# Patient Record
Sex: FEMALE | HISPANIC OR LATINO | Employment: UNEMPLOYED | URBAN - METROPOLITAN AREA
[De-identification: names, ages, dates, MRNs, and addresses within clinical notes are randomized per-mention and may not be internally consistent; named-entity substitution may affect disease eponyms.]

---

## 2017-01-01 ENCOUNTER — HOSPITAL ENCOUNTER (EMERGENCY)
Facility: MEDICAL CENTER | Age: 37
End: 2017-01-01
Attending: EMERGENCY MEDICINE

## 2017-01-01 ENCOUNTER — APPOINTMENT (OUTPATIENT)
Dept: RADIOLOGY | Facility: MEDICAL CENTER | Age: 37
End: 2017-01-01
Attending: EMERGENCY MEDICINE

## 2017-01-01 VITALS
HEIGHT: 66 IN | WEIGHT: 166.67 LBS | HEART RATE: 81 BPM | DIASTOLIC BLOOD PRESSURE: 62 MMHG | OXYGEN SATURATION: 100 % | BODY MASS INDEX: 26.79 KG/M2 | SYSTOLIC BLOOD PRESSURE: 129 MMHG | RESPIRATION RATE: 16 BRPM | TEMPERATURE: 97.2 F

## 2017-01-01 DIAGNOSIS — R30.0 DYSURIA: ICD-10-CM

## 2017-01-01 DIAGNOSIS — Z3A.12 12 WEEKS GESTATION OF PREGNANCY: ICD-10-CM

## 2017-01-01 LAB
ALBUMIN SERPL BCP-MCNC: 4.1 G/DL (ref 3.2–4.9)
ALBUMIN/GLOB SERPL: 1.3 G/DL
ALP SERPL-CCNC: 77 U/L (ref 30–99)
ALT SERPL-CCNC: 26 U/L (ref 2–50)
ANION GAP SERPL CALC-SCNC: 13 MMOL/L (ref 0–11.9)
APPEARANCE UR: CLEAR
AST SERPL-CCNC: 30 U/L (ref 12–45)
B-HCG SERPL-ACNC: ABNORMAL MIU/ML (ref 0–5)
BACTERIA #/AREA URNS HPF: ABNORMAL /HPF
BASOPHILS # BLD AUTO: 0.2 % (ref 0–1.8)
BASOPHILS # BLD: 0.03 K/UL (ref 0–0.12)
BILIRUB SERPL-MCNC: 1.1 MG/DL (ref 0.1–1.5)
BILIRUB UR QL STRIP.AUTO: NEGATIVE
BUN SERPL-MCNC: 5 MG/DL (ref 8–22)
CALCIUM SERPL-MCNC: 9.9 MG/DL (ref 8.5–10.5)
CHLORIDE SERPL-SCNC: 103 MMOL/L (ref 96–112)
CO2 SERPL-SCNC: 17 MMOL/L (ref 20–33)
COLOR UR: YELLOW
CREAT SERPL-MCNC: 0.45 MG/DL (ref 0.5–1.4)
CULTURE IF INDICATED INDCX: NO UA CULTURE
EOSINOPHIL # BLD AUTO: 0.01 K/UL (ref 0–0.51)
EOSINOPHIL NFR BLD: 0.1 % (ref 0–6.9)
EPI CELLS #/AREA URNS HPF: ABNORMAL /HPF
ERYTHROCYTE [DISTWIDTH] IN BLOOD BY AUTOMATED COUNT: 40.6 FL (ref 35.9–50)
GFR SERPL CREATININE-BSD FRML MDRD: >60 ML/MIN/1.73 M 2
GLOBULIN SER CALC-MCNC: 3.2 G/DL (ref 1.9–3.5)
GLUCOSE SERPL-MCNC: 90 MG/DL (ref 65–99)
GLUCOSE UR STRIP.AUTO-MCNC: NEGATIVE MG/DL
HCT VFR BLD AUTO: 37.8 % (ref 37–47)
HGB BLD-MCNC: 12.8 G/DL (ref 12–16)
IMM GRANULOCYTES # BLD AUTO: 0.06 K/UL (ref 0–0.11)
IMM GRANULOCYTES NFR BLD AUTO: 0.4 % (ref 0–0.9)
KETONES UR STRIP.AUTO-MCNC: 60 MG/DL
LEUKOCYTE ESTERASE UR QL STRIP.AUTO: NEGATIVE
LIPASE SERPL-CCNC: 12 U/L (ref 11–82)
LYMPHOCYTES # BLD AUTO: 2.99 K/UL (ref 1–4.8)
LYMPHOCYTES NFR BLD: 21.4 % (ref 22–41)
MCH RBC QN AUTO: 30.3 PG (ref 27–33)
MCHC RBC AUTO-ENTMCNC: 33.9 G/DL (ref 33.6–35)
MCV RBC AUTO: 89.6 FL (ref 81.4–97.8)
MICRO URNS: ABNORMAL
MONOCYTES # BLD AUTO: 0.68 K/UL (ref 0–0.85)
MONOCYTES NFR BLD AUTO: 4.9 % (ref 0–13.4)
MUCOUS THREADS #/AREA URNS HPF: ABNORMAL /HPF
NEUTROPHILS # BLD AUTO: 10.2 K/UL (ref 2–7.15)
NEUTROPHILS NFR BLD: 73 % (ref 44–72)
NITRITE UR QL STRIP.AUTO: NEGATIVE
NRBC # BLD AUTO: 0 K/UL
NRBC BLD AUTO-RTO: 0 /100 WBC
PH UR STRIP.AUTO: 6 [PH]
PLATELET # BLD AUTO: 247 K/UL (ref 164–446)
PMV BLD AUTO: 10.3 FL (ref 9–12.9)
POTASSIUM SERPL-SCNC: 3 MMOL/L (ref 3.6–5.5)
PROT SERPL-MCNC: 7.3 G/DL (ref 6–8.2)
PROT UR QL STRIP: NEGATIVE MG/DL
RBC # BLD AUTO: 4.22 M/UL (ref 4.2–5.4)
RBC # URNS HPF: ABNORMAL /HPF
RBC UR QL AUTO: ABNORMAL
SODIUM SERPL-SCNC: 133 MMOL/L (ref 135–145)
SP GR UR STRIP.AUTO: 1.02
WBC # BLD AUTO: 14 K/UL (ref 4.8–10.8)
WBC #/AREA URNS HPF: ABNORMAL /HPF

## 2017-01-01 PROCEDURE — A9270 NON-COVERED ITEM OR SERVICE: HCPCS | Performed by: EMERGENCY MEDICINE

## 2017-01-01 PROCEDURE — 85025 COMPLETE CBC W/AUTO DIFF WBC: CPT

## 2017-01-01 PROCEDURE — 80053 COMPREHEN METABOLIC PANEL: CPT

## 2017-01-01 PROCEDURE — 96361 HYDRATE IV INFUSION ADD-ON: CPT

## 2017-01-01 PROCEDURE — 83690 ASSAY OF LIPASE: CPT

## 2017-01-01 PROCEDURE — 700105 HCHG RX REV CODE 258: Performed by: EMERGENCY MEDICINE

## 2017-01-01 PROCEDURE — 84702 CHORIONIC GONADOTROPIN TEST: CPT

## 2017-01-01 PROCEDURE — 76817 TRANSVAGINAL US OBSTETRIC: CPT

## 2017-01-01 PROCEDURE — 81001 URINALYSIS AUTO W/SCOPE: CPT

## 2017-01-01 PROCEDURE — 36415 COLL VENOUS BLD VENIPUNCTURE: CPT

## 2017-01-01 PROCEDURE — 96375 TX/PRO/DX INJ NEW DRUG ADDON: CPT

## 2017-01-01 PROCEDURE — 700111 HCHG RX REV CODE 636 W/ 250 OVERRIDE (IP): Performed by: EMERGENCY MEDICINE

## 2017-01-01 PROCEDURE — 99285 EMERGENCY DEPT VISIT HI MDM: CPT

## 2017-01-01 PROCEDURE — 96365 THER/PROPH/DIAG IV INF INIT: CPT

## 2017-01-01 PROCEDURE — 700102 HCHG RX REV CODE 250 W/ 637 OVERRIDE(OP): Performed by: EMERGENCY MEDICINE

## 2017-01-01 RX ORDER — SODIUM CHLORIDE 9 MG/ML
1000 INJECTION, SOLUTION INTRAVENOUS ONCE
Status: COMPLETED | OUTPATIENT
Start: 2017-01-01 | End: 2017-01-01

## 2017-01-01 RX ORDER — ACETAMINOPHEN 325 MG/1
650 TABLET ORAL ONCE
Status: COMPLETED | OUTPATIENT
Start: 2017-01-01 | End: 2017-01-01

## 2017-01-01 RX ORDER — SODIUM CHLORIDE 9 MG/ML
1000 INJECTION, SOLUTION INTRAVENOUS CONTINUOUS
Status: DISCONTINUED | OUTPATIENT
Start: 2017-01-01 | End: 2017-01-01 | Stop reason: HOSPADM

## 2017-01-01 RX ORDER — CEFTRIAXONE 1 G/1
1 INJECTION, POWDER, FOR SOLUTION INTRAMUSCULAR; INTRAVENOUS ONCE
Status: COMPLETED | OUTPATIENT
Start: 2017-01-01 | End: 2017-01-01

## 2017-01-01 RX ORDER — CEPHALEXIN 500 MG/1
500 CAPSULE ORAL 3 TIMES DAILY
Qty: 15 CAP | Refills: 0 | Status: SHIPPED | OUTPATIENT
Start: 2017-01-01 | End: 2017-01-06

## 2017-01-01 RX ORDER — ONDANSETRON 2 MG/ML
4 INJECTION INTRAMUSCULAR; INTRAVENOUS ONCE
Status: COMPLETED | OUTPATIENT
Start: 2017-01-01 | End: 2017-01-01

## 2017-01-01 RX ADMIN — SODIUM CHLORIDE 1000 ML: 9 INJECTION, SOLUTION INTRAVENOUS at 20:30

## 2017-01-01 RX ADMIN — SODIUM CHLORIDE 1000 ML: 9 INJECTION, SOLUTION INTRAVENOUS at 18:11

## 2017-01-01 RX ADMIN — ONDANSETRON 4 MG: 2 INJECTION, SOLUTION INTRAMUSCULAR; INTRAVENOUS at 18:12

## 2017-01-01 RX ADMIN — CEFTRIAXONE SODIUM 1 G: 1 INJECTION, POWDER, FOR SOLUTION INTRAMUSCULAR; INTRAVENOUS at 20:30

## 2017-01-01 RX ADMIN — ACETAMINOPHEN 650 MG: 325 TABLET, FILM COATED ORAL at 18:12

## 2017-01-01 ASSESSMENT — PAIN SCALES - GENERAL
PAINLEVEL_OUTOF10: 3
PAINLEVEL_OUTOF10: 8

## 2017-01-01 NOTE — ED AVS SNAPSHOT
[employer] Access Code: [email or web address]  Expires: 1/31/2017  9:00 PM    [employer]  A secure, online tool to manage your health information     [employer]® is a secure, online tool that connects you to your personalized health information from the privacy of your home -- day or night - making it very easy for you to manage your healthcare. Once the activation process is completed, you can even access your medical information using the [employer] ren, which is available for free in the Apple Ren store or Google Play store.     [employer] provides the following levels of access (as shown below):   My Chart Features   [facility] Primary Care Doctor [facility]  Specialists [facility]  Urgent  Care Non-[facility]  Primary Care  Doctor   Email your healthcare team securely and privately 24/7 X X X X   Manage appointments: schedule your next appointment; view details of past/upcoming appointments X      Request prescription refills. X      View recent personal medical records, including lab and immunizations X X X X   View health record, including health history, allergies, medications X X X X   Read reports about your outpatient visits, procedures, consult and ER notes X X X X   See your discharge summary, which is a recap of your hospital and/or ER visit that includes your diagnosis, lab results, and care plan. X X       How to register for [employer]:  1. Go to  [email or web address].  2. Click on the Sign Up Now box, which takes you to the New Member Sign Up page. You will need to provide the following information:  a. Enter your [employer] Access Code exactly as it appears at the top of this page. (You will not need to use this code after you’ve completed the sign-up process. If you do not sign up before the expiration date, you must request a new code.)   b. Enter your date of birth.   c. Enter your home email address.   d. Click Submit, and follow the next screen’s instructions.  3. Create a [employer] ID. This will be your [employer]  login ID and cannot be changed, so think of one that is secure and easy to remember.  4. Create a [employer] password. You can change your password at any time.  5. Enter your Password Reset Question and Answer. This can be used at a later time if you forget your password.   6. Enter your e-mail address. This allows you to receive e-mail notifications when new information is available in [employer].  7. Click Sign Up. You can now view your health information.    For assistance activating your [employer] account, call [phone]

## 2017-01-01 NOTE — ED AVS SNAPSHOT
1/1/2017          Jaylene Deleon  [address]  [place] [address]    Dear Jaylene:    [facility] wants to ensure your discharge home is safe and you or your loved ones have had all your questions answered regarding your care after you leave the hospital.    You may receive a telephone call within two days of your discharge.  This call is to make certain you understand your discharge instructions as well as ensure we provided you with the best care possible during your stay with us.     The call will only last approximately 3-5 minutes and will be done by a nurse.    Once again, we want to ensure your discharge home is safe and that you have a clear understanding of any next steps in your care.  If you have any questions or concerns, please do not hesitate to contact us, we are here for you.  Thank you for choosing [facility] for your healthcare needs.    Sincerely,    David Monsivais    [facility]

## 2017-01-01 NOTE — ED AVS SNAPSHOT
After Visit Summary                                                                                                                Jaylene Deleon   MRN: [id number]    Department:  [facility], Emergency Dept   Date of Visit:  1/1/2017            [facility], Emergency Dept    [address] [place] [id number]    Phone:  [phone]      You were seen by     Cj Harley M.D.      Your Diagnosis Was     Dysuria     R30.0       These are the medications you received during your hospitalization from 01/01/2017 1533 to 01/01/2017 2100     Date/Time Order Dose Route Action    01/01/2017 1811 NS infusion 1,000 mL 1,000 mL Intravenous New Bag    01/01/2017 1812 ondansetron (ZOFRAN) syringe/vial injection 4 mg 4 mg Intravenous Given    01/01/2017 1812 acetaminophen (TYLENOL) tablet 650 mg 650 mg Oral Given    01/01/2017 2030 NS infusion 1,000 mL 1,000 mL Intravenous New Bag    01/01/2017 2030 cefTRIAXone (ROCEPHIN) injection 1 g 1 g Intravenous Given      Follow-up Information     1. Follow up with [facility], Emergency Dept.    Specialty:  Emergency Medicine    Why:  If symptoms worsen    Contact information    [address] [place] [id number] [phone]      Medication Information     Review all of your home medications and newly ordered medications with your primary doctor and/or pharmacist as soon as possible. Follow medication instructions as directed by your doctor and/or pharmacist.     Please keep your complete medication list with you and share with your physician. Update the information when medications are discontinued, doses are changed, or new medications (including over-the-counter products) are added; and carry medication information at all times in the event of emergency situations.               Medication List      START taking these medications        Instructions    cephALEXin 500 MG Caps   Commonly known as:   KEFLEX    Take 1 Cap by mouth 3 times a day for 5 days.   Dose:  500 mg               Procedures and tests performed during your visit     CBC WITH DIFFERENTIAL    COMP METABOLIC PANEL    ESTIMATED GFR    HCG QUANTITATIVE SERUM    IV Saline Lock    LIPASE    URINALYSIS CULTURE, IF INDICATED    URINE MICROSCOPIC (W/UA)    US-OB PELVIS TRANSVAGINAL        Discharge Instructions       Disuria  (Dysuria)  La disuria es dolor o molestia al orinar. El dolor o la molestia se pueden sentir en el conducto que transporta la orina fuera de la vejiga (uretra) o en el tejido que rodea los genitales. El dolor también se puede sentir en la mika de la jasen y en la parte inferior del abdomen y de la espalda. Quizás tenga que orinar con frecuencia o la sensación repentina de tener que orinar (tenesmo vesical). La disuria puede afectar tanto a hombres kaleigh a mujeres, narendra es más común en las mujeres.  La causa puede deberse a muchos problemas diferentes:  · Infección en las vías urinarias en mujeres.  · Infección en los riñones o la vejiga.  · Cálculos en los riñones o la vejiga.  · Ciertas enfermedades de transmisión sexual (ETS), kaleigh la clamidia.  · Deshidratación.  · Inflamación de la vagina.  · Uso de ciertos medicamentos.  · Uso de ciertos jabones o productos perfumados que provocan irritación.  INSTRUCCIONES PARA EL CUIDADO EN EL HOGAR  Controle roberts disuria para staci si hay cambios. Las siguientes indicaciones pueden ayudar a aliviar cualquier molestia que pueda sentir:  · Destinee suficiente líquido para mantener la orina danny o de color amarillo pálido.  · Vacíe la vejiga con frecuencia. Evite retener la orina sahnnan largos períodos.  · Después de defecar, las mujeres deben limpiarse desde adelante hacia atrás, usando el papel higiénico solo maryam vez.  · Vacíe la vejiga después de tener relaciones sexuales.  · [place] los medicamentos solamente kaleigh se lo haya indicado el médico.  · Si le recetaron antibióticos, asegúrese de  terminarlos, incluso si comienza a sentirse mejor.  · Evite la cafeína, el té y el alcohol. Estos productos pueden irritar la vejiga y empeorar la disuria. En los hombres, el alcohol puede irritar la próstata.  · Concurra a todas las visitas de control kaleigh se lo haya indicado el médico. [place] es importante.  · Si le realizaron pruebas para detectar la causa de la disuria, es roberts responsabilidad retirar los resultados. Consulte en el laboratorio o en el departamento en el que fue realizado el estudio cuándo y cómo podrá obtener los resultados. Hable con el médico si tiene alguna pregunta sobre los resultados.  SOLICITE ATENCIÓN MÉDICA SI:  · Siente dolor en la espalda o a los costados del cuerpo.  · Tiene fiebre.  · Tiene náuseas o vómitos.  · Observa pia en la orina.  · Está orinando con más frecuencia que lo habitual.  SOLICITE ATENCIÓN MÉDICA DE INMEDIATO SI:  · El dolor es intenso y no se cheryl con los medicamentos.  · No puede retener líquido.  · Usted u otra persona advierten algún cambio en roberts función mental.  · Tiene maryam frecuencia cardíaca acelerada en reposo.  · Tiene temblores o escalofríos.  · Se siente muy débil.     Esta información no tiene kaleigh fin reemplazar el consejo del médico. Asegúrese de hacerle al médico cualquier pregunta que tenga.     Document Released: 01/06/2009 Document Revised: 01/08/2016  [employer] Interactive Patient Education ©2016 [employer]      Embarazo  (Pregnancy)  Si planea quedar embarazada, es maryam buena idea concertar maryam yamini de preconcepción con el médico para poder lograr un estilo de alex saludable ante de quedar embarazada. [place] incluye dieta, peso, ejercicio, el coleen vitaminas prenatales en especial ácido fólico (ayuda a prevenir defectos en el cerebro y la médula edouard), evitar el alcohol, fumar, las drogas ilegales, problemas médicos (diabetes, convulsiones), historial familiar de problemas genéticos, condiciones de trabajo e inmunizaciones. Es mejor tener  conocimiento de estas cosas y hacer algo antes de quedar embarazada.  Si está embarazada, es necesario que siga ciertas pautas para tener un bebé jessica. Es muy importante realizar controles prenatales adecuados y seguir las indicaciones del profesional que la asiste. La atención prenatal incluye toda la asistencia médica que usted recibe antes del nacimiento del bebé. [place] ayuda a prevenir problemas shannan el embarazo y el parto.  INSTRUCCIONES PARA EL CUIDADO DOMICILIARIO  · Comience las consultas prenatales alrededor de la 12ª semana de embarazo o lo antes posible. Al principio generalmente se programan cada mes. Se hacen más frecuentes en los 2 últimos meses antes del parto. Es importante que concurra a todas las citas con el profesional y siga nilam instrucciones con respecto a los medicamentos que deba utilizar, a la actividad física y a la dieta.  · Shannan el embarazo debe obtener nutrientes para usted y para roberts bebé. Consuma maryam dieta normal y harshad balanceada. Elija alimentos kaleigh carne, pescado, leche y otros productos lácteos, vegetales, frutas, panes integrales y cereales El profesional le informará cuál es el aumento de peso ideal, según roberts peso y altura actuales. Destinee gran cantidad de líquidos. Trate de beber 8 vasos de líquidos por día.  · El alcohol se asocia a cierto número de defectos del nacimiento, incluyendo el síndrome de alcoholismo fetal. Lo mejor es evitarlo completamente El cigarrillo causa nacimientos prematuros y bebés de bajo peso al nacer. El consumo de alcohol y nicotina shannan el embarazo también aumentan marcadamente la probabilidad de que el brenton sea químicamente dependiente en etapas posteriores de roberts alex y puede contribuir al síndrome de muerte súbita infantil (SMSI)  · No consuma drogas.  · Solo tome medicamentos prescriptos o de venta aris que le haya recomendado el profesional. Algunos medicamentos pueden causar problemas genéticos y físicos al bebé  · Las náuseas matinales  pueden aliviarse si come algunas galletitas saladas en la cama. Coma dos galletitas antes de levantarse por la mañana.  · Las relaciones sexuales pueden continuarse hasta demetrius el final del embarazo, si no se presentan otros problemas kaleigh pérdida prematura (antes de tiempo) de líquido amniótico, hemorragia vaginal, dolor shannan las relaciones sexuales o dolor abdominal (en el vientre).  · Practique ejercicios con regularidad. Consulte con el profesional que la asiste si no sabe con certeza si determinados ejercicios son seguros.  · No utilice la bañera con [place], true turcos y saunas. Estos aumentan el riesgo de sufrir un desmayo o de pérdida del conocimiento, y así lastimarse usted o el bebé. La natación es un buen ejercicio. Descanse todo lo que pueda e incluya maryam siesta después de almorzar siempre que le sea posible, especialmente shannan el tercer trimestre.  · Evite los olores y las sustancias químicas tóxicas.  · No use zapatos de tacones altos, podría perder el equilibrio y caer.  · No levante objetos de más de 2,5 kg. Si levanta un objeto, flexione las piernas y los muslos, y no la espalda.  · Evite los viajes largos, especialmente en el tercer trimestre.  · Si debe viajar fuera de la ciudad o de roberts estado, lleve maryam copia de la historia clínica.  SOLICITE ATENCIÓN MÉDICA DE INMEDIATO SI:  · La temperatura oral se eleva sin motivo por encima de 38,9° C (102° F) o según le indique el profesional que lo asiste.  · Tiene maryam pérdida de líquido por la vagina. Si sospecha maryam ruptura de las membranas, tómese la temperatura y llame al profesional para informarlo sobre esto.  · Observa unas pequeñas manchas o maryam hemorragia vaginal Notifique al profesional acerca de la cantidad y de cuántos apósitos está utilizando.  · Continúa teniendo náuseas y no obtiene alivio de los medicamentos que le banegas indicado, o vomita pia o maryam sustancia similar a la borra del café.  · Presenta un dolor en la mika superior  del abdomen.  · Siente molestias en el ligamento cecilio en la parte abdominal baja. El profesional que la asiste la evaluará.  · Siente pequeñas contracciones del útero (matriz)  · No siente que el bebé se mueve, o percibe menos movimientos que antes.  · Siente dolor al orinar.  · Observa maryam hemorragia vaginal anormal.  · Tiene diarrea persistente.  · Sufre maryam cefalea grave.  · Tiene problemas visuales.  · Comienza a sentir debilidad muscular.  · Se siente mareada o sufre un desmayo.  · Comienza a sentir falta de aire.  · Siente dolor en el pecho.  · Sufre dolor en la espalda que se irradia hacia la pierna y el pie.  · Siente latidos cardíacos irregulares o la frecuencia cardíaca es muy rápida.  · Aumenta excesivamente de peso en un período breve (2,5 kg en 3 a 5 días)  · Se ve envuelta en maryam situación de violencia doméstica.  Document Released: 09/27/2006 Document Revised: 03/11/2013  [employer]Care® Patient Information ©2014 [employer].            Patient Information     Patient Information    Following emergency treatment: all patient requiring follow-up care must return either to a private physician or a clinic if your condition worsens before you are able to obtain further medical attention, please return to the emergency room.     Billing Information    At [facility], we work to make the billing process streamlined for our patients.  Our Representatives are here to answer any questions you may have regarding your hospital bill.  If you have insurance coverage and have supplied your insurance information to us, we will submit a claim to your insurer on your behalf.  Should you have any questions regarding your bill, we can be reached online or by phone as follows:  Online: You are able pay your bills online or live chat with our representatives about any billing questions you may have. We are here to help Monday - Friday from 8:00am to 7:30pm and 9:00am - 12:00pm on Saturdays.  Please visit  [email or web address]  for more information.   Phone:  [phone] or [phone]    Please note that your emergency physician, surgeon, pathologist, radiologist, anesthesiologist, and other specialists are not employed by [facility] and will therefore bill separately for their services.  Please contact them directly for any questions concerning their bills at the numbers below:     Emergency Physician Services:  [phone]  [facility]:  [phone]  [facility]:  [phone]  [facility]:  [phone]    1. Your final bill may vary from the amount quoted upon discharge if all procedures are not complete at that time, or if your doctor has additional procedures of which we are not aware. You will receive an additional bill if you return to the Emergency Department at [facility] for suture removal regardless of the facility of which the sutures were placed.     2. Please arrange for settlement of this account at the emergency registration.    3. All self-pay accounts are due in full at the time of treatment.  If you are unable to meet this obligation then payment is expected within 4-5 days.     4. If you have had radiology studies (CT, X-ray, Ultrasound, MRI), you have received a preliminary result during your emergency department visit. Please contact the radiology department [phone] to receive a copy of your final result. Please discuss the Final result with your primary physician or with the follow up physician provided.     Crisis Hotline:  [place] Crisis Hotline:  7-384-RMXRYTH or 1-571.955.9306  [place] Crisis Hotline:    1-680.753.1036 or 170-773-1304         ED Discharge Follow Up Questions    1. In order to provide you with very good care, we would like to follow up with a phone call in the next few days.  May we have your permission to contact you?     YES /  NO    2. What is the best phone number to call  you? (       )_____-__________    3. What is the best time to call you?      Morning  /  Afternoon  /  Evening                   Patient Signature:  ____________________________________________________________    Date:  ____________________________________________________________      Your appointments     Jan 05, 2017  1:30 PM   New OB Exam with PC INTAKE, NEW OB   [facility]    [address] [place] [id number]   [phone]

## 2017-01-02 NOTE — ED NOTES
Verbalizes understanding of POC. PIV established, blood sent to lab and medicated per MAR. Ambulatory to restroom, clean catch urine sample sent to lab.

## 2017-01-02 NOTE — DISCHARGE INSTRUCTIONS
Disuria  (Dysuria)  La disuria es dolor o molestia al orinar. El dolor o la molestia se pueden sentir en el conducto que transporta la orina fuera de la vejiga (uretra) o en el tejido que rodea los genitales. El dolor también se puede sentir en la mika de la jasen y en la parte inferior del abdomen y de la espalda. Quizás tenga que orinar con frecuencia o la sensación repentina de tener que orinar (tenesmo vesical). La disuria puede afectar tanto a hombres kaleigh a mujeres, narendra es más común en las mujeres.  La causa puede deberse a muchos problemas diferentes:  · Infección en las vías urinarias en mujeres.  · Infección en los riñones o la vejiga.  · Cálculos en los riñones o la vejiga.  · Ciertas enfermedades de transmisión sexual (ETS), kaleigh la clamidia.  · Deshidratación.  · Inflamación de la vagina.  · Uso de ciertos medicamentos.  · Uso de ciertos jabones o productos perfumados que provocan irritación.  INSTRUCCIONES PARA EL CUIDADO EN EL HOGAR  Controle roberts disuria para staci si hay cambios. Las siguientes indicaciones pueden ayudar a aliviar cualquier molestia que pueda sentir:  · Destinee suficiente líquido para mantener la orina danny o de color amarillo pálido.  · Vacíe la vejiga con frecuencia. Evite retener la orina shannan largos períodos.  · Después de defecar, las mujeres deben limpiarse desde adelante hacia atrás, usando el papel higiénico solo maryam vez.  · Vacíe la vejiga después de tener relaciones sexuales.  · Peterman los medicamentos solamente kaleigh se lo haya indicado el médico.  · Si le recetaron antibióticos, asegúrese de terminarlos, incluso si comienza a sentirse mejor.  · Evite la cafeína, el té y el alcohol. Estos productos pueden irritar la vejiga y empeorar la disuria. En los hombres, el alcohol puede irritar la próstata.  · Concurra a todas las visitas de control kaleigh se lo haya indicado el médico. [employer] es importante.  · Si le realizaron pruebas para detectar la causa de la disuria, es roberts  responsabilidad retirar los resultados. Consulte en el laboratorio o en el departamento en el que fue realizado el estudio cuándo y cómo podrá obtener los resultados. Hable con el médico si tiene alguna pregunta sobre los resultados.  SOLICITE ATENCIÓN MÉDICA SI:  · Siente dolor en la espalda o a los costados del cuerpo.  · Tiene fiebre.  · Tiene náuseas o vómitos.  · Observa pia en la orina.  · Está orinando con más frecuencia que lo habitual.  SOLICITE ATENCIÓN MÉDICA DE INMEDIATO SI:  · El dolor es intenso y no se cheryl con los medicamentos.  · No puede retener líquido.  · Usted u otra persona advierten algún cambio en roberts función mental.  · Tiene maryam frecuencia cardíaca acelerada en reposo.  · Tiene temblores o escalofríos.  · Se siente muy débil.     Esta información no tiene kaleigh fin reemplazar el consejo del médico. Asegúrese de hacerle al médico cualquier pregunta que tenga.     Document Released: 01/06/2009 Document Revised: 01/08/2016  [employer] Interactive Patient Education ©2016 [employer].      Embarazo  (Pregnancy)  Si planea quedar embarazada, es maryam buena idea concertar maryam yamini de preconcepción con el médico para poder lograr un estilo de alex saludable ante de quedar embarazada. [place] incluye dieta, peso, ejercicio, el coleen vitaminas prenatales en especial ácido fólico (ayuda a prevenir defectos en el cerebro y la médula edouard), evitar el alcohol, fumar, las drogas ilegales, problemas médicos (diabetes, convulsiones), historial familiar de problemas genéticos, condiciones de trabajo e inmunizaciones. Es mejor tener conocimiento de estas cosas y hacer algo antes de quedar embarazada.  Si está embarazada, es necesario que siga ciertas pautas para tener un bebé jessica. Es muy importante realizar controles prenatales adecuados y seguir las indicaciones del profesional que la asiste. La atención prenatal incluye toda la asistencia médica que usted recibe antes del nacimiento del bebé. [place] ayuda a  prevenir problemas shannan el embarazo y el parto.  INSTRUCCIONES PARA EL CUIDADO DOMICILIARIO  · Comience las consultas prenatales alrededor de la 12ª semana de embarazo o lo antes posible. Al principio generalmente se programan cada mes. Se hacen más frecuentes en los 2 últimos meses antes del parto. Es importante que concurra a todas las citas con el profesional y siga nilam instrucciones con respecto a los medicamentos que deba utilizar, a la actividad física y a la dieta.  · Shannan el embarazo debe obtener nutrientes para usted y para roberts bebé. Consuma maryam dieta normal y harshad balanceada. Elija alimentos kaleigh carne, pescado, leche y otros productos lácteos, vegetales, frutas, panes integrales y cereales El profesional le informará cuál es el aumento de peso ideal, según roberts peso y altura actuales. Destinee gran cantidad de líquidos. Trate de beber 8 vasos de líquidos por día.  · El alcohol se asocia a cierto número de defectos del nacimiento, incluyendo el síndrome de alcoholismo fetal. Lo mejor es evitarlo completamente El cigarrillo causa nacimientos prematuros y bebés de bajo peso al nacer. El consumo de alcohol y nicotina shannan el embarazo también aumentan marcadamente la probabilidad de que el brenton sea químicamente dependiente en [place]apas posteriores de roberts alex y puede contribuir al síndrome de muerte súbita infantil (SMSI)  · No consuma drogas.  · Solo tome medicamentos prescriptos o de venta aris que le haya recomendado el profesional. Algunos medicamentos pueden causar problemas genéticos y físicos al bebé  · Las náuseas matinales pueden aliviarse si come algunas galletitas saladas en la cama. Coma dos galletitas antes de levantarse por la mañana.  · Las relaciones sexuales pueden continuarse hasta demetrius el final del embarazo, si no se presentan otros problemas kaleigh pérdida prematura (antes de tiempo) de líquido amniótico, hemorragia vaginal, dolor shannan las relaciones sexuales o dolor abdominal (en el  vientre).  · Practique ejercicios con regularidad. Consulte con el profesional que la asiste si no sabe con certeza si determinados ejercicios son seguros.  · No utilice la bañera con [place], true turcos y saunas. Estos aumentan el riesgo de sufrir un desmayo o de pérdida del conocimiento, y así lastimarse usted o el bebé. La natación es un buen ejercicio. Descanse todo lo que pueda e incluya maryam siesta después de almorzar siempre que le sea posible, especialmente shannan el tercer trimestre.  · Evite los olores y las sustancias químicas tóxicas.  · No use zapatos de tacones altos, podría perder el equilibrio y caer.  · No levante objetos de más de 2,5 kg. Si levanta un objeto, flexione las piernas y los muslos, y no la espalda.  · Evite los viajes largos, especialmente en el tercer trimestre.  · Si debe viajar fuera de la ciudad o de roberts estado, lleve maryam copia de la historia clínica.  SOLICITE ATENCIÓN MÉDICA DE INMEDIATO SI:  · La temperatura oral se eleva sin motivo por encima de 38,9° C (102° F) o según le indique el profesional que lo asiste.  · Tiene maryam pérdida de líquido por la vagina. Si sospecha maryam ruptura de las membranas, tómese la temperatura y llame al profesional para informarlo sobre esto.  · Observa unas pequeñas manchas o maryam hemorragia vaginal Notifique al profesional acerca de la cantidad y de cuántos apósitos está utilizando.  · Continúa teniendo náuseas y no obtiene alivio de los medicamentos que le banegas indicado, o vomita pia o maryam sustancia similar a la borra del café.  · Presenta un dolor en la mika superior del abdomen.  · Siente molestias en el ligamento cecilio en la parte abdominal baja. El profesional que la asiste la evaluará.  · Siente pequeñas contracciones del útero (matriz)  · No siente que el bebé se mueve, o percibe menos movimientos que antes.  · Siente dolor al orinar.  · Observa maryam hemorragia vaginal anormal.  · Tiene diarrea persistente.  · Sufre maryam cefalea  grave.  · Tiene problemas visuales.  · Comienza a sentir debilidad muscular.  · Se siente mareada o sufre un desmayo.  · Comienza a sentir falta de aire.  · Siente dolor en el pecho.  · Sufre dolor en la espalda que se irradia hacia la pierna y el pie.  · Siente latidos cardíacos irregulares o la frecuencia cardíaca es muy rápida.  · Aumenta excesivamente de peso en un período breve (2,5 kg en 3 a 5 días)  · Se ve envuelta en maryam situación de violencia doméstica.  Document Released: 09/27/2006 Document Revised: 03/11/2013  [email or web address]® Patient Information ©2014 [email or web address], [employer].

## 2017-01-02 NOTE — ED PROVIDER NOTES
ER Provider Note     Scribed for Sandhya Harley, * by Tennille Gray. 1/1/2017, 6:10 PM.    Primary Care Provider: Pcp Pt States None  Means of Arrival: Walk in   History obtained from: Patient  History limited by: none     CHIEF COMPLAINT   Chief Complaint   Patient presents with   • Pregnancy     3 months   • Abdominal Pain     left sided, starts low and radiates up into her upper quadrant. States that she has had this pain x 12 weeks, has been seen befor for same       HPI   Jaylene Deleon is a 36 y.o. who presents to the emergency department for for abdominal pain onset 1-2 days. She tells me her pain mostly localized to her left lower quadrant and does not radiate. She describes her pain as dull and constant since onset. She has had mild abdominal pain for a few weeks but her pain today is much worse. The patient reports associated burning sensation with urination, nausea that has worsened recently, and vomiting. She denies vaginal bleeding. She has a history of UTI. She is 3 months pregnant and has 3 other daughters. The patient reports a history of a cholecystectomy. She denies appendectomy. Denies any allergies.       REVIEW OF SYSTEMS     General: No fever or chills..  GI: Abdominal pain nausea and vomiting.  : Dysuria. No vaginal bleeding  All other systems reviewed and are negative    PAST MEDICAL HISTORY  Past Medical History   Diagnosis Date   • Umbilical hernia 12/2009       SURGICAL HISTORY  Past Surgical History   Procedure Laterality Date   • Karen by laparoscopy  10/16/2011     Performed by SANDHYA CAMARENA at SURGERY [facility] ORS   • Umbilical hernia repair  10/16/2011     Performed by SANDHYA CAMARENA at SURGERY [facility]       SOCIAL HISTORY  Social History   Substance Use Topics   • Smoking status: Never Smoker    • Smokeless tobacco: Not on file   • Alcohol Use: No       CURRENT MEDICATIONS  Previous Medications    No medications on file       ALLERGIES  "  Allergies   Allergen Reactions   • Nkda [No Known Drug Allergy]        PHYSICAL EXAM   Vital Signs: /62 mmHg  Pulse 92  Temp(Src) 36.2 °C (97.2 °F) (Temporal)  Resp 16  Ht 1.676 m (5' 5.98\")  Wt 75.6 kg (166 lb 10.7 oz)  BMI 26.91 kg/m2  SpO2 97%    Constitutional: Well developed, Well nourished, No acute distress, Non-toxic appearance.   Psychiatric: Calm. Not anxious.  HENT:  Oropharynx: no exudate no erythema  Eyes: PERRLA, EOMI, Conjunctiva normal, No discharge.   Musculoskeletal: Neck is soft and supple no meningismus  Lymphatic: No cervical lymphadenopathy noted.   Cardiovascular: Normal heart rate, Normal rhythm, No murmurs, Negative Homans, no pedal edema, good equal pedal pulses.  Pulmonary: Lungs are clear to auscultation bilaterally. No wheezes rales or rhonchi  Abdomen: Gravid. Bowel sounds normal, soft, nontender. No rebound and no guarding.  Skin: Warm, Dry, No erythema, No rash.   : No CVA tenderness.   Neurologic: Alert & oriented, moves all extremities normally. Good sensation to light touch on all extremities.    DIAGNOSTIC STUDIES/PROCEDURES    Labs:   Results for orders placed or performed during the hospital encounter of 01/01/17   CBC WITH DIFFERENTIAL   Result Value Ref Range    WBC 14.0 (H) 4.8 - 10.8 K/uL    RBC 4.22 4.20 - 5.40 M/uL    Hemoglobin 12.8 12.0 - 16.0 g/dL    Hematocrit 37.8 37.0 - 47.0 %    MCV 89.6 81.4 - 97.8 fL    MCH 30.3 27.0 - 33.0 pg    MCHC 33.9 33.6 - 35.0 g/dL    RDW 40.6 35.9 - 50.0 fL    Platelet Count 247 164 - 446 K/uL    MPV 10.3 9.0 - 12.9 fL    Neutrophils-Polys 73.00 (H) 44.00 - 72.00 %    Lymphocytes 21.40 (L) 22.00 - 41.00 %    Monocytes 4.90 0.00 - 13.40 %    Eosinophils 0.10 0.00 - 6.90 %    Basophils 0.20 0.00 - 1.80 %    Immature Granulocytes 0.40 0.00 - 0.90 %    Nucleated RBC 0.00 /100 WBC    Neutrophils (Absolute) 10.20 (H) 2.00 - 7.15 K/uL    Lymphs (Absolute) 2.99 1.00 - 4.80 K/uL    Monos (Absolute) 0.68 0.00 - 0.85 K/uL    Eos " (Absolute) 0.01 0.00 - 0.51 K/uL    Baso (Absolute) 0.03 0.00 - 0.12 K/uL    Immature Granulocytes (abs) 0.06 0.00 - 0.11 K/uL    NRBC (Absolute) 0.00 K/uL   COMP METABOLIC PANEL   Result Value Ref Range    Sodium 133 (L) 135 - 145 mmol/L    Potassium 3.0 (L) 3.6 - 5.5 mmol/L    Chloride 103 96 - 112 mmol/L    Co2 17 (L) 20 - 33 mmol/L    Anion Gap 13.0 (H) 0.0 - 11.9    Glucose 90 65 - 99 mg/dL    Bun 5 (L) 8 - 22 mg/dL    Creatinine 0.45 (L) 0.50 - 1.40 mg/dL    Calcium 9.9 8.5 - 10.5 mg/dL    AST(SGOT) 30 12 - 45 U/L    ALT(SGPT) 26 2 - 50 U/L    Alkaline Phosphatase 77 30 - 99 U/L    Total Bilirubin 1.1 0.1 - 1.5 mg/dL    Albumin 4.1 3.2 - 4.9 g/dL    Total Protein 7.3 6.0 - 8.2 g/dL    Globulin 3.2 1.9 - 3.5 g/dL    A-G Ratio 1.3 g/dL   LIPASE   Result Value Ref Range    Lipase 12 11 - 82 U/L   URINALYSIS CULTURE, IF INDICATED   Result Value Ref Range    Micro Urine Req Microscopic     Color Yellow     Character Clear     Specific Gravity 1.018 <1.035    Ph 6.0 5.0-8.0    Glucose Negative Negative mg/dL    Ketones 60 (A) Negative mg/dL    Protein Negative Negative mg/dL    Bilirubin Negative Negative    Nitrite Negative Negative    Leukocyte Esterase Negative Negative    Occult Blood Trace (A) Negative    Culture Indicated No UA Culture   HCG QUANTITATIVE SERUM   Result Value Ref Range    Bhcg 61061.6 (H) 0.0 - 5.0 mIU/mL   URINE MICROSCOPIC (W/UA)   Result Value Ref Range    WBC 2-5 /hpf    RBC 0-2 /hpf    Bacteria Few (A) None /hpf    Epithelial Cells Few /hpf    Mucous Threads Few /hpf   ESTIMATED GFR   Result Value Ref Range    GFR If African American >60 >60 mL/min/1.73 m 2    GFR If Non African American >60 >60 mL/min/1.73 m 2       All labs reviewed by me.    Radiology:   US-OB PELVIS TRANSVAGINAL   Final Result      1.  There is a single live IUP estimated at 12 weeks 6 days gestational age with an SANDRA of 7/10/2017 based on limited abdominal circumference and femur length measurements.   2.  There is no  evidence of acute hemorrhage.        The radiologist's interpretation of all radiological studies have been reviewed by me.    COURSE & MEDICAL DECISION MAKING   Nursing notes, VS, PMSFSHx reviewed in chart     Differential Diagnoses: (include but are not limited to) abruptio placentae, threatened miscarriage, UTI, Pyelonephritis    6:10 PM - Patient was evaluated; pelvic ultrasound, CBC, CMP, lipase, UA, and HCG quant ordered. The patient will be medicated with 1 L NS infusion, 4 mg zofran injection and 650 mg Tylenol tablet for her symptoms.     Patient is here with the above symptoms. Patient at this point has very classic symptoms of UTI with dysuria. Patient is pregnant. I am concerned about a urinary tract infection. Urine is suggestive of UTI but, not very convincing however, because she is pregnant and she has bacteria in her urine and she has symptoms we should treat. Ultrasound did not reveal any pregnancy issues at this time. In addition, her repeat exam she was nontender soft no rebound no guarding. Therefore, diverticulitis, appendicitis is less likely.    However in front of her  I did discuss that if she is not better tomorrow I want her back for repeat evaluation. I have told him that just because the urine is suspicious we may not have a definitive diagnosis quite yet until the cultures are back. Please note that during the history physical and discharge instructions family was offered the  system but the  declined. He felt comfortable translating for the wife.    I think the patient be safely discharged home please note that there is some mild hypokalemia potassium of 63.0 some ketones in the urine which does suggest dehydration and perhaps this could also be causing some pelvic pain. She was encouraged to eat potassium rich foods.    FINAL IMPRESSION   1. Dysuria    2. 12 weeks gestation of pregnancy         I, Tennille Gray (Scribe), am scribing for, and in the presence  of, Cj Harley, *.    Electronically signed by: Tennille Gray (Scribe), 1/1/2017    I, Cj Harley, * personally performed the services described in this documentation, as scribed by Tennille Gray in my presence, and it is both accurate and complete.    The note accurately reflects work and decisions made by me.  Cj Harley  1/1/2017  8:30 PM

## 2017-01-05 ENCOUNTER — INITIAL PRENATAL (OUTPATIENT)
Dept: OBGYN | Facility: CLINIC | Age: 37
End: 2017-01-05

## 2017-01-05 ENCOUNTER — HOSPITAL ENCOUNTER (OUTPATIENT)
Facility: MEDICAL CENTER | Age: 37
End: 2017-01-05
Attending: NURSE PRACTITIONER
Payer: COMMERCIAL

## 2017-01-05 VITALS
BODY MASS INDEX: 26.52 KG/M2 | HEIGHT: 66 IN | SYSTOLIC BLOOD PRESSURE: 112 MMHG | WEIGHT: 165 LBS | DIASTOLIC BLOOD PRESSURE: 48 MMHG

## 2017-01-05 DIAGNOSIS — Z34.82 ENCOUNTER FOR SUPERVISION OF OTHER NORMAL PREGNANCY, SECOND TRIMESTER: Primary | ICD-10-CM

## 2017-01-05 DIAGNOSIS — Z34.82 ENCOUNTER FOR SUPERVISION OF OTHER NORMAL PREGNANCY, SECOND TRIMESTER: ICD-10-CM

## 2017-01-05 DIAGNOSIS — O09.522 ELDERLY MULTIGRAVIDA IN SECOND TRIMESTER: ICD-10-CM

## 2017-01-05 LAB
APPEARANCE UR: NORMAL
BILIRUB UR STRIP-MCNC: NORMAL MG/DL
COLOR UR AUTO: NORMAL
GLUCOSE UR STRIP.AUTO-MCNC: NEGATIVE MG/DL
KETONES UR STRIP.AUTO-MCNC: NEGATIVE MG/DL
LEUKOCYTE ESTERASE UR QL STRIP.AUTO: NEGATIVE
NITRITE UR QL STRIP.AUTO: NEGATIVE
PH UR STRIP.AUTO: 6 [PH] (ref 5–8)
PROT UR QL STRIP: NORMAL MG/DL
RBC UR QL AUTO: NORMAL
SP GR UR STRIP.AUTO: 1.03
UROBILINOGEN UR STRIP-MCNC: NORMAL MG/DL

## 2017-01-05 PROCEDURE — 81002 URINALYSIS NONAUTO W/O SCOPE: CPT | Performed by: NURSE PRACTITIONER

## 2017-01-05 PROCEDURE — 8199 PREG CTR HIGH RISK PKG RATE (SYSTEM): Performed by: NURSE PRACTITIONER

## 2017-01-05 PROCEDURE — 59402 PR NEW OB HIGH RISK: CPT | Performed by: NURSE PRACTITIONER

## 2017-01-05 NOTE — PATIENT INSTRUCTIONS
P:  1.  GC/CT done. Pap done.         2.  Prenatal labs, including AFP ordered - lab slip given        3.  Discussed PNV, diet, and adequate water intake        4.  NOB packet given        5.  Return to office in 4 wks        6.  Complete OB US in 7 wks

## 2017-01-05 NOTE — PROGRESS NOTES
Pt here today for NOB visit  LMP: Unknown  WT: 165 lb  BP: 112/48  Pt states was seen at [facility] on 01/01/17 due to lower left abdominal pain.   Preferred pharmacy verified with pt.  Pt states having some nausea and vomiting. States no other complaints.  Pb # [phone]

## 2017-01-05 NOTE — PROGRESS NOTES
"S:  Jaylene Deleon is a 36 y.o.   @ EGA: 13w3d SANDRA: Estimated Date of Delivery: 7/10/17  per  US who presents for her new OB exam.  She has no complaints.  Desires AFP.  Declines CF.  Reports faint FM.  Denies VB, LOF, or cramping.  Denies dysuria, vaginal DC.  Pt is  and lives with , Donis. Not presently working outside the home.  Pregnancy is unplanned but wanted.      O:    Filed Vitals:    17 1359   BP: 112/48   Height: 1.676 m (5' 6\")   Weight: 74.844 kg (165 lb)    See H&P Prenatal Physical.  Wet mount: deferred        FHTs: 158        Fundal ht: 13     A:   1.  IUP @ 13w3d SANDRA: Estimated Date of Delivery: 7/10/17 per US         2.  S=D        3.    Patient Active Problem List    Diagnosis Date Noted   • Encounter for supervision of other normal pregnancy, second trimester 2017   • Elderly multigravida in second trimester 2017         P:  1.  GC/CT done. Pap done.         2.  Prenatal labs, including AFP ordered - lab slip given        3.  Discussed PNV, diet, and adequate water intake        4.  NOB packet given        5.  Return to office in 4 wks        6.  Complete OB US in 7 wks    "

## 2017-01-08 LAB
C TRACH DNA GENITAL QL NAA+PROBE: NEGATIVE
CYTOLOGY REG CYTOL: NORMAL
N GONORRHOEA DNA GENITAL QL NAA+PROBE: NEGATIVE
SPECIMEN SOURCE: NORMAL

## 2017-01-24 ENCOUNTER — HOSPITAL ENCOUNTER (OUTPATIENT)
Dept: LAB | Facility: MEDICAL CENTER | Age: 37
End: 2017-01-24
Attending: NURSE PRACTITIONER
Payer: COMMERCIAL

## 2017-01-24 DIAGNOSIS — Z34.82 ENCOUNTER FOR SUPERVISION OF OTHER NORMAL PREGNANCY, SECOND TRIMESTER: ICD-10-CM

## 2017-01-24 DIAGNOSIS — O09.522 ELDERLY MULTIGRAVIDA IN SECOND TRIMESTER: ICD-10-CM

## 2017-01-24 LAB
ABO GROUP BLD: NORMAL
AMORPHOUS CRYSTALS 1764: PRESENT /HPF
APPEARANCE UR: ABNORMAL
BASOPHILS # BLD AUTO: 0.02 K/UL (ref 0–0.12)
BASOPHILS NFR BLD AUTO: 0.2 % (ref 0–1.8)
BILIRUB UR QL STRIP.AUTO: NEGATIVE
BLD GP AB SCN SERPL QL: NORMAL
COLOR UR AUTO: YELLOW
CULTURE IF INDICATED INDCX: NO UA CULTURE
EOSINOPHIL # BLD: 0.03 K/UL (ref 0–0.51)
EOSINOPHIL NFR BLD AUTO: 0.3 % (ref 0–6.9)
EPITHELIAL CELLS 1715: ABNORMAL /HPF
ERYTHROCYTE [DISTWIDTH] IN BLOOD BY AUTOMATED COUNT: 41.5 FL (ref 35.9–50)
GLUCOSE UR STRIP.AUTO-MCNC: NEGATIVE MG/DL
HBV SURFACE AG SERPL QL IA: NEGATIVE
HCT VFR BLD AUTO: 36.5 % (ref 37–47)
HGB BLD-MCNC: 11.8 G/DL (ref 12–16)
HIV 1+2 AB+HIV1 P24 AG SERPL QL IA: NON REACTIVE
IMM GRANULOCYTES # BLD AUTO: 0.03 K/UL (ref 0–0.11)
IMM GRANULOCYTES NFR BLD AUTO: 0.3 % (ref 0–0.9)
KETONES UR STRIP.AUTO-MCNC: NEGATIVE MG/DL
LEUKOCYTE ESTERASE UR QL STRIP.AUTO: NEGATIVE
LYMPHOCYTES # BLD: 2.57 K/UL (ref 1–4.8)
LYMPHOCYTES NFR BLD AUTO: 26.7 % (ref 22–41)
MCH RBC QN AUTO: 29.7 PG (ref 27–33)
MCHC RBC AUTO-ENTMCNC: 32.3 G/DL (ref 33.6–35)
MCV RBC AUTO: 91.9 FL (ref 81.4–97.8)
MICRO URNS: ABNORMAL
MONOCYTES # BLD: 0.43 K/UL (ref 0–0.85)
MONOCYTES NFR BLD AUTO: 4.5 % (ref 0–13.4)
MUCOUS THREADS URNS QL MICRO: ABNORMAL /HPF
NEUTROPHILS # BLD: 6.53 K/UL (ref 2–7.15)
NEUTROPHILS NFR BLD AUTO: 68 % (ref 44–72)
NITRITE UR QL STRIP.AUTO: NEGATIVE
NRBC # BLD AUTO: 0 K/UL
NRBC BLD-RTO: 0 /100 WBC
PH UR: 6.5 [PH]
PLATELET # BLD AUTO: 239 K/UL (ref 164–446)
PMV BLD AUTO: 11.2 FL (ref 9–12.9)
PROT UR QL STRIP: NEGATIVE MG/DL
RBC # BLD AUTO: 3.97 M/UL (ref 4.2–5.4)
RBC #/AREA URNS HPF: ABNORMAL /HPF
RBC UR QL AUTO: NEGATIVE
RH BLD: NORMAL
RUBV IGG SERPL IA-ACNC: 231.5 IU/ML
SP GR UR STRIP.AUTO: 1.01
TREPONEMA PALLIDUM IGG+IGM AB [PRESENCE] IN SERUM OR PLASMA BY IMMUNOASSAY: NON REACTIVE
WBC # BLD AUTO: 9.6 K/UL (ref 4.8–10.8)
WBC #/AREA URNS HPF: ABNORMAL /HPF

## 2017-01-30 LAB
# FETUSES US: NORMAL
AFP MOM SERPL: 0.67
AFP SERPL-MCNC: 18 NG/ML
AGE - REPORTED: 35.8 YR
GA METHOD: NORMAL
GA: 15.14 WEEKS
HCG MOM SERPL: 0.49
HCG SERPL-ACNC: NORMAL IU/L
IDDM PATIENT QL: NO
INHIBIN A MOM SERPL: 0.7
INHIBIN A SERPL-MCNC: 126 PG/ML
INTEGRATED SCN PATIENT-IMP: NORMAL
PATHOLOGY STUDY: NORMAL
U ESTRIOL MOM SERPL: 1.59
U ESTRIOL SERPL-MCNC: 1.05 NG/ML

## 2017-02-02 ENCOUNTER — ROUTINE PRENATAL (OUTPATIENT)
Dept: OBGYN | Facility: CLINIC | Age: 37
End: 2017-02-02

## 2017-02-02 VITALS — DIASTOLIC BLOOD PRESSURE: 58 MMHG | SYSTOLIC BLOOD PRESSURE: 114 MMHG | WEIGHT: 170 LBS | BODY MASS INDEX: 27.45 KG/M2

## 2017-02-02 DIAGNOSIS — Z34.82 ENCOUNTER FOR SUPERVISION OF OTHER NORMAL PREGNANCY, SECOND TRIMESTER: ICD-10-CM

## 2017-02-02 DIAGNOSIS — O09.522 ELDERLY MULTIGRAVIDA IN SECOND TRIMESTER: ICD-10-CM

## 2017-02-02 PROCEDURE — 90040 PR PRENATAL FOLLOW UP: CPT | Performed by: PHYSICIAN ASSISTANT

## 2017-02-02 NOTE — MR AVS SNAPSHOT
Jaylene Sykes YovaniOneil   2017 1:30 PM   Routine Prenatal   MRN: [id number]    Department:  [facility]   Dept Phone:  [phone]    Description:  Female : 1980   Provider:  AGUSTIN Santana           Allergies as of 2017     Allergen Noted Reactions    Nkda [No Known Drug Allergy] 2010         You were diagnosed with     Encounter for supervision of other normal pregnancy, second trimester   [3472088]       Elderly multigravida in second trimester   [[id number]]         Vital Signs     Blood Pressure Weight Smoking Status             114/58 mmHg 77.111 kg (170 lb) Never Smoker          Basic Information     Date Of Birth Sex Race Ethnicity Preferred Language    1980 Female  or   Origin (Somali,Malagasy,Argentine,Jordanian, etc) Somali      Your appointments     Mar 01, 2017  1:30 PM   US PREG 60 with PREG CTR US 1   [facility] ([facility])    [facility]  [address] [address]   [phone]           For [facility] Pregnancy [place] patients only: 1. Please arrive 15 min prior to your appointment time. 2. If you're late, you will be rescheduled for the next available appointment. 3. If you need to reschedule your appointment, please call us at [phone] 48 hours prior to your appointment. 4. Do not bring children as they will not be allowed in exam room. 5. Only one family member may be present in room during exam. 6. The exam will be 30-60 minutes depending on the exam ordered by the physician. 7. The sonographer is not allowed to discuss findings during the exam. Your provider will go over the results with you at your next appointment. 8. The purpose of this ultrasound is to determine if baby is healthy. Diagnostic ultrasounds are NOT to determine the gender of the baby. 9. NO photography or video recording is allowed in exam room. 10. NO cell phones allowed in the exam room.  INFORMACION SOBRE ROBERTS ULTRASONIDO 1. Por favor de llegar 15 minutos antes de roberts yamini. 2. Si llega tarde, le tenemos que cambiar la yamini para otra fecha. 3. Si necesita cambiar roberts yamini, por favor llame 48 horas antes de la yamini. [phone] 4. Por favor no traer niños. No se permiten en cuarto de Ultrasonido. 5. Solamente se permite maryam persona en el cuarto shannan el examen. 6. El examen dura 30-60 minutos, dependiendo del examen ordenado por el Doctor. 7. El Sonógrafo no está autorizado hablar sobre roberts examen. Roberts doctor o partera le va explicar los resultados en roberts próxima yamini. 8. El propósito del Ultrasonido es para determinar si roberts cleo viene saludable. No es para determinar el sexo de roberts cleo. 9. Por favor no fotos o cámaras de grabar. 10. No celulares permitidos en el cuarto de examen.            Mar 02, 2017  1:30 PM   OB Follow Up with ORIANA WhippleP.   [facility] [address] [address]   [phone]              Problem List              ICD-10-CM Priority Class Noted - Resolved    Encounter for supervision of other normal pregnancy, second trimester Z34.82   1/5/2017 - Present    Elderly multigravida in second trimester O09.522   1/5/2017 - Present      Health Maintenance     Patient has no pending health maintenance at this time      Current Immunizations     Influenza TIV (IM) 2/22/2010  1:45 AM    Tdap Vaccine 2/22/2010  1:45 AM      Below and/or attached are the medications your provider expects you to take. Review all of your home medications and newly ordered medications with your provider and/or pharmacist. Follow medication instructions as directed by your provider and/or pharmacist. Please keep your medication list with you and share with your provider. Update the information when medications are discontinued, doses are changed, or new medications (including over-the-counter products) are added; and carry medication information at all times in the  event of emergency situations     Allergies:  NKDA - (reactions not documented)               Medications  Valid as of: February 02, 2017 -  1:44 PM    Generic Name Brand Name Tablet Size Instructions for use    Prenatal MV-Min-Fe Fum-FA-DHA   Take  by mouth.        .                 Medicines prescribed today were sent to:     [facility] [address] [facility] ([place]), [place] [address]    [address] [facility] ([place]) [place] [address]    Phone: [phone] Fax: [phone]    Open 24 Hours?: No      Medication refill instructions:       If your prescription bottle indicates you have medication refills left, it is not necessary to call your provider’s office. Please contact your pharmacy and they will refill your medication.    If your prescription bottle indicates you do not have any refills left, you may request refills at any time through one of the following ways: The online [employer] system (except Urgent Care), by calling your provider’s office, or by asking your pharmacy to contact your provider’s office with a refill request. Medication refills are processed only during regular business hours and may not be available until the next business day. Your provider may request additional information or to have a follow-up visit with you prior to refilling your medication.   *Please Note: Medication refills are assigned a new Rx number when refilled electronically. Your pharmacy may indicate that no refills were authorized even though a new prescription for the same medication is available at the pharmacy. Please request the medicine by name with the pharmacy before contacting your provider for a refill.           NextGreatPlacehart Status: Patient Declined

## 2017-02-02 NOTE — PROGRESS NOTES
Ob F/U  Pt c/o cramping and pelvic pain.   Good phone [phone]  AFP and PNP lab work done  U/S scheduled for 3/1/2017

## 2017-02-02 NOTE — PROGRESS NOTES
Pt has no complaints with cramping, bleeding or pain, but pt notes a lot of pressure in low abd. +FM but little. Pt to try maternity belt prn, and PTL precautions reviewed. PNL, PAP, GC/CT, AFP wnl - pt notified of results. US 3/1. RTC 4 wk or sooner prn.

## 2017-03-01 ENCOUNTER — DATING (OUTPATIENT)
Dept: OBGYN | Facility: CLINIC | Age: 37
End: 2017-03-01

## 2017-03-01 ENCOUNTER — APPOINTMENT (OUTPATIENT)
Dept: RADIOLOGY | Facility: IMAGING CENTER | Age: 37
End: 2017-03-01
Attending: NURSE PRACTITIONER

## 2017-03-01 DIAGNOSIS — Z34.82 ENCOUNTER FOR SUPERVISION OF OTHER NORMAL PREGNANCY, SECOND TRIMESTER: ICD-10-CM

## 2017-03-01 PROCEDURE — 76805 OB US >/= 14 WKS SNGL FETUS: CPT | Mod: TC | Performed by: NURSE PRACTITIONER

## 2017-03-02 ENCOUNTER — ROUTINE PRENATAL (OUTPATIENT)
Dept: OBGYN | Facility: CLINIC | Age: 37
End: 2017-03-02

## 2017-03-02 VITALS — WEIGHT: 169 LBS | DIASTOLIC BLOOD PRESSURE: 54 MMHG | SYSTOLIC BLOOD PRESSURE: 116 MMHG | BODY MASS INDEX: 27.29 KG/M2

## 2017-03-02 DIAGNOSIS — Z34.82 ENCOUNTER FOR SUPERVISION OF OTHER NORMAL PREGNANCY, SECOND TRIMESTER: ICD-10-CM

## 2017-03-02 DIAGNOSIS — O09.522 ELDERLY MULTIGRAVIDA IN SECOND TRIMESTER: ICD-10-CM

## 2017-03-02 PROCEDURE — 90040 PR PRENATAL FOLLOW UP: CPT | Performed by: NURSE PRACTITIONER

## 2017-03-02 NOTE — MR AVS SNAPSHOT
Jaylene Mony Deleon   3/2/2017 1:30 PM   Routine Prenatal   MRN: [id number]    Department:  [facility]   Dept Phone:  [phone]    Description:  Female : 1980   Provider:  Sherry Escudero D.N.P.           Allergies as of 3/2/2017     Allergen Noted Reactions    Nkda [No Known Drug Allergy] 2010         You were diagnosed with     Encounter for supervision of other normal pregnancy, second trimester   [[id number]]       Elderly multigravida in second trimester   [[id number]]         Vital Signs     Blood Pressure Weight Smoking Status             116/54 mmHg 76.658 kg (169 lb) Never Smoker          Basic Information     Date Of Birth Sex Race Ethnicity Preferred Language    1980 Female  or   Origin (Ugandan,Uzbek,Venezuelan,Maltese, etc) Ugandan      Your appointments     Mar 30, 2017  1:45 PM   OB Follow Up with ADAM Spears   [facility] [address]  [facility] [address] [phone]              Problem List              ICD-10-CM Priority Class Noted - Resolved    Encounter for supervision of other normal pregnancy, second trimester Z34.82   2017 - Present    Elderly multigravida in second trimester O09.522   2017 - Present      Health Maintenance        Date Due Completion Dates    IMM INFLUENZA (1) 2016    PAP SMEAR 2020    IMM DTaP/Tdap/Td Vaccine (2 - Td) 2020            Current Immunizations     Influenza TIV (IM) 2010  1:45 AM    Tdap Vaccine 2010  1:45 AM      Below and/or attached are the medications your provider expects you to take. Review all of your home medications and newly ordered medications with your provider and/or pharmacist. Follow medication instructions as directed by your provider and/or pharmacist. Please keep your medication list with you and share with your provider. Update the information when medications are discontinued, doses  are changed, or new medications (including over-the-counter products) are added; and carry medication information at all times in the event of emergency situations     Allergies:  NKDA - (reactions not documented)               Medications  Valid as of: March 02, 2017 -  1:39 PM    Generic Name Brand Name Tablet Size Instructions for use    Prenatal MV-Min-Fe Fum-FA-DHA   Take  by mouth.        .                 Medicines prescribed today were sent to:     [address] ([place]), [place] - [address] [address] [facility] ([place]) [place] [address]    Phone: [phone] Fax: [phone]    Open 24 Hours?: No      Medication refill instructions:       If your prescription bottle indicates you have medication refills left, it is not necessary to call your provider’s office. Please contact your pharmacy and they will refill your medication.    If your prescription bottle indicates you do not have any refills left, you may request refills at any time through one of the following ways: The online [employer] system (except Urgent Care), by calling your provider’s office, or by asking your pharmacy to contact your provider’s office with a refill request. Medication refills are processed only during regular business hours and may not be available until the next business day. Your provider may request additional information or to have a follow-up visit with you prior to refilling your medication.   *Please Note: Medication refills are assigned a new Rx number when refilled electronically. Your pharmacy may indicate that no refills were authorized even though a new prescription for the same medication is available at the pharmacy. Please request the medicine by name with the pharmacy before contacting your provider for a refill.           SaltStackhart Status: Patient Declined

## 2017-03-02 NOTE — PROGRESS NOTES
Pt here today for OB follow up  Pt states having pain on right side.   Reports +FM  WT:169lb  BP:116/54  Good # [phone]  Pt has u/s 3/1/17

## 2017-03-02 NOTE — PROGRESS NOTES
S) Pt is a 36 y.o.   at 21w3d  gestation. Routine prenatal care today. States side discomfort only.  Reports good  fetal movement. Denies cramping, bleeding or leaking of fluid. Denies dysuria. Generally feels well today. Good self-care activities identified. Denies headaches.     O) see flow         Filed Vitals:    17 1329   BP: 116/54   Weight: 76.658 kg (169 lb)           Lab: normal prenatal panel, normal glucose       Pertinent ultrasound - normal fetal survey          A) IUP at 21w3d       S=D         Patient Active Problem List    Diagnosis Date Noted   • Encounter for supervision of other normal pregnancy, second trimester 2017   • Elderly multigravida in second trimester 2017       P) s/s ptl vs general discomforts. Fetal movements reviewed. General ed and anticipatory guidance. Nutrition/exercise/vitamin. Plans breast.  Continue PNV.

## 2017-03-06 ENCOUNTER — TELEPHONE (OUTPATIENT)
Dept: OBGYN | Facility: CLINIC | Age: 37
End: 2017-03-06

## 2017-03-07 ENCOUNTER — ROUTINE PRENATAL (OUTPATIENT)
Dept: OBGYN | Facility: CLINIC | Age: 37
End: 2017-03-07

## 2017-03-07 VITALS — WEIGHT: 170 LBS | SYSTOLIC BLOOD PRESSURE: 106 MMHG | BODY MASS INDEX: 27.45 KG/M2 | DIASTOLIC BLOOD PRESSURE: 54 MMHG

## 2017-03-07 DIAGNOSIS — Z34.82 ENCOUNTER FOR SUPERVISION OF OTHER NORMAL PREGNANCY, SECOND TRIMESTER: ICD-10-CM

## 2017-03-07 DIAGNOSIS — O09.522 ELDERLY MULTIGRAVIDA IN SECOND TRIMESTER: ICD-10-CM

## 2017-03-07 LAB
APPEARANCE UR: NORMAL
BILIRUB UR STRIP-MCNC: NORMAL MG/DL
COLOR UR AUTO: NORMAL
GLUCOSE UR STRIP.AUTO-MCNC: NORMAL MG/DL
KETONES UR STRIP.AUTO-MCNC: NORMAL MG/DL
LEUKOCYTE ESTERASE UR QL STRIP.AUTO: NORMAL
NITRITE UR QL STRIP.AUTO: NORMAL
PH UR STRIP.AUTO: 7.5 [PH] (ref 5–8)
PROT UR QL STRIP: NORMAL MG/DL
RBC UR QL AUTO: NORMAL
SP GR UR STRIP.AUTO: 1.01
UROBILINOGEN UR STRIP-MCNC: NORMAL MG/DL

## 2017-03-07 PROCEDURE — 81002 URINALYSIS NONAUTO W/O SCOPE: CPT | Performed by: NURSE PRACTITIONER

## 2017-03-07 PROCEDURE — 90040 PR PRENATAL FOLLOW UP: CPT | Performed by: NURSE PRACTITIONER

## 2017-03-07 RX ORDER — METRONIDAZOLE 500 MG/1
500 TABLET ORAL 2 TIMES DAILY WITH MEALS
Qty: 14 TAB | Refills: 0 | Status: SHIPPED | OUTPATIENT
Start: 2017-03-07 | End: 2017-03-14

## 2017-03-07 NOTE — TELEPHONE ENCOUNTER
Returned pt's phone call. Pt stated she has been feeling pelvic pressure since last Saturday every time baby moves. Denies vaginal bleeding. LOF or cramping. Pt states the pelvic pressure goes away when with resting but then come back. Per consult with Dr Ulrich. If her pressure is accompanied with contractions she needs to go to hospital.

## 2017-03-07 NOTE — PROGRESS NOTES
Complains of cramping since Sat, last intercourse Sun, No constipation, diarrhea, no nausea/vomiting.  +BV, RX flagyl plus instructions.  S&S of  labor reviewed.

## 2017-03-07 NOTE — PROGRESS NOTES
Pt here today as fit in for pelvic pressure.   Pt states having pelvic pressure and pain.   Reports +FM  WT:170lb  BP:106/54  Good # [phone]

## 2017-03-07 NOTE — MR AVS SNAPSHOT
Jaylene Mony Deleon   3/7/2017 1:45 PM   Routine Prenatal   MRN: [id number]    Department:  [facility]   Dept Phone:  [phone]    Description:  Female : 1980   Provider:  RAYNE Garrett           Allergies as of 3/7/2017     Allergen Noted Reactions    Nkda [No Known Drug Allergy] 2010         You were diagnosed with     Encounter for supervision of other normal pregnancy, second trimester   [[id number]]       Elderly multigravida in second trimester   [[id number]]         Vital Signs     Blood Pressure Weight Smoking Status             106/54 mmHg 77.111 kg (170 lb) Never Smoker          Basic Information     Date Of Birth Sex Race Ethnicity Preferred Language    1980 Female  or   Origin (Greenlandic,Bolivian,Malian,Chris, etc) Greenlandic      Your appointments     Mar 30, 2017  1:45 PM   OB Follow Up with RAYNE Garrett   [facility] ([address] [address] [phone]              Problem List              ICD-10-CM Priority Class Noted - Resolved    Encounter for supervision of other normal pregnancy, second trimester Z34.82   2017 - Present    Elderly multigravida in second trimester O09.522   2017 - Present      Health Maintenance        Date Due Completion Dates    IMM INFLUENZA (1) 2016    PAP SMEAR 2020    IMM DTaP/Tdap/Td Vaccine (2 - Td) 2020            Results     POCT Urinalysis      Component Value Standard Range & Units    POC Color  Negative    POC Appearance  Negative    POC Leukocyte Esterase TRACE Negative    POC Nitrites NEG Negative    POC Urobiligen  Negative (0.2) mg/dL    POC Protein TRACE Negative mg/dL    POC Urine PH 7.5 5.0 - 8.0    POC Blood TRACE Negative    POC Specific Gravity 1.010 <1.005 - >1.030    POC Ketones MODERATE Negative mg/dL    POC Biliruben  Negative mg/dL    POC Glucose NEG Negative mg/dL                           Current Immunizations     Influenza TIV (IM) 2/22/2010  1:45 AM    Tdap Vaccine 2/22/2010  1:45 AM      Below and/or attached are the medications your provider expects you to take. Review all of your home medications and newly ordered medications with your provider and/or pharmacist. Follow medication instructions as directed by your provider and/or pharmacist. Please keep your medication list with you and share with your provider. Update the information when medications are discontinued, doses are changed, or new medications (including over-the-counter products) are added; and carry medication information at all times in the event of emergency situations     Allergies:  NKDA - (reactions not documented)               Medications  Valid as of: March 07, 2017 -  2:42 PM    Generic Name Brand Name Tablet Size Instructions for use    MetroNIDAZOLE (Tab) FLAGYL 500 MG Take 1 Tab by mouth 2 times a day, with meals for 7 days.        Prenatal MV-Min-Fe Fum-FA-DHA   Take  by mouth.        .                 Medicines prescribed today were sent to:     [address] ([place]), [place] - [address] [place] [address] [facility] ([place] [place] [address]    Phone: [phone] Fax: [phone]    Open 24 Hours?: No      Medication refill instructions:       If your prescription bottle indicates you have medication refills left, it is not necessary to call your provider’s office. Please contact your pharmacy and they will refill your medication.    If your prescription bottle indicates you do not have any refills left, you may request refills at any time through one of the following ways: The online [employer] system (except Urgent Care), by calling your provider’s office, or by asking your pharmacy to contact your provider’s office with a refill request. Medication refills are processed only during regular business hours and may not be available until the next business day. Your provider may request additional information or  to have a follow-up visit with you prior to refilling your medication.   *Please Note: Medication refills are assigned a new Rx number when refilled electronically. Your pharmacy may indicate that no refills were authorized even though a new prescription for the same medication is available at the pharmacy. Please request the medicine by name with the pharmacy before contacting your provider for a refill.           MyChart Status: Patient Declined

## 2017-03-30 ENCOUNTER — HOSPITAL ENCOUNTER (OUTPATIENT)
Dept: LAB | Facility: MEDICAL CENTER | Age: 37
End: 2017-03-30
Attending: NURSE PRACTITIONER
Payer: COMMERCIAL

## 2017-03-30 ENCOUNTER — ROUTINE PRENATAL (OUTPATIENT)
Dept: OBGYN | Facility: CLINIC | Age: 37
End: 2017-03-30

## 2017-03-30 VITALS — BODY MASS INDEX: 27.45 KG/M2 | DIASTOLIC BLOOD PRESSURE: 60 MMHG | SYSTOLIC BLOOD PRESSURE: 110 MMHG | WEIGHT: 170 LBS

## 2017-03-30 DIAGNOSIS — O09.522 ELDERLY MULTIGRAVIDA IN SECOND TRIMESTER: ICD-10-CM

## 2017-03-30 DIAGNOSIS — Z34.82 ENCOUNTER FOR SUPERVISION OF OTHER NORMAL PREGNANCY, SECOND TRIMESTER: ICD-10-CM

## 2017-03-30 LAB
GLUCOSE 1H P 50 G GLC PO SERPL-MCNC: 137 MG/DL (ref 70–139)
HCT VFR BLD AUTO: 34.8 % (ref 37–47)
HGB BLD-MCNC: 11.8 G/DL (ref 12–16)
TREPONEMA PALLIDUM IGG+IGM AB [PRESENCE] IN SERUM OR PLASMA BY IMMUNOASSAY: NON REACTIVE

## 2017-03-30 PROCEDURE — 90040 PR PRENATAL FOLLOW UP: CPT | Performed by: NURSE PRACTITIONER

## 2017-03-30 NOTE — MR AVS SNAPSHOT
Jaylene Bellosmin YovaniOneil   3/30/2017 1:45 PM   Routine Prenatal   MRN: [id number]    Department:  [facility]   Dept Phone:  [phone]    Description:  Female : 1980   Provider:  RAYNE Garrett           Allergies as of 3/30/2017     Allergen Noted Reactions    Nkda [No Known Drug Allergy] 2010         You were diagnosed with     Encounter for supervision of other normal pregnancy, second trimester   [[id number]]       Elderly multigravida in second trimester   [[id number]]         Vital Signs     Blood Pressure Weight Smoking Status             110/60 mmHg 77.111 kg (170 lb) Never Smoker          Basic Information     Date Of Birth Sex Race Ethnicity Preferred Language    1980 Female  or   Origin (Bermudian,Citizen of [place],British,Chris, etc) Bermudian      Your appointments     2017  2:00 PM   OB Follow Up with RAYNE Garrett   [facility] [address]  [facility] [address]   [phone]              Problem List              ICD-10-CM Priority Class Noted - Resolved    Encounter for supervision of other normal pregnancy, second trimester Z34.82   2017 - Present    Elderly multigravida in second trimester O09.522   2017 - Present      Health Maintenance        Date Due Completion Dates    IMM INFLUENZA (1) 2016    PAP SMEAR 2020    IMM DTaP/Tdap/Td Vaccine (2 - Td) 2020            Current Immunizations     Influenza TIV (IM) 2010  1:45 AM    Tdap Vaccine 2010  1:45 AM      Below and/or attached are the medications your provider expects you to take. Review all of your home medications and newly ordered medications with your provider and/or pharmacist. Follow medication instructions as directed by your provider and/or pharmacist. Please keep your medication list with you and share with your provider. Update the information when medications are discontinued,  doses are changed, or new medications (including over-the-counter products) are added; and carry medication information at all times in the event of emergency situations     Allergies:  NKDA - (reactions not documented)               Medications  Valid as of: March 30, 2017 -  2:12 PM    Generic Name Brand Name Tablet Size Instructions for use    Prenatal MV-Min-Fe Fum-FA-DHA   Take  by mouth.        .                 Medicines prescribed today were sent to:     [address] ([place]), [place] - [address] [address] [facility] ([place]) [place] [address]    Phone: [phone] Fax: [phone]    Open 24 Hours?: No      Medication refill instructions:       If your prescription bottle indicates you have medication refills left, it is not necessary to call your provider’s office. Please contact your pharmacy and they will refill your medication.    If your prescription bottle indicates you do not have any refills left, you may request refills at any time through one of the following ways: The online [employer] system (except Urgent Care), by calling your provider’s office, or by asking your pharmacy to contact your provider’s office with a refill request. Medication refills are processed only during regular business hours and may not be available until the next business day. Your provider may request additional information or to have a follow-up visit with you prior to refilling your medication.   *Please Note: Medication refills are assigned a new Rx number when refilled electronically. Your pharmacy may indicate that no refills were authorized even though a new prescription for the same medication is available at the pharmacy. Please request the medicine by name with the pharmacy before contacting your provider for a refill.        Your To Do List     Future Labs/Procedures Complete By Expires    GLUCOSE 1HR GESTATIONAL  As directed 3/30/2018    HCT  As directed 3/30/2018    HGB  As directed 3/30/2018     T.PALLIDUM AB EIA  As directed 3/30/2018         MyChart Status: Patient Declined

## 2017-03-30 NOTE — PROGRESS NOTES
Pt here today for OB follow up  Reports +FM  Denies any complaints  Labs ordered  WT:170lb  BP: 110/60  Good # [phone]

## 2017-04-20 ENCOUNTER — ROUTINE PRENATAL (OUTPATIENT)
Dept: OBGYN | Facility: CLINIC | Age: 37
End: 2017-04-20

## 2017-04-20 VITALS — SYSTOLIC BLOOD PRESSURE: 108 MMHG | DIASTOLIC BLOOD PRESSURE: 58 MMHG | WEIGHT: 173 LBS | BODY MASS INDEX: 27.94 KG/M2

## 2017-04-20 DIAGNOSIS — O09.522 ELDERLY MULTIGRAVIDA IN SECOND TRIMESTER: ICD-10-CM

## 2017-04-20 DIAGNOSIS — Z34.82 ENCOUNTER FOR SUPERVISION OF OTHER NORMAL PREGNANCY, SECOND TRIMESTER: ICD-10-CM

## 2017-04-20 PROCEDURE — 90040 PR PRENATAL FOLLOW UP: CPT | Performed by: NURSE PRACTITIONER

## 2017-04-20 PROCEDURE — 90471 IMMUNIZATION ADMIN: CPT | Performed by: NURSE PRACTITIONER

## 2017-04-20 PROCEDURE — 90715 TDAP VACCINE 7 YRS/> IM: CPT | Performed by: NURSE PRACTITIONER

## 2017-04-20 NOTE — PROGRESS NOTES
Pt here today for OB follow up  Reports +FM  WT: 173 lb  BP: 108/58  Pt states she has vaginal burning during and after intercourse. Pt also reports burning sensations on her belly button.   PAMELA sheet given and explained today  BTL discussed, Pt declines.  Desires Tdap vaccine   Good # [phone]    Tdap vaccine given. Left Deltoid. VIS given and screening check list reviewed with pt.

## 2017-04-20 NOTE — Clinical Note
Cuente los Movimientos de roberts Bebé  Otro paso importante para la idania de roberts bebé    Jaylene Mony Deleon     [facility]            Dept: [phone]    ¿Cuántas semanas tiene de embarazo? 28w3d    Fecha cuando tiene que comenzar a contar el movimiento: Hoy 04/20/2017                  [place] debe usar greg diagrama    Maryam manera en que roberts doctor puede controlar a idania de roberts bebé es sabiendo cuantas veces se mueve roberts bebé en el útero, o por medio de las “pataditas”.  Usted podrá ayudarle a roberts médico al usar cada día el siguiente diagrama.    Cada día, usted debe prestar atención a cuantas horas le lleva a roberts bebé moverse 10 veces.  Comience a contar en la mañana, lo antes posible después de haberse levantado.    · Primeramente, escriba la hora en que se mueve roberts bebé, hasta llegar a 10 veces.  · Colóquele un check o palomita a cada cuadrito cada vez que roberts bebé se mueva hasta que complete 10 veces.  · Escriba la hora cuando termine de contar 10 veces en la última columna.  · Sume el total del tiempo que le llevó contar los 10 movimientos.  · Finalmente, complete el cuadrito de cuantas horas le llevó hacerlo.    Después de ha contado los 10 movimientos, ya no tendrá que contar los demás movimientos por el anthony del día.  A la mañana siguiente, comience a contar de nuevo cuantas veces se mueve el bebé desde el momento en que se levante.    ¿Qué tendría que considerarse un “movimiento”?  Es difícil de decirlo porque es distinto de maryam madre a otra, y de un embarazo a otro.  Lo importante es que cuente el movimiento de la misma manera shannan el transcurso de roberts embarazo.  Si tiene preguntas adicionales, pregúntele a roberts doctor.    ¡Cuente cuidadosamente cada día!     MUESTRA:  Semana 28    ¿Cuántas horas le ha llevado sentir 10 movimientos?        Hora de Inicio     1     2     3     4     5     6     7     8     9     10   Hora de Finlizar   Monica. 8:20 ·  ·  ·  ·  ·  ·  ·  ·  ·  ·  11:40   Mar.                Mié.               Jue.               Vie.               Sáb.               Dom.                 IMPORTANTE:  Usted debe contactar a roberts doctor si le lleva más de 2 horas sentir 10 movimientos de roberts bebé.    Cada mañana, escriba la hora de inicio y comience a contar los movimientos de roberts bebé.  Hágalo colocándole un check o palomita a cada cuadrito cada vez que sienta un movimiento de roberts bebé.  Cuando haya sentido 10 “pataditas”, escriba la hora en que terminó de contar en la última columna.  Luego, complete en la cajita (arriba de la batsheva de check o palomita) el número total de horas que le llevó hacerlo.  Asegúrese de leer completamente las instrucciones en la página anterior.

## 2017-04-20 NOTE — MR AVS SNAPSHOT
Jaylene Sykes YovaniOneil   2017 2:00 PM   Routine Prenatal   MRN: [id number]    [facility]   Dept Phone:  [phone]    Description:  Female : 1980   Provider:  RAYNE Garrett           Allergies as of 2017     Allergen Noted Reactions    Nkda [No Known Drug Allergy] 2010         You were diagnosed with     Encounter for supervision of other normal pregnancy, second trimester   [7698760]       Elderly multigravida in second trimester   [[id number]]         Vital Signs     Blood Pressure Weight Smoking Status             108/58 mmHg 78.472 kg (173 lb) Never Smoker          Basic Information     Date Of Birth Sex Race Ethnicity Preferred Language    1980 Female  or   Origin (Polish,Thai,Tongan,Chris, etc) Polish      Problem List              ICD-10-CM Priority Class Noted - Resolved    Encounter for supervision of other normal pregnancy, second trimester Z34.82   2017 - Present    Elderly multigravida in second trimester O09.522   2017 - Present      Health Maintenance        Date Due Completion Dates    PAP SMEAR 2020    IMM DTaP/Tdap/Td Vaccine (2 - Td) 2020            Current Immunizations     Influenza TIV (IM) 2010  1:45 AM    Tdap Vaccine 2017  2:32 PM, 2010  1:45 AM      Below and/or attached are the medications your provider expects you to take. Review all of your home medications and newly ordered medications with your provider and/or pharmacist. Follow medication instructions as directed by your provider and/or pharmacist. Please keep your medication list with you and share with your provider. Update the information when medications are discontinued, doses are changed, or new medications (including over-the-counter products) are added; and carry medication information at all times in the event of emergency situations     Allergies:  NKDA - (reactions not  documented)               Medications  Valid as of: April 20, 2017 -  2:41 PM    Generic Name Brand Name Tablet Size Instructions for use    Prenatal MV-Min-Fe Fum-FA-DHA   Take  by mouth.        .                 Medicines prescribed today were sent to:     [facility] [address] [facility] ([place]), [place] - [id number] [facility] ([place]) [place] [address]    Phone: [phone] Fax: [phone]    Open 24 Hours?: No      Medication refill instructions:       If your prescription bottle indicates you have medication refills left, it is not necessary to call your provider’s office. Please contact your pharmacy and they will refill your medication.    If your prescription bottle indicates you do not have any refills left, you may request refills at any time through one of the following ways: The online [employer] system (except Urgent Care), by calling your provider’s office, or by asking your pharmacy to contact your provider’s office with a refill request. Medication refills are processed only during regular business hours and may not be available until the next business day. Your provider may request additional information or to have a follow-up visit with you prior to refilling your medication.   *Please Note: Medication refills are assigned a new Rx number when refilled electronically. Your pharmacy may indicate that no refills were authorized even though a new prescription for the same medication is available at the pharmacy. Please request the medicine by name with the pharmacy before contacting your provider for a refill.           MyChart Status: Patient Declined

## 2017-04-20 NOTE — PROGRESS NOTES
Probably has a rebound yeast infection from taking flagyl last month.  Recommend OTC lotrimen plus instructions.  Start daily kick counts.  No other complaints.

## 2017-05-04 ENCOUNTER — ROUTINE PRENATAL (OUTPATIENT)
Dept: OBGYN | Facility: CLINIC | Age: 37
End: 2017-05-04

## 2017-05-04 VITALS — BODY MASS INDEX: 28.42 KG/M2 | WEIGHT: 176 LBS | SYSTOLIC BLOOD PRESSURE: 124 MMHG | DIASTOLIC BLOOD PRESSURE: 60 MMHG

## 2017-05-04 DIAGNOSIS — Z34.82 ENCOUNTER FOR SUPERVISION OF OTHER NORMAL PREGNANCY, SECOND TRIMESTER: ICD-10-CM

## 2017-05-04 DIAGNOSIS — O09.522 ELDERLY MULTIGRAVIDA IN SECOND TRIMESTER: ICD-10-CM

## 2017-05-04 PROCEDURE — 90040 PR PRENATAL FOLLOW UP: CPT | Performed by: PHYSICIAN ASSISTANT

## 2017-05-04 NOTE — MR AVS SNAPSHOT
Jaylene Bellosmin Adrienne   2017 3:15 PM   Routine Prenatal   MRN: [id number]    [facility]   Dept Phone:  [phone]    Description:  Female : 1980   Provider:  AGUSTIN Santana           Allergies as of 2017     Allergen Noted Reactions    Nkda [No Known Drug Allergy] 2010         You were diagnosed with     Encounter for supervision of other normal pregnancy, second trimester   [[id number]]       Elderly multigravida in second trimester   [[id number]]         Vital Signs     Blood Pressure Weight Smoking Status             124/60 mmHg 79.833 kg (176 lb) Never Smoker          Basic Information     Date Of Birth Sex Race Ethnicity Preferred Language    1980 Female  or   Origin ([place] citizen,Egyptian,Liberian,Paraguayan, etc) [place] citizen      Problem List              ICD-10-CM Priority Class Noted - Resolved    Encounter for supervision of other normal pregnancy, second trimester Z34.82   2017 - Present    Elderly multigravida in second trimester O09.522   2017 - Present      Health Maintenance        Date Due Completion Dates    PAP SMEAR 2020    IMM DTaP/Tdap/Td Vaccine (3 - Td) 2027, 2010            Current Immunizations     Influenza TIV (IM) 2010  1:45 AM    Tdap Vaccine 2017  2:32 PM, 2010  1:45 AM      Below and/or attached are the medications your provider expects you to take. Review all of your home medications and newly ordered medications with your provider and/or pharmacist. Follow medication instructions as directed by your provider and/or pharmacist. Please keep your medication list with you and share with your provider. Update the information when medications are discontinued, doses are changed, or new medications (including over-the-counter products) are added; and carry medication information at all times in the event of emergency situations     Allergies:  NKDA - (reactions not  documented)               Medications  Valid as of: May 04, 2017 -  3:34 PM    Generic Name Brand Name Tablet Size Instructions for use    Prenatal MV-Min-Fe Fum-FA-DHA   Take  by mouth.        .                 Medicines prescribed today were sent to:     [facility] [address] [facility] ([place]), [place] - [address] [facility] ([place]) [place] [address]    Phone: [phone] Fax: [phone]    Open 24 Hours?: No      Medication refill instructions:       If your prescription bottle indicates you have medication refills left, it is not necessary to call your provider’s office. Please contact your pharmacy and they will refill your medication.    If your prescription bottle indicates you do not have any refills left, you may request refills at any time through one of the following ways: The online [employer] system (except Urgent Care), by calling your provider’s office, or by asking your pharmacy to contact your provider’s office with a refill request. Medication refills are processed only during regular business hours and may not be available until the next business day. Your provider may request additional information or to have a follow-up visit with you prior to refilling your medication.   *Please Note: Medication refills are assigned a new Rx number when refilled electronically. Your pharmacy may indicate that no refills were authorized even though a new prescription for the same medication is available at the pharmacy. Please request the medicine by name with the pharmacy before contacting your provider for a refill.        Other Notes About Your Plan     GIRL - Louise           [facility] Status: Patient Declined

## 2017-05-19 ENCOUNTER — ROUTINE PRENATAL (OUTPATIENT)
Dept: OBGYN | Facility: CLINIC | Age: 37
End: 2017-05-19

## 2017-05-19 VITALS — DIASTOLIC BLOOD PRESSURE: 62 MMHG | WEIGHT: 171.4 LBS | BODY MASS INDEX: 27.68 KG/M2 | SYSTOLIC BLOOD PRESSURE: 114 MMHG

## 2017-05-19 DIAGNOSIS — O09.522 ELDERLY MULTIGRAVIDA IN SECOND TRIMESTER: ICD-10-CM

## 2017-05-19 DIAGNOSIS — Z34.82 ENCOUNTER FOR SUPERVISION OF OTHER NORMAL PREGNANCY, SECOND TRIMESTER: Primary | ICD-10-CM

## 2017-05-19 PROCEDURE — 90040 PR PRENATAL FOLLOW UP: CPT | Performed by: NURSE PRACTITIONER

## 2017-05-19 NOTE — MR AVS SNAPSHOT
Jaylene Sykes YovaniOneil   2017 4:00 PM   Routine Prenatal   MRN: [id number]    [facility]   Dept Phone:  [phone]    Description:  Female : 1980   Provider:  Fallon Pearson C.N.M.           Allergies as of 2017     Allergen Noted Reactions    Nkda [No Known Drug Allergy] 2010         You were diagnosed with     Encounter for supervision of other normal pregnancy, second trimester   [[id number]]  -  Primary     Elderly multigravida in second trimester   [[id number]]         Vital Signs     Blood Pressure Weight Smoking Status             114/62 mmHg 77.747 kg (171 lb 6.4 oz) Never Smoker          Basic Information     Date Of Birth Sex Race Ethnicity Preferred Language    1980 Female  or   Origin (Malawian,Belarusian,Gabonese,Chris, etc) Malawian      Problem List              ICD-10-CM Priority Class Noted - Resolved    Encounter for supervision of other normal pregnancy, second trimester Z34.82   2017 - Present    Elderly multigravida in second trimester O09.522   2017 - Present      Health Maintenance        Date Due Completion Dates    PAP SMEAR 2020    IMM DTaP/Tdap/Td Vaccine (3 - Td) 2027, 2010            Current Immunizations     Influenza TIV (IM) 2010  1:45 AM    Tdap Vaccine 2017  2:32 PM, 2010  1:45 AM      Below and/or attached are the medications your provider expects you to take. Review all of your home medications and newly ordered medications with your provider and/or pharmacist. Follow medication instructions as directed by your provider and/or pharmacist. Please keep your medication list with you and share with your provider. Update the information when medications are discontinued, doses are changed, or new medications (including over-the-counter products) are added; and carry medication information at all times in the event of emergency situations     Allergies:   NKDA - (reactions not documented)               Medications  Valid as of: May 19, 2017 -  4:16 PM    Generic Name Brand Name Tablet Size Instructions for use    Prenatal MV-Min-Fe Fum-FA-DHA   Take  by mouth.        .                 Medicines prescribed today were sent to:     [facility] ([place]), [place] - [address] [address] [facility] ([place]) [place] [address]    Phone: [phone] Fax: [phone]    Open 24 Hours?: No      Medication refill instructions:       If your prescription bottle indicates you have medication refills left, it is not necessary to call your provider’s office. Please contact your pharmacy and they will refill your medication.    If your prescription bottle indicates you do not have any refills left, you may request refills at any time through one of the following ways: The online Written system (except Urgent Care), by calling your provider’s office, or by asking your pharmacy to contact your provider’s office with a refill request. Medication refills are processed only during regular business hours and may not be available until the next business day. Your provider may request additional information or to have a follow-up visit with you prior to refilling your medication.   *Please Note: Medication refills are assigned a new Rx number when refilled electronically. Your pharmacy may indicate that no refills were authorized even though a new prescription for the same medication is available at the pharmacy. Please request the medicine by name with the pharmacy before contacting your provider for a refill.        Instructions    P:  1.  GBS @ 35 wks.          2.  Continue FKCs.          3.  Questions answered.          4.  Encouraged pt to tour L&D.          5.  Encourage adequate water intake.        6.  F/u 2 wks.        7.  FYI: none.         Other Notes About Your Plan     GIRL - Louise Bowden Status: Patient Declined

## 2017-05-19 NOTE — PROGRESS NOTES
OB f/u today  + fetal movement.  No VB, LOF or UC's.  Good phone # [phone]  Preferred pharmacy confirmed.   Pt c/o some pelvic pressure with FM

## 2017-05-19 NOTE — PROGRESS NOTES
S:  Pt is  at 32w4d for routine OB follow up.  Reports occ pelvic pressure with FM.  Reports good FM.  Denies VB, LOF, RUCs or vaginal DC.    O:  Please see above vitals.        FHTs: 155        Fundal ht: 32 cm.    A:  IUP at 32w4d  Patient Active Problem List    Diagnosis Date Noted   • Encounter for supervision of other normal pregnancy, second trimester 2017   • Elderly multigravida in second trimester 2017        P:  1.  GBS @ 35 wks.          2.  Continue FKCs.          3.  Questions answered.          4.  Encouraged pt to tour L&D.          5.  Encourage adequate water intake.        6.  F/u 2 wks.        7.  FYI: none.

## 2017-05-19 NOTE — PATIENT INSTRUCTIONS
P:  1.  GBS @ 35 wks.          2.  Continue FKCs.          3.  Questions answered.          4.  Encouraged pt to tour L&D.          5.  Encourage adequate water intake.        6.  F/u 2 wks.        7.  FYI: none.

## 2017-06-06 ENCOUNTER — ROUTINE PRENATAL (OUTPATIENT)
Dept: OBGYN | Facility: CLINIC | Age: 37
End: 2017-06-06

## 2017-06-06 ENCOUNTER — HOSPITAL ENCOUNTER (OUTPATIENT)
Facility: MEDICAL CENTER | Age: 37
End: 2017-06-06
Attending: NURSE PRACTITIONER
Payer: COMMERCIAL

## 2017-06-06 VITALS — SYSTOLIC BLOOD PRESSURE: 98 MMHG | WEIGHT: 173 LBS | BODY MASS INDEX: 27.94 KG/M2 | DIASTOLIC BLOOD PRESSURE: 60 MMHG

## 2017-06-06 DIAGNOSIS — O09.522 ELDERLY MULTIGRAVIDA IN SECOND TRIMESTER: ICD-10-CM

## 2017-06-06 DIAGNOSIS — Z34.82 ENCOUNTER FOR SUPERVISION OF OTHER NORMAL PREGNANCY, SECOND TRIMESTER: Primary | ICD-10-CM

## 2017-06-06 PROCEDURE — 90040 PR PRENATAL FOLLOW UP: CPT | Performed by: NURSE PRACTITIONER

## 2017-06-06 NOTE — PATIENT INSTRUCTIONS
P:  1.  GBS obtained.          2.  Labor precautions given.  Instructions given on where to go.  Pt receptive to              education.          3.  Questions answered.          4.  Continue FKCs.          5.  Encouraged adequate water intake        6.  F/u 1 wk.        7.  FYI:  none.          8.  D/w pt helps for back pain.

## 2017-06-06 NOTE — PROGRESS NOTES
S:  Pt is  at 35w1d here for routine OB follow up.  Reports some back pain and incr pelvic pressure.  Reports good FM.  Denies VB, LOF, RUCs, or vaginal DC.     O:  Please see above vitals.        FHTs: 156        Fundal ht: 36 cm.        Fetal position: vertex.    A:  IUP at 35w1d  Patient Active Problem List    Diagnosis Date Noted   • Encounter for supervision of other normal pregnancy, second trimester 2017   • Elderly multigravida in second trimester 2017       P:  1.  GBS obtained.          2.  Labor precautions given.  Instructions given on where to go.  Pt receptive to              education.          3.  Questions answered.          4.  Continue FKCs.          5.  Encouraged adequate water intake        6.  F/u 1 wk.        7.  FYI:  none.          8.  D/w pt helps for back pain.

## 2017-06-06 NOTE — PROGRESS NOTES
OB f/u. + fetal movement.  No VB, LOF or UC's.  Wt: 173lb      BP: 98/60  Good phone # [phone]  Preferred pharmacy confirmed.  GBS today

## 2017-06-06 NOTE — Clinical Note
June 6, 2017            Jaylene Deleon is pregnant with an estimated delivery date of 7/10/17 at this time.        Thank you,          PIA LeyvaNYESENIA.    Electronically Signed

## 2017-06-06 NOTE — Clinical Note
June 6, 2017            Jaylene Deleon is currently being cared for at [facility].  This patient is pregnant and may continue to work.  She should not lift greater than 20 pounds and requires frequent rest periods (10 minutes every two hours).        Thank you,          Davion Schreiber PA-C

## 2017-06-06 NOTE — MR AVS SNAPSHOT
Jaylene Bellosmin YovaniOneil   2017 3:00 PM   Routine Prenatal   MRN: [id number]    [facility]   Dept Phone:  [phone]    Description:  Female : 1980   Provider:  Fallon Pearson C.N.M.           Allergies as of 2017     Allergen Noted Reactions    Nkda [No Known Drug Allergy] 2010         You were diagnosed with     Encounter for supervision of other normal pregnancy, second trimester   [[id number]]  -  Primary     Elderly multigravida in second trimester   [[id number]]         Vital Signs     Blood Pressure Weight Smoking Status             98/60 mmHg 78.472 kg (173 lb) Never Smoker          Basic Information     Date Of Birth Sex Race Ethnicity Preferred Language    1980 Female  or   Origin (German,Malawian,Polish,Chris, etc) German      Problem List              ICD-10-CM Priority Class Noted - Resolved    Encounter for supervision of other normal pregnancy, second trimester Z34.82   2017 - Present    Elderly multigravida in second trimester O09.522   2017 - Present      Health Maintenance        Date Due Completion Dates    PAP SMEAR 2020    IMM DTaP/Tdap/Td Vaccine (3 - Td) 2027, 2010            Current Immunizations     Influenza TIV (IM) 2010  1:45 AM    Tdap Vaccine 2017  2:32 PM, 2010  1:45 AM      Below and/or attached are the medications your provider expects you to take. Review all of your home medications and newly ordered medications with your provider and/or pharmacist. Follow medication instructions as directed by your provider and/or pharmacist. Please keep your medication list with you and share with your provider. Update the information when medications are discontinued, doses are changed, or new medications (including over-the-counter products) are added; and carry medication information at all times in the event of emergency situations     Allergies:  NKDA -  (reactions not documented)               Medications  Valid as of: June 06, 2017 -  3:25 PM    Generic Name Brand Name Tablet Size Instructions for use    Prenatal MV-Min-Fe Fum-FA-DHA   Take  by mouth.        .                 Medicines prescribed today were sent to:     [facility] [address] [facility] ([place]), [place] - [address] [facility]    [address] [facility] ([place]) [place] [address]    Phone: [phone] Fax: [phone]    Open 24 Hours?: No      Medication refill instructions:       If your prescription bottle indicates you have medication refills left, it is not necessary to call your provider’s office. Please contact your pharmacy and they will refill your medication.    If your prescription bottle indicates you do not have any refills left, you may request refills at any time through one of the following ways: The online [employer] system (except Urgent Care), by calling your provider’s office, or by asking your pharmacy to contact your provider’s office with a refill request. Medication refills are processed only during regular business hours and may not be available until the next business day. Your provider may request additional information or to have a follow-up visit with you prior to refilling your medication.   *Please Note: Medication refills are assigned a new Rx number when refilled electronically. Your pharmacy may indicate that no refills were authorized even though a new prescription for the same medication is available at the pharmacy. Please request the medicine by name with the pharmacy before contacting your provider for a refill.        Your To Do List     Future Labs/Procedures Complete By Expires    GRP B STREP, BY PCR ([facility])  As directed 6/6/2018    Comments:    Source: vaginal/rectal      Instructions    P:  1.  GBS obtained.          2.  Labor precautions given.  Instructions given on where to go.  Pt receptive to              education.          3.  Questions answered.          4.  Continue FKCs.            5.  Encouraged adequate water intake        6.  F/u 1 wk.        7.  FYI:  none.          8.  D/w pt helps for back pain.         Other Notes About Your Plan     GIRL - Louise Bowden Access Code: Activation code not generated  Current [email or web address] Status: Active

## 2017-06-07 DIAGNOSIS — Z34.82 ENCOUNTER FOR SUPERVISION OF OTHER NORMAL PREGNANCY, SECOND TRIMESTER: ICD-10-CM

## 2017-06-08 LAB — GP B STREP DNA SPEC QL NAA+PROBE: NEGATIVE

## 2017-06-14 ENCOUNTER — ROUTINE PRENATAL (OUTPATIENT)
Dept: OBGYN | Facility: CLINIC | Age: 37
End: 2017-06-14

## 2017-06-14 VITALS — SYSTOLIC BLOOD PRESSURE: 110 MMHG | DIASTOLIC BLOOD PRESSURE: 58 MMHG | BODY MASS INDEX: 28.74 KG/M2 | WEIGHT: 178 LBS

## 2017-06-14 DIAGNOSIS — M54.50 CHRONIC BILATERAL LOW BACK PAIN WITHOUT SCIATICA: ICD-10-CM

## 2017-06-14 DIAGNOSIS — G89.29 CHRONIC BILATERAL LOW BACK PAIN WITHOUT SCIATICA: ICD-10-CM

## 2017-06-14 DIAGNOSIS — O09.522 ELDERLY MULTIGRAVIDA IN SECOND TRIMESTER: ICD-10-CM

## 2017-06-14 DIAGNOSIS — Z34.82 ENCOUNTER FOR SUPERVISION OF OTHER NORMAL PREGNANCY, SECOND TRIMESTER: ICD-10-CM

## 2017-06-14 PROCEDURE — 90040 PR PRENATAL FOLLOW UP: CPT | Performed by: NURSE PRACTITIONER

## 2017-06-14 NOTE — PROGRESS NOTES
Pt here today for OB follow up  Reports +FM  Dc/o back pain down to her foot for 3 years that stops her from having intercourse and is concerned there will be complications during delivery also states she went to a chiropractor for 4 months but she does not know if she was diagnosed with anything or if anything is wrong. Denies any other complaints  WT:178lb  BP:110/58  Good # [phone]

## 2017-06-14 NOTE — MR AVS SNAPSHOT
Jaylene Sykes YovaniOneil   2017 4:30 PM   Routine Prenatal   MRN: [id number]    [facility]   Dept Phone:  [phone]    Description:  Female : 1980   Provider:  Charmaine Navarrete C.N.M.           Allergies as of 2017     Allergen Noted Reactions    Nkda [No Known Drug Allergy] 2010         You were diagnosed with     Encounter for supervision of other normal pregnancy, second trimester   [[id number]]       Elderly multigravida in second trimester   [[id number]]         Vital Signs     Blood Pressure Weight Smoking Status             110/58 mmHg 80.74 kg (178 lb) Never Smoker          Basic Information     Date Of Birth Sex Race Ethnicity Preferred Language    1980 Female  or   Origin (Norwegian,Kazakh,Paraguayan,Brazilian, etc) Norwegian      Problem List              ICD-10-CM Priority Class Noted - Resolved    Encounter for supervision of other normal pregnancy, second trimester Z34.82 Medium  2017 - Present    Elderly multigravida in second trimester O09.522 Medium  2017 - Present      Health Maintenance        Date Due Completion Dates    PAP SMEAR 2020    IMM DTaP/Tdap/Td Vaccine (3 - Td) 2027, 2010            Current Immunizations     Influenza TIV (IM) 2010  1:45 AM    Tdap Vaccine 2017  2:32 PM, 2010  1:45 AM      Below and/or attached are the medications your provider expects you to take. Review all of your home medications and newly ordered medications with your provider and/or pharmacist. Follow medication instructions as directed by your provider and/or pharmacist. Please keep your medication list with you and share with your provider. Update the information when medications are discontinued, doses are changed, or new medications (including over-the-counter products) are added; and carry medication information at all times in the event of emergency situations     Allergies:  NKDA -  (reactions not documented)               Medications  Valid as of: June 14, 2017 -  4:56 PM    Generic Name Brand Name Tablet Size Instructions for use    Prenatal MV-Min-Fe Fum-FA-DHA   Take  by mouth.        .                 Medicines prescribed today were sent to:     [facility] ([place]), [place] - [address] [facility]    [address] [facility] ([place]) [place] [address]    Phone: [phone] Fax: [phone]    Open 24 Hours?: No      Medication refill instructions:       If your prescription bottle indicates you have medication refills left, it is not necessary to call your provider’s office. Please contact your pharmacy and they will refill your medication.    If your prescription bottle indicates you do not have any refills left, you may request refills at any time through one of the following ways: The online [employer] system (except Urgent Care), by calling your provider’s office, or by asking your pharmacy to contact your provider’s office with a refill request. Medication refills are processed only during regular business hours and may not be available until the next business day. Your provider may request additional information or to have a follow-up visit with you prior to refilling your medication.   *Please Note: Medication refills are assigned a new Rx number when refilled electronically. Your pharmacy may indicate that no refills were authorized even though a new prescription for the same medication is available at the pharmacy. Please request the medicine by name with the pharmacy before contacting your provider for a refill.        Other Notes About Your Plan     PNL WNL, AFP WNL, , GBS Neg, TDAP done, No BTL, US WNL- GIRL - Louise           MyChart Access Code: Activation code not generated  Current [employer] Status: Active

## 2017-06-14 NOTE — PROGRESS NOTES
S) Pt is a 36 y.o.   at 36w2d  gestation. Routine prenatal care today. Complains of back pain from an accident 2 year ago. Wanted to know if her back pain would cause any problems during labor. All questions answered. There is no diagnosis in her chart stating any anatomical anomalies or problems.    Fetal movement Normal  Cramping no,  VB no  LOF no   Denies dysuria. Generally feels well today. Good self-care activities identified. Denies headaches, swelling, visual changes, or epigastric pain .     O) Blood pressure 110/58, weight 80.74 kg (178 lb).        Labs:       PNL: WNL       GCT: 137       AFP: Normal       GBS: negative       Pertinent ultrasound -        3/1/17- WNL    A) IUP at 36w2d       S=D         Patient Active Problem List    Diagnosis Date Noted   • Encounter for supervision of other normal pregnancy, second trimester 2017     Priority: Medium   • Elderly multigravida in second trimester 2017     Priority: Medium                 TDAP: yes       BTL: no       : n/a    P) s/s ptl vs general discomforts. Fetal movements reviewed. General ed and anticipatory guidance. Nutrition/exercise/vitamin. Plans breast Plans pp contraception- unsure  Continue PNV.

## 2017-06-23 ENCOUNTER — ROUTINE PRENATAL (OUTPATIENT)
Dept: OBGYN | Facility: CLINIC | Age: 37
End: 2017-06-23

## 2017-06-23 VITALS — SYSTOLIC BLOOD PRESSURE: 112 MMHG | WEIGHT: 173 LBS | DIASTOLIC BLOOD PRESSURE: 64 MMHG | BODY MASS INDEX: 27.94 KG/M2

## 2017-06-23 DIAGNOSIS — O09.522 ELDERLY MULTIGRAVIDA IN SECOND TRIMESTER: ICD-10-CM

## 2017-06-23 DIAGNOSIS — Z34.82 ENCOUNTER FOR SUPERVISION OF OTHER NORMAL PREGNANCY, SECOND TRIMESTER: ICD-10-CM

## 2017-06-23 PROCEDURE — 90040 PR PRENATAL FOLLOW UP: CPT | Performed by: PHYSICIAN ASSISTANT

## 2017-06-23 NOTE — PROGRESS NOTES
Pt has no complaints with cramping, UCs, VB, LOF. +FM. Daily FKC and walks recommended. Labor precautions reviewed. GBS neg - pt notified of results. Pt does have pain in upper abd, often where baby is kicking - pt to try warma nd cool packs prn, also swimming. RTC 1 wk or sooner prn.

## 2017-06-23 NOTE — MR AVS SNAPSHOT
Jaylene Bellosmin YovaniOneil   2017 4:00 PM   Routine Prenatal   MRN: [id number]    [facility]   Dept Phone:  [phone]    Description:  Female : 1980   Provider:  AGUSTIN Santana           Allergies as of 2017     Allergen Noted Reactions    Nkda [No Known Drug Allergy] 2010         You were diagnosed with     Encounter for supervision of other normal pregnancy, second trimester   [7606432]       Elderly multigravida in second trimester   [2042036]         Vital Signs     Blood Pressure Weight Smoking Status             112/64 mmHg 78.472 kg (173 lb) Never Smoker          Basic Information     Date Of Birth Sex Race Ethnicity Preferred Language    1980 Female  or   Origin (Slovak,Slovak,Palauan,Indian, etc) Slovak      Problem List              ICD-10-CM Priority Class Noted - Resolved    Encounter for supervision of other normal pregnancy, second trimester Z34.82 Medium  2017 - Present    Elderly multigravida in second trimester O09.522 Medium  2017 - Present    Chronic bilateral low back pain without sciatica M54.5, G89.29 Low  2017 - Present      Health Maintenance        Date Due Completion Dates    PAP SMEAR 2020    IMM DTaP/Tdap/Td Vaccine (3 - Td) 2027, 2010            Current Immunizations     Influenza TIV (IM) 2010  1:45 AM    Tdap Vaccine 2017  2:32 PM, 2010  1:45 AM      Below and/or attached are the medications your provider expects you to take. Review all of your home medications and newly ordered medications with your provider and/or pharmacist. Follow medication instructions as directed by your provider and/or pharmacist. Please keep your medication list with you and share with your provider. Update the information when medications are discontinued, doses are changed, or new medications (including over-the-counter products) are added; and carry  medication information at all times in the event of emergency situations     Allergies:  NKDA - (reactions not documented)               Medications  Valid as of: June 23, 2017 -  4:26 PM    Generic Name Brand Name Tablet Size Instructions for use    Prenatal MV-Min-Fe Fum-FA-DHA   Take  by mouth.        .                 Medicines prescribed today were sent to:     [facility] [address] [place] [address] (S), [place] - [address]    [address] [facility] (S) [place] [address]    Phone: [phone] Fax: [phone]    Open 24 Hours?: No      Medication refill instructions:       If your prescription bottle indicates you have medication refills left, it is not necessary to call your provider’s office. Please contact your pharmacy and they will refill your medication.    If your prescription bottle indicates you do not have any refills left, you may request refills at any time through one of the following ways: The online [employer] system (except Urgent Care), by calling your provider’s office, or by asking your pharmacy to contact your provider’s office with a refill request. Medication refills are processed only during regular business hours and may not be available until the next business day. Your provider may request additional information or to have a follow-up visit with you prior to refilling your medication.   *Please Note: Medication refills are assigned a new Rx number when refilled electronically. Your pharmacy may indicate that no refills were authorized even though a new prescription for the same medication is available at the pharmacy. Please request the medicine by name with the pharmacy before contacting your provider for a refill.        Other Notes About Your Plan     PNL WNL, AFP WNL, , GBS Neg, TDAP done, No BTL, US WNL- GIRL - Louise           MyChart Access Code: Activation code not generated  Current [employer] Status: Active

## 2017-06-28 ENCOUNTER — ROUTINE PRENATAL (OUTPATIENT)
Dept: OBGYN | Facility: CLINIC | Age: 37
End: 2017-06-28

## 2017-06-28 VITALS — DIASTOLIC BLOOD PRESSURE: 64 MMHG | BODY MASS INDEX: 27.94 KG/M2 | SYSTOLIC BLOOD PRESSURE: 108 MMHG | WEIGHT: 173 LBS

## 2017-06-28 DIAGNOSIS — O09.522 ELDERLY MULTIGRAVIDA IN SECOND TRIMESTER: ICD-10-CM

## 2017-06-28 DIAGNOSIS — R30.0 DYSURIA: ICD-10-CM

## 2017-06-28 DIAGNOSIS — Z34.82 ENCOUNTER FOR SUPERVISION OF OTHER NORMAL PREGNANCY, SECOND TRIMESTER: ICD-10-CM

## 2017-06-28 LAB
APPEARANCE UR: NORMAL
BILIRUB UR STRIP-MCNC: NORMAL MG/DL
COLOR UR AUTO: NORMAL
GLUCOSE UR STRIP.AUTO-MCNC: NEGATIVE MG/DL
KETONES UR STRIP.AUTO-MCNC: NEGATIVE MG/DL
LEUKOCYTE ESTERASE UR QL STRIP.AUTO: NORMAL
NITRITE UR QL STRIP.AUTO: NEGATIVE
PH UR STRIP.AUTO: 5 [PH] (ref 5–8)
PROT UR QL STRIP: NEGATIVE MG/DL
RBC UR QL AUTO: NEGATIVE
SP GR UR STRIP.AUTO: 1
UROBILINOGEN UR STRIP-MCNC: NORMAL MG/DL

## 2017-06-28 PROCEDURE — 81002 URINALYSIS NONAUTO W/O SCOPE: CPT | Performed by: NURSE PRACTITIONER

## 2017-06-28 PROCEDURE — 90040 PR PRENATAL FOLLOW UP: CPT | Performed by: NURSE PRACTITIONER

## 2017-06-28 NOTE — PROGRESS NOTES
Pt. Here for OB/FU today. Reports Good FM.   Good # [phone]  Pt states having a mucus discharge, and pain with urination.   Pharmacy verified.

## 2017-06-28 NOTE — MR AVS SNAPSHOT
Jaylene Mony Deleon   2017 3:30 PM   Routine Prenatal   MRN: [id number]    [facility]   Dept Phone:  [phone]    Description:  Female : 1980   Provider:  RAYNE Garrett           Allergies as of 2017     Allergen Noted Reactions    Nkda [No Known Drug Allergy] 2010         You were diagnosed with     Encounter for supervision of other normal pregnancy, second trimester   [8882151]       Elderly multigravida in second trimester   [4591482]       Dysuria   [788.1.ICD-9-CM]         Vital Signs     Blood Pressure Weight Smoking Status             108/64 mmHg 78.472 kg (173 lb) Never Smoker          Basic Information     Date Of Birth Sex Race Ethnicity Preferred Language    1980 Female  or   Origin (Georgian,Indonesian,Malaysian,Chris, etc) Georgian      Problem List              ICD-10-CM Priority Class Noted - Resolved    Encounter for supervision of other normal pregnancy, second trimester Z34.82 Medium  2017 - Present    Elderly multigravida in second trimester O09.522 Medium  2017 - Present    Chronic bilateral low back pain without sciatica M54.5, G89.29 Low  2017 - Present      Health Maintenance        Date Due Completion Dates    PAP SMEAR 2020    IMM DTaP/Tdap/Td Vaccine (3 - Td) 2027, 2010            Results     POCT Urinalysis      Component Value Standard Range & Units    POC Color  Negative    POC Appearance  Negative    POC Leukocyte Esterase trace Negative    POC Nitrites negative Negative    POC Urobiligen  Negative (0.2) mg/dL    POC Protein negative Negative mg/dL    POC Urine PH 5.0 5.0 - 8.0    POC Blood negative Negative    POC Specific Gravity 1.005 <1.005 - >1.030    POC Ketones negative Negative mg/dL    POC Biliruben  Negative mg/dL    POC Glucose negative Negative mg/dL                        Current Immunizations     Influenza TIV (IM) 2010  1:45 AM     Tdap Vaccine 4/20/2017  2:32 PM, 2/22/2010  1:45 AM      Below and/or attached are the medications your provider expects you to take. Review all of your home medications and newly ordered medications with your provider and/or pharmacist. Follow medication instructions as directed by your provider and/or pharmacist. Please keep your medication list with you and share with your provider. Update the information when medications are discontinued, doses are changed, or new medications (including over-the-counter products) are added; and carry medication information at all times in the event of emergency situations     Allergies:  NKDA - (reactions not documented)               Medications  Valid as of: June 28, 2017 -  3:58 PM    Generic Name Brand Name Tablet Size Instructions for use    Prenatal MV-Min-Fe Fum-FA-DHA   Take  by mouth.        .                 Medicines prescribed today were sent to:     [facility] [address] [place] [address] (S), [place] - [address]    [facility] (S) [place] [address]    Phone: [phone] Fax: [phone]    Open 24 Hours?: No      Medication refill instructions:       If your prescription bottle indicates you have medication refills left, it is not necessary to call your provider’s office. Please contact your pharmacy and they will refill your medication.    If your prescription bottle indicates you do not have any refills left, you may request refills at any time through one of the following ways: The online [employer] system (except Urgent Care), by calling your provider’s office, or by asking your pharmacy to contact your provider’s office with a refill request. Medication refills are processed only during regular business hours and may not be available until the next business day. Your provider may request additional information or to have a follow-up visit with you prior to refilling your medication.   *Please Note: Medication refills are assigned a new Rx number when refilled  electronically. Your pharmacy may indicate that no refills were authorized even though a new prescription for the same medication is available at the pharmacy. Please request the medicine by name with the pharmacy before contacting your provider for a refill.        Other Notes About Your Plan     PNL WNL, AFP WNL, , GBS Neg, TDAP done, No BTL, US WNL- GIRL - Louise           MyChart Access Code: Activation code not generated  Current MyChart Status: Active

## 2017-07-06 ENCOUNTER — ROUTINE PRENATAL (OUTPATIENT)
Dept: OBGYN | Facility: CLINIC | Age: 37
End: 2017-07-06

## 2017-07-06 VITALS — WEIGHT: 175 LBS | DIASTOLIC BLOOD PRESSURE: 60 MMHG | BODY MASS INDEX: 28.26 KG/M2 | SYSTOLIC BLOOD PRESSURE: 120 MMHG

## 2017-07-06 DIAGNOSIS — O09.522 ELDERLY MULTIGRAVIDA IN SECOND TRIMESTER: ICD-10-CM

## 2017-07-06 DIAGNOSIS — Z34.82 ENCOUNTER FOR SUPERVISION OF OTHER NORMAL PREGNANCY, SECOND TRIMESTER: Primary | ICD-10-CM

## 2017-07-06 PROCEDURE — 90040 PR PRENATAL FOLLOW UP: CPT | Performed by: NURSE PRACTITIONER

## 2017-07-06 NOTE — PROGRESS NOTES
S:  Pt is  at 39w3d here for routine OB follow up.  No c/o.  Reports good FM.  Denies VB, LOF, RUCs, or vaginal DC.     O:  Please see above vitals.        FHTs: 140        Fundal ht: 38        Fetal position: vertex, confirmed via BSUS        SVE: /-2        GBS neg on 17 -- reviewed w pt.      A:  IUP at 39w3d  Patient Active Problem List    Diagnosis Date Noted   • Encounter for supervision of other normal pregnancy, second trimester 2017     Priority: Medium   • Elderly multigravida in second trimester 2017     Priority: Medium   • Chronic bilateral low back pain without sciatica 2017     Priority: Low       P:  1.  Continue FKCs.         2.  Labor precautions given.  Instructions given on where to go.  Pt receptive to education.         3.  D/w pt IOL policy.  IOL scheduled 7/10/17 @ 8pm. Pt requested me to attend delivery.        4.  Questions answered.         5.  Encouraged adequate water intake       6.  F/u PP       7.  FYI: none.

## 2017-07-06 NOTE — PATIENT INSTRUCTIONS
P:  1.  Continue FKCs.         2.  Labor precautions given.  Instructions given on where to go.  Pt receptive to education.         3.  D/w pt IOL policy.  IOL scheduled 7/10/17 @ 8pm. Pt requested me to attend delivery.        4.  Questions answered.         5.  Encouraged adequate water intake       6.  F/u PP       7.  FYI: none.

## 2017-07-06 NOTE — PROGRESS NOTES
OB f/u. + fetal movement.  No VB, LOF or UC's.  Wt: 175lb     BP: 120/60  Good phone # [phone]  Preferred pharmacy confirmed.

## 2017-07-06 NOTE — MR AVS SNAPSHOT
Jaylene Mony Deleon   2017 3:45 PM   Routine Prenatal   MRN: [id number]    [facility]   Dept Phone:  [phone]    Description:  Female : 1980   Provider:  Fallon Pearson C.N.M.           Allergies as of 2017     Allergen Noted Reactions    Nkda [No Known Drug Allergy] 2010         You were diagnosed with     Encounter for supervision of other normal pregnancy, second trimester   [[id number]]       Elderly multigravida in second trimester   [6849694]         Vital Signs     Blood Pressure Weight Smoking Status             120/60 mmHg 79.379 kg (175 lb) Never Smoker          Basic Information     Date Of Birth Sex Race Ethnicity Preferred Language    1980 Female  or   Origin (Cape Verdean,Equatorial Guinean,Palauan,Chris, etc) [place] Verdean      Your appointments     Jul 10, 2017  8:00 PM   [facility] INDUCTION OF LABOR with NON-SURGICAL L&D   LABOR & DELIVERY [facility] (--)    [address] [place] [id number] [phone]              Problem List              ICD-10-CM Priority Class Noted - Resolved    Encounter for supervision of other normal pregnancy, second trimester Z34.82 Medium  2017 - Present    Elderly multigravida in second trimester O09.522 Medium  2017 - Present    Chronic bilateral low back pain without sciatica M54.5, G89.29 Low  2017 - Present      Health Maintenance        Date Due Completion Dates    IMM INFLUENZA (1) 2017    PAP SMEAR 2020    IMM DTaP/Tdap/Td Vaccine (3 - Td) 2027, 2010            Current Immunizations     Influenza TIV (IM) 2010  1:45 AM    Tdap Vaccine 2017  2:32 PM, 2010  1:45 AM      Below and/or attached are the medications your provider expects you to take. Review all of your home medications and newly ordered medications with your provider and/or pharmacist. Follow medication instructions as directed by your provider and/or pharmacist.  Please keep your medication list with you and share with your provider. Update the information when medications are discontinued, doses are changed, or new medications (including over-the-counter products) are added; and carry medication information at all times in the event of emergency situations     Allergies:  NKDA - (reactions not documented)               Medications  Valid as of: July 06, 2017 -  4:18 PM    Generic Name Brand Name Tablet Size Instructions for use    Prenatal MV-Min-Fe Fum-FA-DHA   Take  by mouth.        .                 Medicines prescribed today were sent to:     [address] (S), [place] - [address]    [facility] [address] [employer] ([place]) [place] [address]    Phone: [phone] Fax: [phone]    Open 24 Hours?: No      Medication refill instructions:       If your prescription bottle indicates you have medication refills left, it is not necessary to call your provider’s office. Please contact your pharmacy and they will refill your medication.    If your prescription bottle indicates you do not have any refills left, you may request refills at any time through one of the following ways: The online [employer] system (except Urgent Care), by calling your provider’s office, or by asking your pharmacy to contact your provider’s office with a refill request. Medication refills are processed only during regular business hours and may not be available until the next business day. Your provider may request additional information or to have a follow-up visit with you prior to refilling your medication.   *Please Note: Medication refills are assigned a new Rx number when refilled electronically. Your pharmacy may indicate that no refills were authorized even though a new prescription for the same medication is available at the pharmacy. Please request the medicine by name with the pharmacy before contacting your provider for a refill.        Other Notes About Your Plan     PNL WNL, AFP WNL, GCT  137, GBS Neg, TDAP done, No BTL, US WNL- GIRL - Louise           MyChart Access Code: Activation code not generated  Current MyChart Status: Active

## 2017-08-11 ENCOUNTER — POST PARTUM (OUTPATIENT)
Dept: OBGYN | Facility: CLINIC | Age: 37
End: 2017-08-11

## 2017-08-11 VITALS — WEIGHT: 158 LBS | DIASTOLIC BLOOD PRESSURE: 64 MMHG | SYSTOLIC BLOOD PRESSURE: 118 MMHG | BODY MASS INDEX: 25.51 KG/M2

## 2017-08-11 DIAGNOSIS — Z30.013 ENCOUNTER FOR INITIAL PRESCRIPTION OF INJECTABLE CONTRACEPTIVE: ICD-10-CM

## 2017-08-11 PROBLEM — Z34.82 ENCOUNTER FOR SUPERVISION OF OTHER NORMAL PREGNANCY, SECOND TRIMESTER: Status: RESOLVED | Noted: 2017-01-05 | Resolved: 2017-08-11

## 2017-08-11 PROBLEM — O09.522 ELDERLY MULTIGRAVIDA IN SECOND TRIMESTER: Status: RESOLVED | Noted: 2017-01-05 | Resolved: 2017-08-11

## 2017-08-11 PROCEDURE — 90050 PR POSTPARTUM VISIT: CPT | Performed by: NURSE PRACTITIONER

## 2017-08-11 RX ORDER — MEDROXYPROGESTERONE ACETATE 150 MG/ML
150 INJECTION, SUSPENSION INTRAMUSCULAR ONCE
Qty: 1 VIAL | Refills: 4 | Status: SHIPPED | OUTPATIENT
Start: 2017-08-11 | End: 2017-08-11

## 2017-08-11 ASSESSMENT — ENCOUNTER SYMPTOMS
CONSTITUTIONAL NEGATIVE: 1
PSYCHIATRIC NEGATIVE: 1
DEPRESSION: 0
NEUROLOGICAL NEGATIVE: 1
RESPIRATORY NEGATIVE: 1
EYES NEGATIVE: 1
MUSCULOSKELETAL NEGATIVE: 1
CARDIOVASCULAR NEGATIVE: 1
GASTROINTESTINAL NEGATIVE: 1

## 2017-08-11 NOTE — MR AVS SNAPSHOT
Jaylene Bellosmin Adrienne   2017 2:30 PM   Post Partum   MRN: [id number]    Department[facility]   Dept Phone:  [phone]    Description:  Female : 1980   Provider:  RAYNE Barreto           Allergies as of 2017     Allergen Noted Reactions    Nkda [No Known Drug Allergy] 2010         You were diagnosed with     Postpartum care and examination of lactating mother   [V24.1.ICD-9-CM]       Encounter for initial prescription of injectable contraceptive   [097490]         Vital Signs     Blood Pressure Weight Breastfeeding? Smoking Status          118/64 mmHg 71.668 kg (158 lb) Unknown Never Smoker         Basic Information     Date Of Birth Sex Race Ethnicity Preferred Language    1980 Female  or   Origin (Taiwanese,Marshallese,Maltese,Chris, etc) Taiwanese      Your appointments     Aug 14, 2017  1:00 PM   Nurse Visit with [facility] NURSE   [facility] ([facility])    [address] [place] [address] [id number] [phone]              Problem List              ICD-10-CM Priority Class Noted - Resolved    Chronic bilateral low back pain without sciatica M54.5, G89.29 Low  2017 - Present      Health Maintenance        Date Due Completion Dates    IMM INFLUENZA (1) 2017    PAP SMEAR 2020    IMM DTaP/Tdap/Td Vaccine (3 - Td) 2027, 2010            Current Immunizations     Influenza TIV (IM) 2010  1:45 AM    Tdap Vaccine 2017  2:32 PM, 2010  1:45 AM      Below and/or attached are the medications your provider expects you to take. Review all of your home medications and newly ordered medications with your provider and/or pharmacist. Follow medication instructions as directed by your provider and/or pharmacist. Please keep your medication list with you and share with your provider. Update the information when medications are discontinued, doses are changed, or new medications (including  over-the-counter products) are added; and carry medication information at all times in the event of emergency situations     Allergies:  NKDA - (reactions not documented)               Medications  Valid as of: August 11, 2017 -  3:08 PM    Generic Name Brand Name Tablet Size Instructions for use    Ibuprofen (Tab) MOTRIN 800 MG Take 1 Tab by mouth every 6 hours as needed (cramping after delivery; do not give if patient is receiving ketorolac (Toradol)).        MedroxyPROGESTERone Acetate (Suspension) DEPO-PROVERA 150 MG/ML 1 mL by Intramuscular route Once for 1 dose.        Prenatal MV-Min-Fe Fum-FA-DHA   Take  by mouth.        .                 Medicines prescribed today were sent to:     [facility] [address] [place] [address] (S), [place] - [id number]    [facility] [place] [address]    Phone: [phone] Fax: [phone]    Open 24 Hours?: No      Medication refill instructions:       If your prescription bottle indicates you have medication refills left, it is not necessary to call your provider’s office. Please contact your pharmacy and they will refill your medication.    If your prescription bottle indicates you do not have any refills left, you may request refills at any time through one of the following ways: The online [employer] system (except Urgent Care), by calling your provider’s office, or by asking your pharmacy to contact your provider’s office with a refill request. Medication refills are processed only during regular business hours and may not be available until the next business day. Your provider may request additional information or to have a follow-up visit with you prior to refilling your medication.   *Please Note: Medication refills are assigned a new Rx number when refilled electronically. Your pharmacy may indicate that no refills were authorized even though a new prescription for the same medication is available at the pharmacy. Please request the medicine by name with the pharmacy  before contacting your provider for a refill.        Instructions    Increase breast milk: mother's milk tea  Root beer, Drink more water!!!!    Lactancia materna y lactancia inducida  (Breastfeeding and Inducing Lactation)  La lactancia inducida es el uso de hormonas u otros medicamentos y de la estimulación mamaria para ayudar a producir leche materna. Es aconsejable que pruebe la lactancia inducida si:  · Adopta un bebé.  · Tessa madre sustituta está embarazada de roberts bebé.  · Debe dejar de amamantar por un lapso de tiempo.  ¿CÓMO ACTÚA?  Jailene el embarazo, las hormonas se modifican para preparar al organismo para que produzca leche materna. Después del embarazo, las hormonas envían señales al organismo para que comience a producir leche materna para alimentar al bebé. Si usted no atraviesa por estos cambios, puede resultarle difícil producir la cantidad suficiente de leche materna para alimentar al bebé. El médico y el asesor en lactancia pueden ayudarla a que produzca leche con medicamentos y con estimulación mamaria.   ¿PRODUCIRÉ LA CANTIDAD DE LECHE SUFICIENTE PARA ALIMENTAR AL BEBÉ?  La lactancia inducida puede resultar satisfactoria. Sin embargo, muy pocas mujeres que usaron la lactancia inducida para producir leche materna pueden producir toda la leche que nilam bebés necesitan. [place] vez deba alimentar al bebé con leche materna donada o con leche maternizada, además de la leche materna. [place] garantizará que el bebé reciba la alimentación adecuada. Generalmente, la lactancia inducida es más satisfactoria si ha estado embarazada antes.   ¿DE QUÉ FORMA EL CUERPO PRODUCE LECHE?  Para inducir la lactancia, comenzará a coleen hormonas 3 o 4 meses antes de empezar a amamantar. Seguirá tomándolas hasta unas 6 semanas antes de la llegada del bebé. Cuando deje de coleen las hormonas, tendrá que estimularse las mamas varias veces por día para alentar la producción de leche materna. Para realizar la estimulación mamaria,  puede frotarse y estirarse el tejido del pezón suavemente. La estimulación mamaria también puede hacerse con maryam bomba mamaria eléctrica doble de hospital, para imitar la succión de un bebé. Intente extraerse leche de ambas mamas cada 3 horas (8 veces por día) shannan 20 minutos. Si no puede hacerlo tantas veces por día, hágalo con la mayor frecuencia posible. [place] ayuda a que el organismo siga produciendo leche. Cuando ponga al bebé en la mama, es posible que el organismo también responda al olor, el poncho y la sensación del bebé al aumentar la cantidad de leche que usted produce.   El médico también puede recetarle medicamentos para estimular la lactancia y aumentar el suministro de leche. Además, hay medicamentos herbales disponibles para inducir la lactancia. Es importante saber que estos medicamentos no cuentan con la aprobación de la FDA ni están regulados por greg organismo. Consulte siempre al médico antes de coleen algún medicamento herbal.  ¿QUÉ MÁS UZMA SABER?  · Solo debe coleen las hormonas y los medicamentos kaleigh le indicó el médico o el asesor capacitado en lactancia.  · Hable con el médico o el asesor en lactancia si necesita orientación. Ellos podrán ayudarla a que empiece a producir leche y la aconsejarán cuando tome decisiones importantes sobre cómo alimentar al bebé.  · Intente utilizar un sistema de alimentación suplementaria para proporcionarle al bebé leche materna donada o leche maternizada extra en la mama mientras se alimenta. [place] garantiza que el bebé reciba la nutrición adecuada mientras lo está amamantando. Consulte a un especialista en lactancia para que la ayude a encontrar y utilizar greg dispositivo.     Esta información no tiene kaleigh fin reemplazar el consejo del médico. Asegúrese de hacerle al médico cualquier pregunta que tenga.     Document Released: 06/05/2009 Document Revised: 12/23/2014  ElseSingWho Interactive Patient Education ©2016 [employer].  Medroxyprogesterone injection  [Contraceptive]  ¿Qué es greg medicamento?  Las inyecciones anticonceptivas de MEDROXIPROGESTERONA previenen el embarazo. Las inyecciones le brindarán control de la natalidad shannan 3 meses. La Depo-subQ Provera 104 se utiliza también para tratar el dolor relacionado con endometriosis.  Greg medicamento puede ser utilizado para otros usos; si tiene alguna pregunta consulte con roberts proveedor de atención médica o con roberts farmacéutico.  MARCAS COMERCIALES DISPONIBLES: Depo-Provera, Depo-subQ Provera 104  ¿Qué le elie informar a mi profesional de la idania antes de coleen greg medicamento?  Necesita saber si usted presenta alguno de los siguientes problemas o situaciones:  -si consume alcohol con frecuencia  -asma  -enfermedad vascular o antecedente de coágulos sanguíneos en los pulmones o las piernas  -enfermedad de los huesos, kaleigh osteoporosis  -cáncer de mama  -diabetes  -trastornos de la alimentación (anorexia nerviosa o bulimia)  -bharat presión sanguínea  -infecciones por VIH o SIDA  -enfermedad renal  -enfermedad hepática  -depresión mental  -migraña  -convulsiones  -derrame cerebral  -fuma tabaco  -sangrado vaginal  -maryam reacción alérgica o inusual a la medroxiprogesterona, otras hormonas, otros medicamentos, alimentos, colorantes o conservantes  -si está embarazada o buscando quedar embarazada  -si está amamantando a un bebé  ¿Cómo elie utilizar greg medicamento?  El anticonceptivo de Depo-Provera se inyecta por vía intramuscular. La Depo-SubQ Provera 104 se inyecta por vía subcutánea. Las administra un profesional de la idania. Usted no puede estar embarazada antes de recibir maryam inyección. La inyección normalmente se aplica shannan los primeros 5 días después de comenzar un período menstrual o 6 semanas después de un parto.  Hable con roberts pediatra para informarse acerca del uso de greg medicamento en niños. Puede requerir atención especial. Estas inyecciones banegas sido usadas en niñas que banegas empezados a tener  períodos menstruales.  Sobredosis: Póngase en contacto inmediatamente con un centro toxicológico o maryam hortencia de urgencia si usted galdino que haya tomado demasiado medicamento.  ATENCIÓN: Greg medicamento es solo para usted. No comparta greg medicamento con nadie.  ¿Qué sucede si me olvido de maryam dosis?  Trate de no olvidar ninguna dosis. Para mantener el control de natalidad necesitará maryam inyección cada 3 meses. Si no puede asistir a maryam yamini, comuníquese con roberts profesional de la idania para que se la cambie. Si espera más de 13 semanas entre las inyecciones anticonceptivos de Depo-Provera o más de 14 semanas entre inyecciones anticonceptivos de Depo-subQ Provera 104, puede quedarse embarazada. Si no puede asistir a roberts yamini utilice otro método anticonceptivo. [place] vez deba hacerse maryam prueba de embarazo antes de recibir otra inyección.  ¿Qué puede interactuar con greg medicamento?  No tome esta medicina con ninguno de los siguientes medicamentos:  -bosentano  Esta medicina también puede interactuar con los siguientes medicamentos:  -aminoglutethimide  -antibióticos o medicamentos para infecciones, especialmente rifampicina, rifabutina, rifapentina y griseofulvina  -aprepitant  -barbitúricos, tales kaleigh el fenobarbital o primidona  -bexaroteno  -carbamazepina  -medicamentos para convulsiones, tales kaleigh etotoína, felbamato, oxcarbazepina, fenitoína, topiramato  -modafinilo  -hierba de [place]  Puede ser que esta lista no menciona todas las posibles interacciones. Informe a roberts profesional de la idania de todos los productos a base de hierbas, medicamentos de venta aris o suplementos nutritivos que esté tomando. Si usted fuma, consume bebidas alcohólicas o si utiliza drogas ilegales, indíqueselo también a roberts profesional de la idania. Algunas sustancias pueden interactuar con roberts medicamento.  ¿A qué elie estar atento al usar greg medicamento?  Greg medicamento no la protege de la infección por VIH (SIDA) ni de otras  enfermedades de transmisión sexual.  El uso de greg producto puede provocar maryam pérdida de calcio de nliam huesos. La pérdida de calcio puede provocar huesos débiles (osteoporosis). Sólo use greg producto shannan más de 2 años si otras formas de anticonceptivos no son apropiados para usted. Mientre más tiempo use greg producto para el control de la natalidad, tendrá más riesgo de padecer de huesos débiles. Consulte a roberts profesional de la idania acerca de cómo puede mantener los huesos pedro luis.  Puede experimentar un cambio en el patrón de sangrado o periodos irregulares. Muchas mujeres meagan de tener periodos mientras usan greg medicamento.  Si recibe nilam inyecciones a tiempo, la posibilidad de quedarse embarazada es muy baja. Si galdino que podrá estar embarazada, visite a roberts profesional de la idania lo antes posible.  Si desea quedar embarazada dentro del próximo año, informe a roberts profesional de la idania. El efecto de greg medicamento puede perdurar shannan mucho tiempo después de recibir roberts última inyección.  ¿Qué efectos secundarios puedo tener al utilizar greg medicamento?  Efectos secundarios que debe informar a roberts médico o a roberts profesional de la idania tan pronto kaleigh sea posible:  -reacciones alérgicas kaleigh erupción cutánea, picazón o urticarias, hinchazón de la dion, labios o lengua  -secreciones o sensibilidad de las mamas  -problemas respiratorios  -cambios en la visión  -depresión  -sensación de desmayos o mareos, caídas  -fiebre  -dolor en el abdomen, pecho, entrepierna o piernas  -problemas de coordinación, del habla, al caminar  -cansancio o debilidad inusual  -color amarillento de los ojos o la piel  Efectos secundarios que, por lo general, no requieren atención médica (debe informarlos a médico o a roberts profesional de la idania si persisten o si son molestos):  -ácne  -retención de líquidos e hinchazón  -dolor de bonifacio  -períodos menstruales irregulares, manchando o ausencia de períodos menstruales  -dolor,  picazón o reacción cutánea temporal en el lugar de la inyección  -aumento de peso  Puede ser que esta lista no menciona todos los posibles efectos secundarios. Comuníquese a roberts médico por asesoramiento médico sobre los efectos secundarios. Usted puede informar los efectos secundarios a la FDA por teléfono al 1-800-FDA-2022.  ¿Dónde elie guardar mi medicina?  No se aplica en greg osmel. Un profesional de la idania le administrará las inyecciones.  ATENCIÓN: Greg folleto es un resumen. Puede ser que no cubra toda la posible información. Si usted tiene preguntas acerca de esta medicina, consulte con roberts médico, roberts farmacéutico o roberts profesional de la idania.  © 2014, [employer]vier[employer]. (3/1/2010 3:09:00 PM)         Other Notes About Your Plan     PNL WNL, AFP WNL, , GBS Neg, TDAP done, No BTL, US WNL- GIRL - Louise           MyChart Access Code: Activation code not generated  Current MyChart Status: Active

## 2017-08-11 NOTE — PROGRESS NOTES
Postpartum Visit  Last pap-1/5/2017 WNL  Pt is bottle and breastfeeding.   Pt is birth control pills or Depo.  Good phone lescpv-[phone]

## 2017-08-11 NOTE — PATIENT INSTRUCTIONS
Increase breast milk: mother's milk tea  Root beer, Drink more water!!!!    Lactancia materna y lactancia inducida  (Breastfeeding and Inducing Lactation)  La lactancia inducida es el uso de hormonas u otros medicamentos y de la estimulación mamaria para ayudar a producir leche materna. Es aconsejable que pruebe la lactancia inducida si:  · Adopta un bebé.  · Tessa madre sustituta está embarazada de roberts bebé.  · Debe dejar de amamantar por un lapso de tiempo.  ¿CÓMO ACTÚA?  Jailene el embarazo, las hormonas se modifican para preparar al organismo para que produzca leche materna. Después del embarazo, las hormonas envían señales al organismo para que comience a producir leche materna para alimentar al bebé. Si usted no atraviesa por estos cambios, puede resultarle difícil producir la cantidad suficiente de leche materna para alimentar al bebé. El médico y el asesor en lactancia pueden ayudarla a que produzca leche con medicamentos y con estimulación mamaria.   ¿PRODUCIRÉ LA CANTIDAD DE LECHE SUFICIENTE PARA ALIMENTAR AL BEBÉ?  La lactancia inducida puede resultar satisfactoria. Sin embargo, muy pocas mujeres que usaron la lactancia inducida para producir leche materna pueden producir toda la leche que nilam bebés necesitan. [place] vez deba alimentar al bebé con leche materna donada o con leche maternizada, además de la leche materna. [place] garantizará que el bebé reciba la alimentación adecuada. Generalmente, la lactancia inducida es más satisfactoria si ha estado embarazada antes.   ¿DE QUÉ FORMA EL CUERPO PRODUCE LECHE?  Para inducir la lactancia, comenzará a coleen hormonas 3 o 4 meses antes de empezar a amamantar. Seguirá tomándolas hasta unas 6 semanas antes de la llegada del bebé. Cuando deje de coleen las hormonas, tendrá que estimularse las mamas varias veces por día para alentar la producción de leche materna. Para realizar la estimulación mamaria, puede frotarse y estirarse el tejido del pezón suavemente. La  estimulación mamaria también puede hacerse con maryam bomba mamaria eléctrica doble de hospital, para imitar la succión de un bebé. Intente extraerse leche de ambas mamas cada 3 horas (8 veces por día) shannan 20 minutos. Si no puede hacerlo tantas veces por día, hágalo con la mayor frecuencia posible. Utuado ayuda a que el organismo siga produciendo leche. Cuando ponga al bebé en la mama, es posible que el organismo también responda al olor, el poncho y la sensación del bebé al aumentar la cantidad de leche que usted produce.   El médico también puede recetarle medicamentos para estimular la lactancia y aumentar el suministro de leche. Además, hay medicamentos herbales disponibles para inducir la lactancia. Es importante saber que estos medicamentos no cuentan con la aprobación de la FDA ni están regulados por greg organismo. Consulte siempre al médico antes de coleen algún medicamento herbal.  ¿QUÉ MÁS UZMA SABER?  · Solo debe coleen las hormonas y los medicamentos kaleigh le indicó el médico o el asesor capacitado en lactancia.  · Hable con el médico o el asesor en lactancia si necesita orientación. Ellos podrán ayudarla a que empiece a producir leche y la aconsejarán cuando tome decisiones importantes sobre cómo alimentar al bebé.  · Intente utilizar un sistema de alimentación suplementaria para proporcionarle al bebé leche materna donada o leche maternizada extra en la mama mientras se alimenta. Utuado garantiza que el bebé reciba la nutrición adecuada mientras lo está amamantando. Consulte a un especialista en lactancia para que la ayude a encontrar y utilizar greg dispositivo.     Esta información no tiene kaleigh fin reemplazar el consejo del médico. Asegúrese de hacerle al médico cualquier pregunta que tenga.     Document Released: 06/05/2009 Document Revised: 12/23/2014  Elsevier Interactive Patient Education ©2016 Elsevier Inc.  Medroxyprogesterone injection [Contraceptive]  ¿Qué es greg medicamento?  Las inyecciones  anticonceptivas de MEDROXIPROGESTERONA previenen el embarazo. Las inyecciones le brindarán control de la natalidad shannan 3 meses. La Depo-subQ Provera 104 se utiliza también para tratar el dolor relacionado con endometriosis.  Greg medicamento puede ser utilizado para otros usos; si tiene alguna pregunta consulte con roberts proveedor de atención médica o con roberts farmacéutico.  MARCAS COMERCIALES DISPONIBLES: Depo-Provera, Depo-subQ Provera 104  ¿Qué le elie informar a mi profesional de la idania antes de coleen greg medicamento?  Necesita saber si usted presenta alguno de los siguientes problemas o situaciones:  -si consume alcohol con frecuencia  -asma  -enfermedad vascular o antecedente de coágulos sanguíneos en los pulmones o las piernas  -enfermedad de los huesos, kaleigh osteoporosis  -cáncer de mama  -diabetes  -trastornos de la alimentación (anorexia nerviosa o bulimia)  -bharat presión sanguínea  -infecciones por VIH o SIDA  -enfermedad renal  -enfermedad hepática  -depresión mental  -migraña  -convulsiones  -derrame cerebral  -fuma tabaco  -sangrado vaginal  -maryam reacción alérgica o inusual a la medroxiprogesterona, otras hormonas, otros medicamentos, alimentos, colorantes o conservantes  -si está embarazada o buscando quedar embarazada  -si está amamantando a un bebé  ¿Cómo elie utilizar greg medicamento?  El anticonceptivo de Depo-Provera se inyecta por vía intramuscular. La Depo-SubQ Provera 104 se inyecta por vía subcutánea. Las administra un profesional de la idania. Usted no puede estar embarazada antes de recibir maryam inyección. La inyección normalmente se aplica shannan los primeros 5 días después de comenzar un período menstrual o 6 semanas después de un parto.  Hable con roberts pediatra para informarse acerca del uso de greg medicamento en niños. Puede requerir atención especial. Estas inyecciones banegas sido usadas en niñas que banegas empezados a tener períodos menstruales.  Sobredosis: Póngase en contacto  inmediatamente con un centro toxicológico o maryam hortencia de urgencia si usted galdino que haya tomado demasiado medicamento.  ATENCIÓN: Greg medicamento es solo para usted. No comparta greg medicamento con nadie.  ¿Qué sucede si me olvido de maryam dosis?  Trate de no olvidar ninguna dosis. Para mantener el control de natalidad necesitará maryam inyección cada 3 meses. Si no puede asistir a maryam yamini, comuníquese con roberts profesional de la idania para que se la cambie. Si espera más de 13 semanas entre las inyecciones anticonceptivos de Depo-Provera o más de 14 semanas entre inyecciones anticonceptivos de Depo-subQ Provera 104, puede quedarse embarazada. Si no puede asistir a roberts yamini utilice otro método anticonceptivo. [place] vez deba hacerse maryam prueba de embarazo antes de recibir otra inyección.  ¿Qué puede interactuar con greg medicamento?  No tome esta medicina con ninguno de los siguientes medicamentos:  -bosentano  Esta medicina también puede interactuar con los siguientes medicamentos:  -aminoglutethimide  -antibióticos o medicamentos para infecciones, especialmente rifampicina, rifabutina, rifapentina y griseofulvina  -aprepitant  -barbitúricos, tales kaleigh el fenobarbital o primidona  -bexaroteno  -carbamazepina  -medicamentos para convulsiones, tales kaleigh etotoína, felbamato, oxcarbazepina, fenitoína, topiramato  -modafinilo  -hierba de [place]  Puede ser que esta lista no menciona todas las posibles interacciones. Informe a roberts profesional de la idania de todos los productos a base de hierbas, medicamentos de venta aris o suplementos nutritivos que esté tomando. Si usted fuma, consume bebidas alcohólicas o si utiliza drogas ilegales, indíqueselo también a roberts profesional de la idania. Algunas sustancias pueden interactuar con roberts medicamento.  ¿A qué elie estar atento al usar greg medicamento?  Greg medicamento no la protege de la infección por VIH (SIDA) ni de otras enfermedades de transmisión sexual.  El uso de greg producto  puede provocar maryam pérdida de calcio de nilam huesos. La pérdida de calcio puede provocar huesos débiles (osteoporosis). Sólo use greg producto shannan más de 2 años si otras formas de anticonceptivos no son apropiados para usted. Mientre más tiempo use greg producto para el control de la natalidad, tendrá más riesgo de padecer de huesos débiles. Consulte a roberts profesional de la idania acerca de cómo puede mantener los huesos pedro luis.  Puede experimentar un cambio en el patrón de sangrado o periodos irregulares. Muchas mujeres meagan de tener periodos mientras usan rgeg medicamento.  Si recibe nilam inyecciones a tiempo, la posibilidad de quedarse embarazada es muy baja. Si galdino que podrá estar embarazada, visite a roberts profesional de la idania lo antes posible.  Si desea quedar embarazada dentro del próximo año, informe a roberts profesional de la idania. El efecto de greg medicamento puede perdurar shannan mucho tiempo después de recibir roberts última inyección.  ¿Qué efectos secundarios puedo tener al utilizar greg medicamento?  Efectos secundarios que debe informar a roberts médico o a roberts profesional de la idania tan pronto kaleigh sea posible:  -reacciones alérgicas kaleigh erupción cutánea, picazón o urticarias, hinchazón de la dion, labios o lengua  -secreciones o sensibilidad de las mamas  -problemas respiratorios  -cambios en la visión  -depresión  -sensación de desmayos o mareos, caídas  -fiebre  -dolor en el abdomen, pecho, entrepierna o piernas  -problemas de coordinación, del habla, al caminar  -cansancio o debilidad inusual  -color amarillento de los ojos o la piel  Efectos secundarios que, por lo general, no requieren atención médica (debe informarlos a médico o a roberts profesional de la idania si persisten o si son molestos):  -ácne  -retención de líquidos e hinchazón  -dolor de bonifacio  -períodos menstruales irregulares, manchando o ausencia de períodos menstruales  -dolor, picazón o reacción cutánea temporal en el lugar de la  inyección  -aumento de peso  Puede ser que esta lista no menciona todos los posibles efectos secundarios. Comuníquese a roberts médico por asesoramiento médico sobre los efectos secundarios. Usted puede informar los efectos secundarios a la FDA por teléfono al 1-800-FDA-1088.  ¿Dónde elie guardar mi medicina?  No se aplica en greg osmel. Un profesional de la idania le administrará las inyecciones.  ATENCIÓN: Greg folleto es un resumen. Puede ser que no cubra toda la posible información. Si usted tiene preguntas acerca de esta medicina, consulte con roberts médico, roberts farmacéutico o roberts profesional de la idania.  © 2014, Elsevier/Gold Standard. (3/1/2010 3:09:00 PM)

## 2017-08-11 NOTE — PROGRESS NOTES
Subjective:      Jaylene Deleon is a 36 y.o. female who presents for 4 week PP exam     Subjective   Subjective:    Jaylene Deleon is a 36 y.o. female who presents for her postpartum exam 4 weeks following  shoulder dystocia. Her prenatal course was uneventful. Her postpartum course was complicated by nothing. She denies dysuria, vaginal bleeding, odor, itching or breast problems. She is breast and bottle feeding. She desires a Depo injection for her birth control method. Reports no sex prior to this appointment. Eating a regular diet without difficulty. Bowel movement are Normal.  The patient is not having any pain. No vaginal bleeding. Patient  deniespostpartum depression.     Problem List     Patient Active Problem List    Diagnosis Date Noted   • Chronic bilateral low back pain without sciatica 2017     Priority: Low               HPI    Review of Systems   Constitutional: Negative.    HENT: Negative.    Eyes: Negative.    Respiratory: Negative.    Cardiovascular: Negative.    Gastrointestinal: Negative.    Genitourinary: Negative.    Musculoskeletal: Negative.    Skin: Negative.    Neurological: Negative.    Endo/Heme/Allergies: Negative.    Psychiatric/Behavioral: Negative.  Negative for depression.          Objective:     /64 mmHg  Wt 71.668 kg (158 lb)  Breastfeeding? Unknown     Physical Exam   Constitutional: She is oriented to person, place, and time. She appears well-developed and well-nourished.   HENT:   Head: Normocephalic.   Eyes: Conjunctivae are normal. Pupils are equal, round, and reactive to light.   Neck: Normal range of motion. Neck supple. No thyromegaly present.   Cardiovascular: Normal rate, regular rhythm, normal heart sounds and intact distal pulses.    Pulmonary/Chest: Effort normal and breath sounds normal. Right breast exhibits no inverted nipple, no mass, no nipple discharge, no skin change and no tenderness. Left breast exhibits no inverted  nipple, no mass, no nipple discharge, no skin change and no tenderness. Breasts are symmetrical.   Abdominal: Soft. Bowel sounds are normal. Hernia confirmed negative in the right inguinal area and confirmed negative in the left inguinal area.   Genitourinary: Vagina normal and uterus normal. No breast bleeding. Pelvic exam was performed with patient supine. No labial fusion. There is no rash, tenderness, lesion or injury on the right labia. There is no rash, tenderness, lesion or injury on the left labia. Uterus is not deviated, not enlarged, not fixed and not tender. Cervix exhibits no motion tenderness. Right adnexum displays no mass, no tenderness and no fullness. Left adnexum displays no mass, no tenderness and no fullness. No erythema, tenderness or bleeding in the vagina. No foreign body around the vagina. No signs of injury around the vagina. No vaginal discharge found.   Lymphadenopathy:        Right: No inguinal adenopathy present.        Left: No inguinal adenopathy present.   Neurological: She is alert and oriented to person, place, and time. She has normal reflexes.   Skin: Skin is warm and dry.   Psychiatric: She has a normal mood and affect. Her behavior is normal. Judgment and thought content normal.   Nursing note and vitals reviewed.              Assessment/Plan:     1. Postpartum care and examination of lactating mother    2. Encounter for initial prescription of injectable contraceptive  - medroxyPROGESTERone (DEPO-PROVERA) 150 MG/ML Suspension; 1 mL by Intramuscular route Once for 1 dose.  Dispense: 1 Vial; Refill: 4       1. PP care of lactating women   2. Exam WNL   3. Pap WNL on 1/5/17   4. Desires contraception : Depo Provera    Plan   Plan:    1. Breastfeeding support   2. Continue PNV   3. Contraceptive counseling - follow up w health dept or [facility] or [facility] for Depo   4. Encouraged condom use Yes   5. Discussed diet, exercise and resumption of sexual activity   6.  Follow up  needed 1-2 days for injection with MA if decided to come here there is a script sent to [facility]    every 3 months for injections

## 2017-11-14 ENCOUNTER — HOSPITAL ENCOUNTER (EMERGENCY)
Facility: MEDICAL CENTER | Age: 37
End: 2017-11-14
Attending: EMERGENCY MEDICINE

## 2017-11-14 VITALS
HEIGHT: 66 IN | DIASTOLIC BLOOD PRESSURE: 60 MMHG | BODY MASS INDEX: 26.11 KG/M2 | TEMPERATURE: 97.8 F | SYSTOLIC BLOOD PRESSURE: 118 MMHG | WEIGHT: 162.48 LBS | OXYGEN SATURATION: 99 % | HEART RATE: 80 BPM | RESPIRATION RATE: 17 BRPM

## 2017-11-14 DIAGNOSIS — K08.89 PAIN, DENTAL: ICD-10-CM

## 2017-11-14 PROCEDURE — 99283 EMERGENCY DEPT VISIT LOW MDM: CPT

## 2017-11-14 RX ORDER — PENICILLIN V POTASSIUM 500 MG/1
500 TABLET ORAL
Qty: 40 TAB | Refills: 0 | Status: SHIPPED | OUTPATIENT
Start: 2017-11-14 | End: 2017-11-24

## 2017-11-14 RX ORDER — NAPROXEN 500 MG/1
500 TABLET ORAL 2 TIMES DAILY WITH MEALS
Qty: 20 TAB | Refills: 0 | Status: SHIPPED | OUTPATIENT
Start: 2017-11-14

## 2017-11-14 ASSESSMENT — PAIN SCALES - WONG BAKER: WONGBAKER_NUMERICALRESPONSE: HURTS EVEN MORE

## 2017-11-14 NOTE — ED NOTES
Pt to triage c/o front lower dental pain x 1 day. Pt advised to return to triage nurse for any changes or concerns.

## 2017-11-15 NOTE — ED PROVIDER NOTES
"ED Provider Note    Scribed for Fredy Tomlinson M.D. by Crhistel Olivier. 11/14/2017, 4:59 PM.    Primary care provider: Pcp Pt States None  Means of arrival: Walk in  History obtained from: Patient  History limited by: None    CHIEF COMPLAINT  Chief Complaint   Patient presents with   • Dental Pain       HPI  Jaylene Deleon is a 37 y.o. female who presents to the Emergency Department for dental pain onset three days ago that has progressed in severity. She identifies pain to the bottom front teeth. The patient denies fever or discharge. She reports no additional medical problems. The patient states she does not currently have a dentist.     REVIEW OF SYSTEMS  Pertinent positives include dental pain. Pertinent negatives include no fever or discharge.  See HPI for further details.  E.    PAST MEDICAL HISTORY   has a past medical history of Umbilical hernia (12/2009).    SURGICAL HISTORY   has a past surgical history that includes anjelica by laparoscopy (10/16/2011); umbilical hernia repair (10/16/2011); and hernia repair.    SOCIAL HISTORY  Social History   Substance Use Topics   • Smoking status: Never Smoker   • Smokeless tobacco: None noted.    • Alcohol use Yes      Comment: Soccially only but not during pregnancy      History   Drug Use No       FAMILY HISTORY  Family History   Problem Relation Age of Onset   • Hypertension Mother        CURRENT MEDICATIONS  Home Medications    **Home medications have not yet been reviewed for this encounter**         ALLERGIES  Allergies   Allergen Reactions   • Nkda [No Known Drug Allergy]        PHYSICAL EXAM  VITAL SIGNS: /52   Pulse 84   Temp 36.6 °C (97.8 °F)   Resp 14   Ht 1.676 m (5' 6\")   Wt 73.7 kg (162 lb 7.7 oz)   SpO2 99%   BMI 26.22 kg/m²     Constitutional: Well developed, Well nourished, No acute distress, Non-toxic appearance.   HENT: Inflammation of gingiva, Tenderness to all four lower incisors, No fluctuance or draingae, No fullness in sublingual " or submandibular glands, Normocephalic, Atraumatic  Cardiovascular: Regular pulse  Lungs: No respiratory distress  Skin: Warm, Dry, no rash  Extremities: No edema  Neurologic: Alert, appropriate, follows commands  Psychiatric: Affect normal    COURSE & MEDICAL DECISION MAKING  Nursing notes, VS, PMSFHx reviewed in chart.     4:59 PM Patient seen and examined at bedside. She was made aware she will be prescribed antibiotics and pain medication and should follow up with a dentist. The patient was agreeable to this plan of care. She will be discharged at this time.     Decision Making:  This is a 37 y.o. year old female who presents with dental pain on the lower alveolar ridge by her frontal incisors. I see no evidence of swelling or significant discharge. The patient will be covered with antibiotics and written for Naprosyn for pain. She will follow up with a dentist as soon as possible and return for redness, increased pain swelling or fever.    I reviewed prescription monitoring program for patient's narcotic use before prescribing a scheduled drug.The patient will not drink alcohol nor drive with prescribed medications. The patient will return for new or worsening symptoms and is stable at the time of discharge.    DISPOSITION:  Patient will be discharged home in stable condition.    FOLLOW UP:  follow-up with your dentist as soon as possible      return for swelling, fever or other concerns      OUTPATIENT MEDICATIONS:  New Prescriptions    NAPROXEN (NAPROSYN) 500 MG TAB    Take 1 Tab by mouth 2 times a day, with meals.    PENICILLIN V POTASSIUM (VEETID) 500 MG TAB    Take 1 Tab by mouth 4 Times a Day,Before Meals and at Bedtime for 10 days.       FINAL IMPRESSION  1. Pain, dental          Christel CUENCA), am scribing for, and in the presence of, Fredy Tomlinson M.D..    Electronically signed by: Christel Worley), 11/14/2017    Fredy CUENCA M.D. personally performed the services described in this  documentation, as scribed by Christel Olivier in my presence, and it is both accurate and complete.    The note accurately reflects work and decisions made by me.  Fredy Tomlinson  11/14/2017  5:14 PM

## 2017-11-15 NOTE — ED NOTES
Pt discharged home as ordered by erp. Pt instructed to follow up with a dentist. Pt given dental referral sheet. Pt given Rxs. Pt left ambulating independently with her family

## 2018-05-25 ENCOUNTER — APPOINTMENT (OUTPATIENT)
Dept: RADIOLOGY | Facility: MEDICAL CENTER | Age: 38
End: 2018-05-25
Attending: EMERGENCY MEDICINE

## 2018-05-25 ENCOUNTER — HOSPITAL ENCOUNTER (EMERGENCY)
Facility: MEDICAL CENTER | Age: 38
End: 2018-05-25
Attending: EMERGENCY MEDICINE

## 2018-05-25 VITALS
TEMPERATURE: 98.1 F | OXYGEN SATURATION: 100 % | HEART RATE: 90 BPM | BODY MASS INDEX: 24.77 KG/M2 | HEIGHT: 66 IN | DIASTOLIC BLOOD PRESSURE: 57 MMHG | RESPIRATION RATE: 18 BRPM | SYSTOLIC BLOOD PRESSURE: 106 MMHG | WEIGHT: 154.1 LBS

## 2018-05-25 DIAGNOSIS — N93.9 ABNORMAL UTERINE BLEEDING: ICD-10-CM

## 2018-05-25 DIAGNOSIS — E87.6 HYPOKALEMIA: ICD-10-CM

## 2018-05-25 DIAGNOSIS — E86.0 DEHYDRATION: ICD-10-CM

## 2018-05-25 LAB
ANION GAP SERPL CALC-SCNC: 12 MMOL/L (ref 0–11.9)
B-HCG SERPL-ACNC: <0.6 MIU/ML (ref 0–5)
BASOPHILS # BLD AUTO: 0.4 % (ref 0–1.8)
BASOPHILS # BLD: 0.04 K/UL (ref 0–0.12)
BUN SERPL-MCNC: 7 MG/DL (ref 8–22)
CALCIUM SERPL-MCNC: 10 MG/DL (ref 8.5–10.5)
CHLORIDE SERPL-SCNC: 107 MMOL/L (ref 96–112)
CO2 SERPL-SCNC: 19 MMOL/L (ref 20–33)
CREAT SERPL-MCNC: 0.63 MG/DL (ref 0.5–1.4)
EOSINOPHIL # BLD AUTO: 0.01 K/UL (ref 0–0.51)
EOSINOPHIL NFR BLD: 0.1 % (ref 0–6.9)
ERYTHROCYTE [DISTWIDTH] IN BLOOD BY AUTOMATED COUNT: 38.4 FL (ref 35.9–50)
GLUCOSE SERPL-MCNC: 100 MG/DL (ref 65–99)
HCT VFR BLD AUTO: 41.1 % (ref 37–47)
HGB BLD-MCNC: 14.1 G/DL (ref 12–16)
IMM GRANULOCYTES # BLD AUTO: 0.04 K/UL (ref 0–0.11)
IMM GRANULOCYTES NFR BLD AUTO: 0.4 % (ref 0–0.9)
LYMPHOCYTES # BLD AUTO: 3.18 K/UL (ref 1–4.8)
LYMPHOCYTES NFR BLD: 31.5 % (ref 22–41)
MCH RBC QN AUTO: 29.4 PG (ref 27–33)
MCHC RBC AUTO-ENTMCNC: 34.3 G/DL (ref 33.6–35)
MCV RBC AUTO: 85.6 FL (ref 81.4–97.8)
MONOCYTES # BLD AUTO: 0.45 K/UL (ref 0–0.85)
MONOCYTES NFR BLD AUTO: 4.5 % (ref 0–13.4)
NEUTROPHILS # BLD AUTO: 6.38 K/UL (ref 2–7.15)
NEUTROPHILS NFR BLD: 63.1 % (ref 44–72)
NRBC # BLD AUTO: 0 K/UL
NRBC BLD-RTO: 0 /100 WBC
NUMBER OF RH DOSES IND 8505RD: NORMAL
PLATELET # BLD AUTO: 363 K/UL (ref 164–446)
PMV BLD AUTO: 9.9 FL (ref 9–12.9)
POTASSIUM SERPL-SCNC: 3 MMOL/L (ref 3.6–5.5)
RBC # BLD AUTO: 4.8 M/UL (ref 4.2–5.4)
RH BLD: NORMAL
SODIUM SERPL-SCNC: 138 MMOL/L (ref 135–145)
WBC # BLD AUTO: 10.1 K/UL (ref 4.8–10.8)

## 2018-05-25 PROCEDURE — 96375 TX/PRO/DX INJ NEW DRUG ADDON: CPT

## 2018-05-25 PROCEDURE — 80048 BASIC METABOLIC PNL TOTAL CA: CPT

## 2018-05-25 PROCEDURE — 96365 THER/PROPH/DIAG IV INF INIT: CPT

## 2018-05-25 PROCEDURE — 86901 BLOOD TYPING SEROLOGIC RH(D): CPT

## 2018-05-25 PROCEDURE — 700111 HCHG RX REV CODE 636 W/ 250 OVERRIDE (IP): Performed by: EMERGENCY MEDICINE

## 2018-05-25 PROCEDURE — 99285 EMERGENCY DEPT VISIT HI MDM: CPT

## 2018-05-25 PROCEDURE — 85025 COMPLETE CBC W/AUTO DIFF WBC: CPT

## 2018-05-25 PROCEDURE — 700105 HCHG RX REV CODE 258: Performed by: EMERGENCY MEDICINE

## 2018-05-25 PROCEDURE — 76830 TRANSVAGINAL US NON-OB: CPT

## 2018-05-25 PROCEDURE — 84702 CHORIONIC GONADOTROPIN TEST: CPT

## 2018-05-25 RX ORDER — POTASSIUM CHLORIDE 7.45 MG/ML
10 INJECTION INTRAVENOUS ONCE
Status: COMPLETED | OUTPATIENT
Start: 2018-05-25 | End: 2018-05-25

## 2018-05-25 RX ORDER — POTASSIUM CHLORIDE 20 MEQ/1
20 TABLET, EXTENDED RELEASE ORAL 2 TIMES DAILY
Qty: 20 TAB | Refills: 0 | Status: SHIPPED | OUTPATIENT
Start: 2018-05-25 | End: 2018-06-04

## 2018-05-25 RX ORDER — SODIUM CHLORIDE 9 MG/ML
1000 INJECTION, SOLUTION INTRAVENOUS ONCE
Status: COMPLETED | OUTPATIENT
Start: 2018-05-25 | End: 2018-05-25

## 2018-05-25 RX ORDER — MORPHINE SULFATE 4 MG/ML
4 INJECTION, SOLUTION INTRAMUSCULAR; INTRAVENOUS ONCE
Status: COMPLETED | OUTPATIENT
Start: 2018-05-25 | End: 2018-05-25

## 2018-05-25 RX ORDER — ONDANSETRON 2 MG/ML
4 INJECTION INTRAMUSCULAR; INTRAVENOUS ONCE
Status: COMPLETED | OUTPATIENT
Start: 2018-05-25 | End: 2018-05-25

## 2018-05-25 RX ADMIN — MORPHINE SULFATE 4 MG: 4 INJECTION INTRAVENOUS at 13:41

## 2018-05-25 RX ADMIN — ONDANSETRON 4 MG: 2 INJECTION INTRAMUSCULAR; INTRAVENOUS at 13:42

## 2018-05-25 RX ADMIN — POTASSIUM CHLORIDE 10 MEQ: 7.46 INJECTION, SOLUTION INTRAVENOUS at 14:45

## 2018-05-25 RX ADMIN — SODIUM CHLORIDE 1000 ML: 9 INJECTION, SOLUTION INTRAVENOUS at 14:45

## 2018-05-25 ASSESSMENT — PAIN SCALES - GENERAL
PAINLEVEL_OUTOF10: 5
PAINLEVEL_OUTOF10: 10

## 2018-05-25 NOTE — ED TRIAGE NOTES
"  Chief Complaint   Patient presents with   • Vaginal Bleeding     *3 days, spotting until this AM when patient had severe bleeding for one hour +clots +cramping     Patient to triage in wheelchair. Patient does not know how far along she is, or if she is pregnant.  Patient does not know when LMP was because she has irregular periods.  States that she has not been gaining weight.  Soaked through clothes and 1 pad.    Uz[place] speaking only.  Translation done by family.    /78   Pulse (!) 102   Temp 36.7 °C (98.1 °F)   Resp 17   Ht 1.676 m (5' 6\")   Wt 69.9 kg (154 lb 1.6 oz)   SpO2 100%   BMI 24.87 kg/m²     "

## 2018-05-25 NOTE — ED NOTES
Reviewed d/c instructions with pt and spouse. IV d/c intact. Left in NAD with steady gait. A&OX4.

## 2018-05-25 NOTE — ED PROVIDER NOTES
ED Provider Note    CHIEF COMPLAINT  Chief Complaint   Patient presents with   • Vaginal Bleeding     *3 days, spotting until this AM when patient had severe bleeding for one hour +clots +cramping       HPI  Jaylene Deleon is a 37 y.o. female who presents to the emergency department complaining of vaginal bleeding.  Symptoms began this morning with lower abdominal crampy pain.  The patient is not sure whether or not she is pregnant she has not done a pregnancy test.  The patient says she has a history of irregular menses.    REVIEW OF SYSTEMS no fever or chills no nausea vomiting no chest pain or difficulty breathing no palpitations.  All other systems negative    PAST MEDICAL HISTORY  Past Medical History:   Diagnosis Date   • Umbilical hernia 12/2009       FAMILY HISTORY  Family History   Problem Relation Age of Onset   • Hypertension Mother        SOCIAL HISTORY  Social History     Social History   • Marital status:      Spouse name: N/A   • Number of children: N/A   • Years of education: N/A     Social History Main Topics   • Smoking status: Never Smoker   • Smokeless tobacco: Not on file   • Alcohol use Yes      Comment: Soccially only but not during pregnancy   • Drug use: No   • Sexual activity: Not Currently     Partners: Male      Comment: none. Unplanned pregnancy      Other Topics Concern   • Not on file     Social History Narrative   • No narrative on file       SURGICAL HISTORY  Past Surgical History:   Procedure Laterality Date   • MARYELLEN BY LAPAROSCOPY  10/16/2011    Performed by SANDHYA CAMARENA at SURGERY [facility] ORS   • UMBILICAL HERNIA REPAIR  10/16/2011    Performed by SANDHYA CAMARENA at SURGERY [facility] ORS   • HERNIA REPAIR         CURRENT MEDICATIONS  Home Medications     Reviewed by Dannielle Chavarria R.N. (Registered Nurse) on 05/25/18 at 1350  Med List Status: Not Addressed   Medication Last Dose Status   ibuprofen (MOTRIN) 800 MG Tab  Active   naproxen  "(NAPROSYN) 500 MG Tab  Active   Prenatal MV-Min-Fe Fum-FA-DHA (PRENATAL 1 PO) 7/8/2017 Active                ALLERGIES  Allergies   Allergen Reactions   • Nkda [No Known Drug Allergy]        PHYSICAL EXAM  VITAL SIGNS: /78   Pulse (!) 102   Temp 36.7 °C (98.1 °F)   Resp 17   Ht 1.676 m (5' 6\")   Wt 69.9 kg (154 lb 1.6 oz)   SpO2 100%   BMI 24.87 kg/m²    Oxygen saturation is interpreted as adequate  Constitutional: Awake and well-appearing individual in no distress  HENT: Mucous membranes are moist  Eyes: No erythema or discharge or jaundice  Neck: Trachea midline no JVD  Cardiovascular: Regular rate and rhythm  Lungs: Clear and equal bilaterally with no apparent difficulty breathing  Abdomen/Back: Soft nontender nondistended no rebound guarding or peritoneal findings, the patient subjectively complains of crampy lower abdominal pain but she does not have any localizable tenderness on physical exam  Skin: Warm and dry  Musculoskeletal: No acute bony deformity  Neurologic: Awake verbal moving all extremities without difficulty    Laboratory  CBC shows white blood cell count of 10.1 hemoglobin adequate at 14.1 and basic metabolic panel shows a slightly low potassium of 3.0 and a low bicarb of 19, pregnancy test is negative Rh is positive    Radiology  US-GYN-PELVIS TRANSVAGINAL   Final Result      Unremarkable transvaginal appearance of the pelvis.            MEDICAL DECISION MAKING and DISPOSITION  In the emergency department an IV was established the patient was given intravenous morphine and Zofran and fluids as well as supplemental potassium.  I reviewed all the findings with the patient and her family and at this point in time I think will be safe for her to go home I have advised her that she is dehydrated based on her abnormal bicarb of 19 and she is to drink lots of fluids to maintain hydration.  She has a slightly low potassium and we have given her supplemental potassium here but I have written " her a prescription for a 10 day course of oral supplemental potassium as well.  The patient is to establish a primary care doctor by calling the [facility] in arranging office recheck during the week and she is also to call Dr. Villavicencio's office and arrange follow-up with gynecology as soon as possible.  If she feels she is having new or worsening symptoms she may return here for recheck    IMPRESSION  1.  Abnormal uterine bleeding  2.  Hypokalemia  3.  Dehydration      Electronically signed by: Brice Cervantes, 5/25/2018 3:32 PM

## 2018-05-25 NOTE — DISCHARGE INSTRUCTIONS
Drink lots of fluids to maintain hydration.  Return here if you are having new or worsening symptoms.  You will need to follow-up with both the gynecologist Dr. Villavicencio by calling the office and arranging follow-up during the week and you need to establish a primary care doctor at the [facility] and call the office and arrange follow-up next week

## 2018-11-16 ENCOUNTER — HOSPITAL ENCOUNTER (OUTPATIENT)
Dept: RADIOLOGY | Facility: MEDICAL CENTER | Age: 38
End: 2018-11-16
Attending: NURSE PRACTITIONER

## 2018-11-16 DIAGNOSIS — N94.9 CERVICAL MOTION TENDERNESS: ICD-10-CM

## 2019-12-04 NOTE — ED NOTES
Jaylene Deleon  36 y.o.  Chief Complaint   Patient presents with   • Pregnancy     3 months   • Abdominal Pain     left sided, starts low and radiates up into her upper quadrant. States that she has had this pain x 12 weeks, has been seen befor for same     Pt denies cramping or vaginal discharge   Protopic Pregnancy And Lactation Text: This medication is Pregnancy Category C. It is unknown if this medication is excreted in breast milk when applied topically.

## 2020-02-06 NOTE — MR AVS SNAPSHOT
Problem: Safety  Goal: Will remain free from falls  Intervention: Implement fall precautions  Note:   Received pt in a recliner chair , pt stated he sleeps in it at home.Pt with functional debility and decrease sensation to left arm. Safety precautions explained, alarm on .Pt free from call and injury .     Problem: Other Problem (see comments)  Intervention: Initiate TF/TPN/PPN (see comments)  Note:   Aspiration precautions observed .Pt with PEG tube, receives Impact Peptide  bolus QID., due feeding given at 2100 hrs = 200 ml , flushed with tap water  = 200 ml. Pt maintained NPO , Meds crushed given via PEG tube , flushed with 30 ml.tap water . Feeding tolerated , no residuals noted .Feeding to be increased to 330 ml in am.     Problem: Respiratory:  Goal: Respiratory status will improve  Intervention: Assess and monitor pulmonary status  Note:   Lungs clear with occasional non productive cough, due RT treatment given . Pt  on a sitting position in the recliner chair. Oxymizer 2 lnc on , for CPAP at night , not available yet.       "        Jaylene Sykes YovaniOneil   2017 1:30 PM   Initial Prenatal   MRN: [id number]    Department[facility]  [facility]   Dept Phone:  [phone]    Description:  Female : 1980   Provider:  Fallon Pearson C.N.M.; PC INTAKE           Allergies as of 2017     Allergen Noted Reactions    Nkda [No Known Drug Allergy] 2010         You were diagnosed with     Encounter for supervision of other normal pregnancy, second trimester   [3663724]  -  Primary     Elderly multigravida in second trimester   [[id number]]         Vital Signs     Blood Pressure Height Weight Body Mass Index Smoking Status       112/48 mmHg 1.676 m (5' 6\") 74.844 kg (165 lb) 26.64 kg/m2 Never Smoker        Basic Information     Date Of Birth Sex Race Ethnicity Preferred Language    1980 Female  or   Origin (Tuvaluan,Grenadian,South Korean,Chris, etc) Tuvaluan      Problem List              ICD-10-CM Priority Class Noted - Resolved    Encounter for supervision of other normal pregnancy, second trimester Z34.82   2017 - Present    Elderly multigravida in second trimester O09.522   2017 - Present      Health Maintenance     Patient has no pending health maintenance at this time      Results     POCT Urinalysis      Component Value Standard Range & Units    POC Color  Negative    POC Appearance  Negative    POC Leukocyte Esterase NEGATIVE Negative    POC Nitrites NEGATIVE Negative    POC Urobiligen  Negative (0.2) mg/dL    POC Protein TRACE Negative mg/dL    POC Urine PH 6.0 5.0 - 8.0    POC Blood SMALL Negative    POC Specific Gravity 1.030 <1.005 - >1.030    POC Ketones NEGATIVE Negative mg/dL    POC Biliruben  Negative mg/dL    POC Glucose NEGATIVE Negative mg/dL                        Current Immunizations     Influenza TIV (IM) 2010  1:45 AM    Tdap Vaccine 2010  1:45 AM      Below and/or attached are the medications your provider expects you to take. Review all of your home medications " and newly ordered medications with your provider and/or pharmacist. Follow medication instructions as directed by your provider and/or pharmacist. Please keep your medication list with you and share with your provider. Update the information when medications are discontinued, doses are changed, or new medications (including over-the-counter products) are added; and carry medication information at all times in the event of emergency situations     Allergies:  NKDA - (reactions not documented)               Medications  Valid as of: January 05, 2017 -  2:49 PM    Generic Name Brand Name Tablet Size Instructions for use    Cephalexin (Cap) KEFLEX 500 MG Take 1 Cap by mouth 3 times a day for 5 days.        Prenatal MV-Min-Fe Fum-FA-DHA   Take  by mouth.        .                 Medicines prescribed today were sent to:     [facility] [address] [place] [address] (S), [place] [address]    [facility] [address] [place] ([place]) [place] [address]    Phone: [phone] Fax: [phone]    Open 24 Hours?: No      Medication refill instructions:       If your prescription bottle indicates you have medication refills left, it is not necessary to call your provider’s office. Please contact your pharmacy and they will refill your medication.    If your prescription bottle indicates you do not have any refills left, you may request refills at any time through one of the following ways: The online 365net system (except Urgent Care), by calling your provider’s office, or by asking your pharmacy to contact your provider’s office with a refill request. Medication refills are processed only during regular business hours and may not be available until the next business day. Your provider may request additional information or to have a follow-up visit with you prior to refilling your medication.   *Please Note: Medication refills are assigned a new Rx number when refilled electronically. Your pharmacy may indicate that no refills were authorized even though a  new prescription for the same medication is available at the pharmacy. Please request the medicine by name with the pharmacy before contacting your provider for a refill.        Your To Do List     Future Labs/Procedures Complete By Expires    AFP TETRA  As directed 1/6/2018    PREG CNTR PRENATAL PN  As directed 1/6/2018    THINPREP RFLX HPV ASCUS W/CTNG  As directed 1/5/2017    US-OB 2ND 3RD TRI COMPLETE  As directed 7/5/2017      Instructions    P:  1.  GC/CT done. Pap done.         2.  Prenatal labs, including AFP ordered - lab slip given        3.  Discussed PNV, diet, and adequate water intake        4.  NOB packet given        5.  Return to office in 4 wks        6.  Complete OB US in 7 wks          CloudCheckr Access Code: [email or web address]  Expires: 1/31/2017  9:00 PM    CloudCheckr  A secure, online tool to manage your health information     [employer] CloudCheckr® is a secure, online tool that connects you to your personalized health information from the privacy of your home -- day or night - making it very easy for you to manage your healthcare. Once the activation process is completed, you can even access your medical information using the CloudCheckr ren, which is available for free in the Apple Ren store or Google Play store.     CloudCheckr provides the following levels of access (as shown below):   My Chart Features   [facility] Primary Care Doctor [facility]  Specialists [facility]  Urgent  Care Non-[facility]  Primary Care  Doctor   Email your healthcare team securely and privately 24/7 X X X    Manage appointments: schedule your next appointment; view details of past/upcoming appointments X      Request prescription refills. X      View recent personal medical records, including lab and immunizations X X X X   View health record, including health history, allergies, medications X X X X   Read reports about your outpatient visits, procedures, consult and ER notes X X X X   See your discharge summary, which is a recap of your  hospital and/or ER visit that includes your diagnosis, lab results, and care plan. X X       How to register for [employer]:  1. Go to  [email or web address].  2. Click on the Sign Up Now box, which takes you to the New Member Sign Up page. You will need to provide the following information:  a. Enter your [employer] Access Code exactly as it appears at the top of this page. (You will not need to use this code after you’ve completed the sign-up process. If you do not sign up before the expiration date, you must request a new code.)   b. Enter your date of birth.   c. Enter your home email address.   d. Click Submit, and follow the next screen’s instructions.  3. Create a In Hand Guidest ID. This will be your [employer] login ID and cannot be changed, so think of one that is secure and easy to remember.  4. Create a In Hand Guidest password. You can change your password at any time.  5. Enter your Password Reset Question and Answer. This can be used at a later time if you forget your password.   6. Enter your e-mail address. This allows you to receive e-mail notifications when new information is available in [employer].  7. Click Sign Up. You can now view your health information.    For assistance activating your [employer] account, call [phone]

## 2020-07-08 ENCOUNTER — HOSPITAL ENCOUNTER (OUTPATIENT)
Dept: RADIOLOGY | Facility: MEDICAL CENTER | Age: 40
End: 2020-07-08
Attending: FAMILY MEDICINE

## 2020-07-08 DIAGNOSIS — R10.32 LEFT LOWER QUADRANT PAIN: ICD-10-CM

## 2020-08-07 ENCOUNTER — HOSPITAL ENCOUNTER (OUTPATIENT)
Dept: RADIOLOGY | Facility: MEDICAL CENTER | Age: 40
End: 2020-08-07
Attending: FAMILY MEDICINE

## 2020-08-07 DIAGNOSIS — R79.89 ELEVATED LIVER FUNCTION TESTS: ICD-10-CM

## 2022-04-23 ENCOUNTER — HOSPITAL ENCOUNTER (OUTPATIENT)
Dept: RADIOLOGY | Facility: MEDICAL CENTER | Age: 42
End: 2022-04-23
Attending: FAMILY MEDICINE

## 2022-04-23 DIAGNOSIS — N91.2 AMENORRHEA: ICD-10-CM

## 2022-08-11 ENCOUNTER — HOSPITAL ENCOUNTER (EMERGENCY)
Facility: MEDICAL CENTER | Age: 42
End: 2022-08-11
Attending: EMERGENCY MEDICINE

## 2022-08-11 VITALS
TEMPERATURE: 97.8 F | BODY MASS INDEX: 24.17 KG/M2 | RESPIRATION RATE: 16 BRPM | SYSTOLIC BLOOD PRESSURE: 124 MMHG | DIASTOLIC BLOOD PRESSURE: 76 MMHG | HEART RATE: 76 BPM | OXYGEN SATURATION: 99 % | HEIGHT: 67 IN | WEIGHT: 154 LBS

## 2022-08-11 DIAGNOSIS — G89.29 CHRONIC BILATERAL LOW BACK PAIN WITHOUT SCIATICA: ICD-10-CM

## 2022-08-11 DIAGNOSIS — M54.50 CHRONIC BILATERAL LOW BACK PAIN WITHOUT SCIATICA: ICD-10-CM

## 2022-08-11 DIAGNOSIS — B02.9 HERPES ZOSTER WITHOUT COMPLICATION: ICD-10-CM

## 2022-08-11 PROCEDURE — 99282 EMERGENCY DEPT VISIT SF MDM: CPT

## 2022-08-11 RX ORDER — PREDNISONE 20 MG/1
60 TABLET ORAL DAILY
Qty: 15 TABLET | Refills: 0 | Status: SHIPPED | OUTPATIENT
Start: 2022-08-11 | End: 2022-08-16

## 2022-08-11 RX ORDER — VALACYCLOVIR HYDROCHLORIDE 1 G/1
1000 TABLET, FILM COATED ORAL 2 TIMES DAILY
Qty: 14 TABLET | Refills: 0 | Status: SHIPPED | OUTPATIENT
Start: 2022-08-11 | End: 2022-08-18

## 2022-08-11 RX ORDER — HYDROCODONE BITARTRATE AND ACETAMINOPHEN 5; 325 MG/1; MG/1
1 TABLET ORAL EVERY 4 HOURS PRN
Qty: 12 TABLET | Refills: 0 | Status: SHIPPED | OUTPATIENT
Start: 2022-08-11 | End: 2022-08-14

## 2022-08-11 NOTE — ED TRIAGE NOTES
Chief Complaint   Patient presents with    Rash     Pt has blotchy rash on hip, lower abdomen, and low back x6 days. Pt reports burning and pain to areas affected.      Pt ambulatory to triage for above complaint. Pt reports taking tylenol but denies relief.

## 2022-08-11 NOTE — ED NOTES
Red welps to L hip and flank area since Monday, denies spreading or drainage- described as burning    Denies fever

## 2023-04-27 NOTE — PROGRESS NOTES
Ob F/U  +FM  Pt denies complaints.   Good phone [phone]  GBS-negative   27-Apr-2023 14:25

## 2023-07-13 ENCOUNTER — HOSPITAL ENCOUNTER (EMERGENCY)
Facility: MEDICAL CENTER | Age: 43
End: 2023-07-13
Attending: EMERGENCY MEDICINE

## 2023-07-13 ENCOUNTER — APPOINTMENT (OUTPATIENT)
Dept: RADIOLOGY | Facility: MEDICAL CENTER | Age: 43
End: 2023-07-13
Attending: EMERGENCY MEDICINE

## 2023-07-13 VITALS
DIASTOLIC BLOOD PRESSURE: 59 MMHG | BODY MASS INDEX: 28.24 KG/M2 | TEMPERATURE: 99 F | OXYGEN SATURATION: 98 % | SYSTOLIC BLOOD PRESSURE: 121 MMHG | WEIGHT: 179.9 LBS | HEART RATE: 71 BPM | RESPIRATION RATE: 16 BRPM | HEIGHT: 67 IN

## 2023-07-13 DIAGNOSIS — O03.4 INCOMPLETE MISCARRIAGE: ICD-10-CM

## 2023-07-13 LAB
ALBUMIN SERPL BCP-MCNC: 4.3 G/DL (ref 3.2–4.9)
ALBUMIN/GLOB SERPL: 1.6 G/DL
ALP SERPL-CCNC: 73 U/L (ref 30–99)
ALT SERPL-CCNC: 51 U/L (ref 2–50)
ANION GAP SERPL CALC-SCNC: 13 MMOL/L (ref 7–16)
AST SERPL-CCNC: 60 U/L (ref 12–45)
B-HCG SERPL-ACNC: 343 MIU/ML (ref 0–5)
BASOPHILS # BLD AUTO: 0.1 % (ref 0–1.8)
BASOPHILS # BLD: 0.01 K/UL (ref 0–0.12)
BILIRUB SERPL-MCNC: 0.9 MG/DL (ref 0.1–1.5)
BUN SERPL-MCNC: 7 MG/DL (ref 8–22)
CALCIUM ALBUM COR SERPL-MCNC: 8.8 MG/DL (ref 8.5–10.5)
CALCIUM SERPL-MCNC: 9 MG/DL (ref 8.5–10.5)
CHLORIDE SERPL-SCNC: 107 MMOL/L (ref 96–112)
CO2 SERPL-SCNC: 20 MMOL/L (ref 20–33)
CREAT SERPL-MCNC: 0.57 MG/DL (ref 0.5–1.4)
EOSINOPHIL # BLD AUTO: 0.03 K/UL (ref 0–0.51)
EOSINOPHIL NFR BLD: 0.4 % (ref 0–6.9)
ERYTHROCYTE [DISTWIDTH] IN BLOOD BY AUTOMATED COUNT: 43.2 FL (ref 35.9–50)
GFR SERPLBLD CREATININE-BSD FMLA CKD-EPI: 116 ML/MIN/1.73 M 2
GLOBULIN SER CALC-MCNC: 2.7 G/DL (ref 1.9–3.5)
GLUCOSE SERPL-MCNC: 135 MG/DL (ref 65–99)
HCG SERPL QL: POSITIVE
HCT VFR BLD AUTO: 36 % (ref 37–47)
HGB BLD-MCNC: 11.9 G/DL (ref 12–16)
IMM GRANULOCYTES # BLD AUTO: 0.02 K/UL (ref 0–0.11)
IMM GRANULOCYTES NFR BLD AUTO: 0.3 % (ref 0–0.9)
LYMPHOCYTES # BLD AUTO: 2.57 K/UL (ref 1–4.8)
LYMPHOCYTES NFR BLD: 35.8 % (ref 22–41)
MCH RBC QN AUTO: 30.5 PG (ref 27–33)
MCHC RBC AUTO-ENTMCNC: 33.1 G/DL (ref 32.2–35.5)
MCV RBC AUTO: 92.3 FL (ref 81.4–97.8)
MONOCYTES # BLD AUTO: 0.29 K/UL (ref 0–0.85)
MONOCYTES NFR BLD AUTO: 4 % (ref 0–13.4)
NEUTROPHILS # BLD AUTO: 4.25 K/UL (ref 1.82–7.42)
NEUTROPHILS NFR BLD: 59.4 % (ref 44–72)
NRBC # BLD AUTO: 0 K/UL
NRBC BLD-RTO: 0 /100 WBC (ref 0–0.2)
NUMBER OF RH DOSES IND 8505RD: NORMAL
PLATELET # BLD AUTO: 233 K/UL (ref 164–446)
PMV BLD AUTO: 10.4 FL (ref 9–12.9)
POTASSIUM SERPL-SCNC: 3.5 MMOL/L (ref 3.6–5.5)
PROT SERPL-MCNC: 7 G/DL (ref 6–8.2)
RBC # BLD AUTO: 3.9 M/UL (ref 4.2–5.4)
RH BLD: NORMAL
SODIUM SERPL-SCNC: 140 MMOL/L (ref 135–145)
WBC # BLD AUTO: 7.2 K/UL (ref 4.8–10.8)

## 2023-07-13 PROCEDURE — 80053 COMPREHEN METABOLIC PANEL: CPT

## 2023-07-13 PROCEDURE — 84702 CHORIONIC GONADOTROPIN TEST: CPT

## 2023-07-13 PROCEDURE — 86901 BLOOD TYPING SEROLOGIC RH(D): CPT

## 2023-07-13 PROCEDURE — A9270 NON-COVERED ITEM OR SERVICE: HCPCS | Performed by: EMERGENCY MEDICINE

## 2023-07-13 PROCEDURE — 85025 COMPLETE CBC W/AUTO DIFF WBC: CPT

## 2023-07-13 PROCEDURE — 76801 OB US < 14 WKS SINGLE FETUS: CPT

## 2023-07-13 PROCEDURE — 84703 CHORIONIC GONADOTROPIN ASSAY: CPT

## 2023-07-13 PROCEDURE — 36415 COLL VENOUS BLD VENIPUNCTURE: CPT

## 2023-07-13 PROCEDURE — 700102 HCHG RX REV CODE 250 W/ 637 OVERRIDE(OP): Performed by: EMERGENCY MEDICINE

## 2023-07-13 PROCEDURE — 99284 EMERGENCY DEPT VISIT MOD MDM: CPT

## 2023-07-13 RX ORDER — ACETAMINOPHEN 500 MG
1000 TABLET ORAL ONCE
Status: COMPLETED | OUTPATIENT
Start: 2023-07-13 | End: 2023-07-13

## 2023-07-13 RX ADMIN — ACETAMINOPHEN 1000 MG: 500 TABLET, FILM COATED ORAL at 15:31

## 2023-07-13 NOTE — ED NOTES
Patient ambulated with steady gait to bathroom. Returned without issue and placed back on monitors.

## 2023-07-13 NOTE — ED PROVIDER NOTES
ED Provider Note    CHIEF COMPLAINT  Chief Complaint   Patient presents with    Vaginal Bleeding     Appointment at [facility] today, they informed her she had a miscarriage and to present to the ER       EXTERNAL RECORDS REVIEWED  Outpatient Notes patient's last normal pregnancy was in 2017    HPI/ROS  LIMITATION TO HISTORY   Select: Language Slovak,  Used   OUTSIDE HISTORIAN(S):  None    Jaylene Deleon is a 42 y.o. female who [facility] to the [facility] chief complaint of possible spontaneous .  She states that her last menstrual period was 2 months ago.  She did not know she was pregnant till today when she went to the [facility].  They did a urinalysis and had a positive pregnancy test and told her to present to the ED for further evaluation.  She states she had some heavy bleeding over the last 2 to 3 days.  She states that she started passing some large clots and tissue and she showed the [facility] clinic the pictures of the tissue and they thought she had an .  States the bleeding is actually slowed down she has a little bit of back cramping but otherwise is feeling well.  She only has a little bit of blood when she uses the restroom now.  No fevers no chills no nausea vomiting.  She does not take any medications regularly.  She is otherwise healthy.  LMP was about 2 months ago she does not know the exact date    PAST MEDICAL HISTORY   has a past medical history of Umbilical hernia (2009).    SURGICAL HISTORY   has a past surgical history that includes anjelica by laparoscopy (10/16/2011); umbilical hernia repair (10/16/2011); and hernia repair.    FAMILY HISTORY  Family History   Problem Relation Age of Onset    Hypertension Mother        SOCIAL HISTORY  Social History     Tobacco Use    Smoking status: Never    Smokeless tobacco: Not on file   Vaping Use    Vaping Use: Never used   Substance and Sexual Activity    Alcohol use: Yes     Comment: Soccially only but  "not during pregnancy    Drug use: No    Sexual activity: Not Currently     Partners: Male     Comment: none. Unplanned pregnancy        CURRENT MEDICATIONS  Home Medications       Reviewed by Elis Daugherty R.N. (Registered Nurse) on 07/13/23 at 1412  Med List Status: Partial     Medication Last Dose Status   ibuprofen (MOTRIN) 800 MG Tab  Active   naproxen (NAPROSYN) 500 MG Tab  Active   Prenatal MV-Min-Fe Fum-FA-DHA (PRENATAL 1 PO)  Active                    ALLERGIES  Allergies   Allergen Reactions    Nkda [No Known Drug Allergy]        PHYSICAL EXAM  VITAL SIGNS: /67   Pulse 72   Temp 36.2 °C (97.2 °F) (Temporal)   Resp 16   Ht 1.702 m (5' 7\")   Wt 81.6 kg (179 lb 14.3 oz)   LMP 05/17/2023 (Approximate)   SpO2 99%   BMI 28.18 kg/m²    Pulse OX: Pulse Oxygen level is within normal limits  Constitutional: Alert in no apparent distress.  HENT: Normocephalic, Atraumatic, MMM  Eyes: PERound. Conjunctiva normal, non-icteric.   Heart: Regular rate and rhythm, intact distal pulses   Lungs: Symmetrical movement, no resp distress   Abdomen: Non-tender, non-distended, normal bowel sounds  EXT/Back no edema lower extremities  Skin: Warm, Dry, No erythema, No rash.   Neurologic: Alert and oriented, Grossly non-focal.       DIAGNOSTIC STUDIES / PROCEDURES    LABS  Labs Reviewed   CBC WITH DIFFERENTIAL - Abnormal; Notable for the following components:       Result Value    RBC 3.90 (*)     Hemoglobin 11.9 (*)     Hematocrit 36.0 (*)     All other components within normal limits   COMP METABOLIC PANEL   HCG QUAL SERUM   RH TYPE FOR RHOGAM FROM E.D.    Narrative:     Print Consent?->No         RADIOLOGY  I have independently interpreted the diagnostic imaging associated with this visit and am waiting the final reading from the radiologist.   My preliminary interpretation is as follows:     US ob 1st trimester -     Radiologist interpretation:     US-OB 1ST TRIMESTER WITH TRANSVAGINAL (COMBO)    (Results Pending) "         COURSE & MEDICAL DECISION MAKING    ED Observation Status? Yes; I am placing the patient in to an observation status due to a diagnostic uncertainty as well as therapeutic intensity. Patient placed in observation status at 3:15 PM, 2023.     Observation plan is as follows: Labs imaging oral pain meds    Upon Reevaluation, the patient's condition has: Improved; and will be discharged.    Patient discharged from ED Observation status at 5:38 PM  (Time) 23  (Date).     INITIAL ASSESSMENT, COURSE AND PLAN  Care Narrative patient is a 42-year-old female with several days of heavy vaginal bleeding with a positive pregnancy test at urgent care today last months.  Being 2 months ago.  Abdomen is soft and nontender I doubt ectopic pregnancy but concern for intrauterine fetal demise incomplete or complete spontaneous .  She only has minimal complaints of lower back discomfort she will be treated with oral Tylenol here in the ED.  hCG quant as well as Rh and ultrasound ordered at this time.  She states her bleeding is actually improved since the last 2 days and only now when she goes to the restroom.  She is really nontender with fevers so I doubt endometritis at this time    DISPOSITION AND DISCUSSIONS  5:38 PM  I reassessed patient at the bedside resting comfortably still have her hCG quant back yet but however ultrasound showing possible retained products her abdomen is soft nontender she is not febrile there is no signs of infection.  Going to refer her to women's health.  She is going to call their office in the morning she can return to the ED over the weekend to for repeat hCG.  And she will follow-up with clinic next week.  She was given strict return precautions for worsening severe pain heavy bleeding or fevers.  She understands feels comfortable going home        I have discussed management of the patient with the following physicians and EKENAN's:  none    Discussion of management with other  QHP or appropriate source(s): None     Escalation of care considered, and ultimately not performed:acute inpatient care management, however at this time, the patient is most appropriate for outpatient management    Barriers to care at this time, including but not limited to: Patient does not have established PCP.     Decision tools and prescription drugs considered including, but not limited to: Antibiotics not indicated at this time .    The patient will return for new or worsening symptoms and is stable at the time of discharge.    The patient is referred to a primary physician for blood pressure management, diabetic screening, and for all other preventative health concerns.    DISPOSITION:  Patient will be discharged home in stable condition.    FOLLOW UP:  [facility]  [address]  [place] [id number] [phone]  Call on 7/14/2023      [facility], Emergency Dept  [address] [id number] [phone]  On 7/16/2023  for repeat HCG      OUTPATIENT MEDICATIONS:  New Prescriptions    No medications on file         FINAL DIAGNOSIS  1. Incomplete miscarriage           Electronically signed by: Chanda Onofre M.D., 7/13/2023 3:15 PM

## 2023-07-13 NOTE — ED TRIAGE NOTES
".  Chief Complaint   Patient presents with    Vaginal Bleeding     Appointment at [facility] today, they informed her she had a miscarriage and to present to the ER       43 yo female ambulatory to triage for above complaint. She was unaware of any pregnancy. Last period over two months ago. She endorses vaginal bleeding for the last seven days, a few of them with clots and possible tissue. Denies lightheadedness or dizziness. Complains of back pain.      Pt is alert and oriented, speaking in full sentences, follows commands and responds appropriately to questions.      Vag Bleed protocol ordered. Patient placed back in lobby and educated on triage process. Asked to inform RN of any changes.    /67   Pulse 72   Temp 36.2 °C (97.2 °F) (Temporal)   Resp 16   Ht 1.702 m (5' 7\")   Wt 81.6 kg (179 lb 14.3 oz)   LMP 05/17/2023 (Approximate)   SpO2 99%   BMI 28.18 kg/m²     "

## 2023-07-14 NOTE — ED NOTES
Pt discharged, all appropriate hospital equipment removed (IV, monitor, pulse ox, etc.). Pt left unit via walking with stable gait with family to ED lobby for transport home. Personal belongings with pt when leaving unit. Pt given discharge instructions prior to leaving unit including where to  prescriptions and when to follow-up; verbalizes understanding. Family used to assist with translation. Patient verbalizes understanding and has no further questions.  Pt informed to return to ED if symptoms worsen/return or altered status develop. Copy of discharge instructions signed and turned into DC basket and copy sent with pt.

## 2023-07-14 NOTE — ED NOTES
Rounded on patient. They remain resting comfortably in bed, on monitors, and room air. No current needs at this time. VSS.

## 2023-07-15 ENCOUNTER — APPOINTMENT (OUTPATIENT)
Dept: RADIOLOGY | Facility: MEDICAL CENTER | Age: 43
End: 2023-07-15
Attending: EMERGENCY MEDICINE

## 2023-07-15 ENCOUNTER — HOSPITAL ENCOUNTER (EMERGENCY)
Facility: MEDICAL CENTER | Age: 43
End: 2023-07-15
Attending: EMERGENCY MEDICINE

## 2023-07-15 VITALS
SYSTOLIC BLOOD PRESSURE: 118 MMHG | RESPIRATION RATE: 16 BRPM | TEMPERATURE: 97.9 F | OXYGEN SATURATION: 98 % | HEART RATE: 62 BPM | WEIGHT: 177.25 LBS | DIASTOLIC BLOOD PRESSURE: 68 MMHG | BODY MASS INDEX: 27.76 KG/M2

## 2023-07-15 DIAGNOSIS — B96.89 BACTERIAL VAGINOSIS: ICD-10-CM

## 2023-07-15 DIAGNOSIS — R21 RASH: ICD-10-CM

## 2023-07-15 DIAGNOSIS — O46.90 VAGINAL BLEEDING DURING PREGNANCY: ICD-10-CM

## 2023-07-15 DIAGNOSIS — N76.0 BACTERIAL VAGINOSIS: ICD-10-CM

## 2023-07-15 LAB
ALBUMIN SERPL BCP-MCNC: 4.3 G/DL (ref 3.2–4.9)
ALBUMIN/GLOB SERPL: 1.5 G/DL
ALP SERPL-CCNC: 89 U/L (ref 30–99)
ALT SERPL-CCNC: 44 U/L (ref 2–50)
ANION GAP SERPL CALC-SCNC: 11 MMOL/L (ref 7–16)
APPEARANCE UR: CLEAR
AST SERPL-CCNC: 43 U/L (ref 12–45)
B-HCG SERPL-ACNC: 128 MIU/ML (ref 0–5)
BACTERIA #/AREA URNS HPF: NEGATIVE /HPF
BASOPHILS # BLD AUTO: 0.5 % (ref 0–1.8)
BASOPHILS # BLD: 0.04 K/UL (ref 0–0.12)
BILIRUB SERPL-MCNC: 0.5 MG/DL (ref 0.1–1.5)
BILIRUB UR QL STRIP.AUTO: NEGATIVE
BUN SERPL-MCNC: 10 MG/DL (ref 8–22)
C TRACH DNA GENITAL QL NAA+PROBE: NEGATIVE
CALCIUM ALBUM COR SERPL-MCNC: 8.8 MG/DL (ref 8.5–10.5)
CALCIUM SERPL-MCNC: 9 MG/DL (ref 8.5–10.5)
CANDIDA DNA VAG QL PROBE+SIG AMP: NEGATIVE
CHLORIDE SERPL-SCNC: 107 MMOL/L (ref 96–112)
CO2 SERPL-SCNC: 22 MMOL/L (ref 20–33)
COLOR UR: YELLOW
CREAT SERPL-MCNC: 0.67 MG/DL (ref 0.5–1.4)
EOSINOPHIL # BLD AUTO: 0.03 K/UL (ref 0–0.51)
EOSINOPHIL NFR BLD: 0.4 % (ref 0–6.9)
EPI CELLS #/AREA URNS HPF: ABNORMAL /HPF
ERYTHROCYTE [DISTWIDTH] IN BLOOD BY AUTOMATED COUNT: 42.1 FL (ref 35.9–50)
G VAGINALIS DNA VAG QL PROBE+SIG AMP: POSITIVE
GFR SERPLBLD CREATININE-BSD FMLA CKD-EPI: 112 ML/MIN/1.73 M 2
GLOBULIN SER CALC-MCNC: 2.8 G/DL (ref 1.9–3.5)
GLUCOSE SERPL-MCNC: 101 MG/DL (ref 65–99)
GLUCOSE UR STRIP.AUTO-MCNC: NEGATIVE MG/DL
HCT VFR BLD AUTO: 36.1 % (ref 37–47)
HGB BLD-MCNC: 12.4 G/DL (ref 12–16)
HYALINE CASTS #/AREA URNS LPF: ABNORMAL /LPF
IMM GRANULOCYTES # BLD AUTO: 0.02 K/UL (ref 0–0.11)
IMM GRANULOCYTES NFR BLD AUTO: 0.2 % (ref 0–0.9)
KETONES UR STRIP.AUTO-MCNC: NEGATIVE MG/DL
LEUKOCYTE ESTERASE UR QL STRIP.AUTO: ABNORMAL
LYMPHOCYTES # BLD AUTO: 3.18 K/UL (ref 1–4.8)
LYMPHOCYTES NFR BLD: 38.4 % (ref 22–41)
MCH RBC QN AUTO: 31.1 PG (ref 27–33)
MCHC RBC AUTO-ENTMCNC: 34.3 G/DL (ref 32.2–35.5)
MCV RBC AUTO: 90.5 FL (ref 81.4–97.8)
MICRO URNS: ABNORMAL
MONOCYTES # BLD AUTO: 0.51 K/UL (ref 0–0.85)
MONOCYTES NFR BLD AUTO: 6.2 % (ref 0–13.4)
N GONORRHOEA DNA GENITAL QL NAA+PROBE: NEGATIVE
NEUTROPHILS # BLD AUTO: 4.51 K/UL (ref 1.82–7.42)
NEUTROPHILS NFR BLD: 54.3 % (ref 44–72)
NITRITE UR QL STRIP.AUTO: NEGATIVE
NRBC # BLD AUTO: 0 K/UL
NRBC BLD-RTO: 0 /100 WBC (ref 0–0.2)
NUMBER OF RH DOSES IND 8505RD: NORMAL
PH UR STRIP.AUTO: 5.5 [PH] (ref 5–8)
PLATELET # BLD AUTO: 250 K/UL (ref 164–446)
PMV BLD AUTO: 10.1 FL (ref 9–12.9)
POTASSIUM SERPL-SCNC: 3.7 MMOL/L (ref 3.6–5.5)
PROT SERPL-MCNC: 7.1 G/DL (ref 6–8.2)
PROT UR QL STRIP: NEGATIVE MG/DL
RBC # BLD AUTO: 3.99 M/UL (ref 4.2–5.4)
RBC # URNS HPF: ABNORMAL /HPF
RBC UR QL AUTO: ABNORMAL
RH BLD: NORMAL
SODIUM SERPL-SCNC: 140 MMOL/L (ref 135–145)
SP GR UR STRIP.AUTO: 1.01
SPECIMEN SOURCE: NORMAL
T VAGINALIS DNA VAG QL PROBE+SIG AMP: NEGATIVE
UROBILINOGEN UR STRIP.AUTO-MCNC: 0.2 MG/DL
WBC # BLD AUTO: 8.3 K/UL (ref 4.8–10.8)
WBC #/AREA URNS HPF: ABNORMAL /HPF

## 2023-07-15 PROCEDURE — 76801 OB US < 14 WKS SINGLE FETUS: CPT

## 2023-07-15 PROCEDURE — 700102 HCHG RX REV CODE 250 W/ 637 OVERRIDE(OP): Performed by: EMERGENCY MEDICINE

## 2023-07-15 PROCEDURE — 81001 URINALYSIS AUTO W/SCOPE: CPT

## 2023-07-15 PROCEDURE — 87480 CANDIDA DNA DIR PROBE: CPT

## 2023-07-15 PROCEDURE — 86901 BLOOD TYPING SEROLOGIC RH(D): CPT

## 2023-07-15 PROCEDURE — 85025 COMPLETE CBC W/AUTO DIFF WBC: CPT

## 2023-07-15 PROCEDURE — 87510 GARDNER VAG DNA DIR PROBE: CPT

## 2023-07-15 PROCEDURE — A9270 NON-COVERED ITEM OR SERVICE: HCPCS | Performed by: EMERGENCY MEDICINE

## 2023-07-15 PROCEDURE — 87491 CHLMYD TRACH DNA AMP PROBE: CPT

## 2023-07-15 PROCEDURE — 36415 COLL VENOUS BLD VENIPUNCTURE: CPT

## 2023-07-15 PROCEDURE — 99285 EMERGENCY DEPT VISIT HI MDM: CPT

## 2023-07-15 PROCEDURE — 84702 CHORIONIC GONADOTROPIN TEST: CPT

## 2023-07-15 PROCEDURE — 87660 TRICHOMONAS VAGIN DIR PROBE: CPT

## 2023-07-15 PROCEDURE — 80053 COMPREHEN METABOLIC PANEL: CPT

## 2023-07-15 PROCEDURE — 87591 N.GONORRHOEAE DNA AMP PROB: CPT

## 2023-07-15 RX ORDER — LORATADINE 10 MG/1
10 TABLET ORAL DAILY
Qty: 30 TABLET | Refills: 0 | Status: SHIPPED | OUTPATIENT
Start: 2023-07-15

## 2023-07-15 RX ORDER — METRONIDAZOLE 500 MG/1
500 TABLET ORAL ONCE
Status: COMPLETED | OUTPATIENT
Start: 2023-07-15 | End: 2023-07-15

## 2023-07-15 RX ORDER — METRONIDAZOLE 500 MG/1
500 TABLET ORAL 2 TIMES DAILY
Qty: 14 TABLET | Refills: 0 | Status: ACTIVE | OUTPATIENT
Start: 2023-07-15 | End: 2023-07-22

## 2023-07-15 RX ORDER — LORATADINE 10 MG/1
10 TABLET ORAL ONCE
Status: COMPLETED | OUTPATIENT
Start: 2023-07-15 | End: 2023-07-15

## 2023-07-15 RX ADMIN — LORATADINE 10 MG: 10 TABLET ORAL at 18:54

## 2023-07-15 RX ADMIN — METRONIDAZOLE 500 MG: 500 TABLET ORAL at 22:59

## 2023-07-15 ASSESSMENT — FIBROSIS 4 INDEX: FIB4 SCORE: 1.48

## 2023-07-16 NOTE — ED NOTES
.DC home with written and verbal instructions regarding f/u, activity and RX.  Verbalized understanding, ambulated out.

## 2023-07-16 NOTE — ED TRIAGE NOTES
Pt to triage .  Chief Complaint   Patient presents with    Rash     Pt c/o rash to body     Vaginal Bleeding    Pregnancy     Pt c/o vaginal bleeding, positive pregnancy test. Pt had BHCG and u/s told to come back for repeat BHCG

## 2023-07-16 NOTE — ED PROVIDER NOTES
ED Provider Note    CHIEF COMPLAINT  Chief Complaint   Patient presents with    Rash     Pt c/o rash to body     Vaginal Bleeding    Pregnancy     Pt c/o vaginal bleeding, positive pregnancy test. Pt had BHCG and u/s told to come back for repeat BHCG         HPI    Primary care provider: Pcp Pt States None   History obtained from: Patient,  and video   History limited by: None     Jaylene Deleon is a 42 y.o. female who presents to the ED with  complaining of very light vaginal spotting with positive pregnancy.  Patient was seen in this ED 2 days ago for vaginal bleeding with pregnancy with quantitative hCG 343 at that time and ultrasound without IUP.  She reports an episode of left lower quadrant abdominal pain last night but currently has no pain.  She also developed itchy rash yesterday.  She denies any known triggers or potential exposures.  She denies fever/chills/chest pain/shortness of breath or difficulty breathing/nausea/vomiting/diarrhea/constipation/dysuria/edema.  No history of sexually transmitted infections.  Patient unsure of her blood type but record review shows she is Rh+.  Patient is currently     REVIEW OF SYSTEMS  Please see HPI for pertinent positives/negatives.  All other systems reviewed and are negative.     PAST MEDICAL HISTORY  Past Medical History:   Diagnosis Date    Umbilical hernia 2009        SURGICAL HISTORY  Past Surgical History:   Procedure Laterality Date    MARYELLEN BY LAPAROSCOPY  10/16/2011    Performed by SANDHYA CAMARENA at SURGERY [facility] ORS    UMBILICAL HERNIA REPAIR  10/16/2011    Performed by SANDHYA CAMARENA at SURGERY [facility] ORS    HERNIA REPAIR          SOCIAL HISTORY  Social History     Tobacco Use    Smoking status: Never    Smokeless tobacco: Not on file   Vaping Use    Vaping Use: Never used   Substance and Sexual Activity    Alcohol use: Yes      Comment: Soccially only but not during pregnancy    Drug use: No    Sexual activity: Not Currently     Partners: Male     Comment: none. Unplanned pregnancy         FAMILY HISTORY  Family History   Problem Relation Age of Onset    Hypertension Mother         CURRENT MEDICATIONS  Home Medications       Reviewed by Jennifer Burks R.N. (Registered Nurse) on 07/15/23 at 1715  Med List Status: <None>     Medication Last Dose Status   ibuprofen (MOTRIN) 800 MG Tab  Active   naproxen (NAPROSYN) 500 MG Tab  Active   Prenatal MV-Min-Fe Fum-FA-DHA (PRENATAL 1 PO)  Active                     ALLERGIES  Allergies   Allergen Reactions    Nkda [No Known Drug Allergy]         PHYSICAL EXAM  VITAL SIGNS: /68   Pulse 62   Temp 36.6 °C (97.9 °F) (Tympanic)   Resp 16   Wt 80.4 kg (177 lb 4 oz)   LMP 05/17/2023 (Approximate)   SpO2 98%   BMI 27.76 kg/m²  @KAITLIN[[id number]::@     Pulse ox interpretation: 99% I interpret this pulse ox as normal     Constitutional: Well developed, well nourished, alert in no apparent distress, nontoxic appearance    HENT: No external signs of trauma, normocephalic, oropharynx moist and clear   Eyes: PERRL, conjunctiva without erythema, no discharge, no icterus    Neck: Soft and supple, trachea midline, no stridor, no tenderness, no LAD, good ROM    Cardiovascular: Regular rate and rhythm, no murmurs/rubs/gallops, strong distal pulses and good perfusion    Thorax & Lungs: No respiratory distress, CTAB    Abdomen: Soft, nontender, nondistended, no guarding, no rebound, normal BS    : Female chaperon present for exam.  NEFG, vaginal canal with small amount of blood without discharge/FB, cervix closed, no CMT, mild uterine TTP, mild bilateral AT  Back: No CVAT     Extremities: No cyanosis, no edema, no gross deformity, good ROM, intact distal pulses with brisk cap refill    Skin: Warm, dry, no pallor/cyanosis, scattered slightly raised and mildly red papular lesions of various sizes and shapes on  trunk and extremities that blanches with pressure and without tenderness to palpation, no rash noted on palms or soles, no mucosal involvement noted, no petechiae/purpura/blisters/vesicles/ulceration/drainage/streaking/warmth/crepitus/fluctuance  Neuro: A/O times 3, no focal deficits noted    Psychiatric: Cooperative, normal mood and affect, normal judgement, appropriate for clinical situation        DIAGNOSTIC STUDIES / PROCEDURES        LABS  All labs reviewed by me.     Results for orders placed or performed during the hospital encounter of 07/15/23   CBC WITH DIFFERENTIAL   Result Value Ref Range    WBC 8.3 4.8 - 10.8 K/uL    RBC 3.99 (L) 4.20 - 5.40 M/uL    Hemoglobin 12.4 12.0 - 16.0 g/dL    Hematocrit 36.1 (L) 37.0 - 47.0 %    MCV 90.5 81.4 - 97.8 fL    MCH 31.1 27.0 - 33.0 pg    MCHC 34.3 32.2 - 35.5 g/dL    RDW 42.1 35.9 - 50.0 fL    Platelet Count 250 164 - 446 K/uL    MPV 10.1 9.0 - 12.9 fL    Neutrophils-Polys 54.30 44.00 - 72.00 %    Lymphocytes 38.40 22.00 - 41.00 %    Monocytes 6.20 0.00 - 13.40 %    Eosinophils 0.40 0.00 - 6.90 %    Basophils 0.50 0.00 - 1.80 %    Immature Granulocytes 0.20 0.00 - 0.90 %    Nucleated RBC 0.00 0.00 - 0.20 /100 WBC    Neutrophils (Absolute) 4.51 1.82 - 7.42 K/uL    Lymphs (Absolute) 3.18 1.00 - 4.80 K/uL    Monos (Absolute) 0.51 0.00 - 0.85 K/uL    Eos (Absolute) 0.03 0.00 - 0.51 K/uL    Baso (Absolute) 0.04 0.00 - 0.12 K/uL    Immature Granulocytes (abs) 0.02 0.00 - 0.11 K/uL    NRBC (Absolute) 0.00 K/uL   COMP METABOLIC PANEL   Result Value Ref Range    Sodium 140 135 - 145 mmol/L    Potassium 3.7 3.6 - 5.5 mmol/L    Chloride 107 96 - 112 mmol/L    Co2 22 20 - 33 mmol/L    Anion Gap 11.0 7.0 - 16.0    Glucose 101 (H) 65 - 99 mg/dL    Bun 10 8 - 22 mg/dL    Creatinine 0.67 0.50 - 1.40 mg/dL    Calcium 9.0 8.5 - 10.5 mg/dL    AST(SGOT) 43 12 - 45 U/L    ALT(SGPT) 44 2 - 50 U/L    Alkaline Phosphatase 89 30 - 99 U/L    Total Bilirubin 0.5 0.1 - 1.5 mg/dL    Albumin 4.3  3.2 - 4.9 g/dL    Total Protein 7.1 6.0 - 8.2 g/dL    Globulin 2.8 1.9 - 3.5 g/dL    A-G Ratio 1.5 g/dL   RH TYPE FOR RHOGAM FROM E.D.   Result Value Ref Range    Emergency Department Rh Typing POS     Number Of Rh Doses Indicated ZERO    HCG QUANTITATIVE   Result Value Ref Range    Bhcg 128.0 (H) 0.0 - 5.0 mIU/mL   URINALYSIS,CULTURE IF INDICATED    Specimen: Urine   Result Value Ref Range    Color Yellow     Character Clear     Specific Gravity 1.015 <1.035    Ph 5.5 5.0 - 8.0    Glucose Negative Negative mg/dL    Ketones Negative Negative mg/dL    Protein Negative Negative mg/dL    Bilirubin Negative Negative    Urobilinogen, Urine 0.2 Negative    Nitrite Negative Negative    Leukocyte Esterase Small (A) Negative    Occult Blood Moderate (A) Negative    Micro Urine Req Microscopic    Chlamydia/GC, PCR (Genital/Anal swab)    Specimen: Genital   Result Value Ref Range    Source Vaginal    VAGINAL PATHOGENS DNA PANEL   Result Value Ref Range    Candida species DNA Probe Negative Negative    Trichamonas vaginalis DNA Probe Negative Negative    Gardnerella vaginalis DNA Probe POSITIVE (A) Negative   CORRECTED CALCIUM   Result Value Ref Range    Correct Calcium 8.8 8.5 - 10.5 mg/dL   ESTIMATED GFR   Result Value Ref Range    GFR (CKD-EPI) 112 >60 mL/min/1.73 m 2   URINE MICROSCOPIC (W/UA)   Result Value Ref Range    WBC 5-10 (A) /hpf    RBC 0-2 /hpf    Bacteria Negative None /hpf    Epithelial Cells Few /hpf    Hyaline Cast 0-2 /lpf        RADIOLOGY  I have independently interpreted the diagnostic imaging associated with this visit and am waiting the final reading from the radiologist.     US-OB 1ST TRIMESTER WITH TRANSVAGINAL (COMBO)   Final Result      No intrauterine pregnancy identified. The differential includes normal early intrauterine pregnancy. Spontaneous  or ectopic pregnancy cannot be excluded.      Continued follow-up imaging and serial beta hCG is recommended.             COURSE & MEDICAL DECISION  MAKING  Nursing notes, VS, PMSFHx reviewed in chart.     Review of past medical records shows the patient was last seen in this ED July 13, 2023 for vaginal bleeding with pregnancy and patient's quantitative hCG was 343 and ultrasound without IUP.  Patient Rh+.      Differential diagnoses considered include but are not limited to: Pregnancy, ectopic, Ab/miscarriage, fetal demise, STI, PID, cervicitis, vaginitis, allergic reaction, urticaria, contact dermatitis, viral exanthem      ED Observation Status? Yes; I am placing the patient in to an observation status due to a diagnostic uncertainty as well as therapeutic intensity. Patient placed in observation status at 5:33 PM, 7/15/2023.     Observation plan is as follows: We will obtain laboratory and imaging studies and monitor patient in the ED.    Upon Reevaluation, the patient's condition has: Remained stable and will be discharged.    Patient discharged from ED Observation status at 2245 on July 15, 2023.       INITIAL ASSESSMENT AND PLAN  Care Narrative: This is a generally healthy 42-year-old female patient who was seen in this ED 2 days ago for vaginal bleeding and quantitative hCG was 34 3 while ultrasound without evidence for IUP.  She presents today for light vaginal spotting and also noted to have itchy rash.  The rash appears to be urticarial.  Patient will be given Claritin in the ED.  We will also obtain laboratory and imaging studies for evaluation of her pregnancy and patient will be closely monitored in the ED.      Discussion of management with other QHP or appropriate source(s): OB/GYN    Escalation of care considered, and ultimately not performed: acute inpatient care management, however at this time, the patient is most appropriate for outpatient management.     Barriers to care at this time, including but not limited to: Patient does not have established PCP.     Decision tools and prescription drugs considered including, but not limited to:  Antibiotics   and Pain Medications   .    2050: D/W Dr. Chamberlain, on-call OB/GYN.  Given the findings, likely miscarriage.  She recommends outpatient follow-up within the week.      History and physical exam as above.  Patient's quantitative hCG has decreased from 343 on July 13, 2023 to 128 today.  Ultrasound without evidence for IUP.  Findings discussed with Dr. Chamberlain.  This is most likely miscarriage although ectopic is still possible.  She recommended outpatient follow-up within the week.  Patient without anemia or leukocytosis.  No significant electrolyte derangement.  No evidence for renal or hepatic dysfunction.  No overt signs of infection on UA.  Patient with positive Gardnerella and will be started on Flagyl for bacterial vaginosis.  She also has rash that appears to be hives and was treated with Claritin in the ED without significant improvement but also no worsening.  She does not have evidence for airway impairment, respiratory distress or hemodynamic instability to suggest anaphylaxis.  I discussed the findings with patient and  using video .  On recheck, patient is alert, pleasant, in no acute distress and nontoxic in appearance and without evidence for acute abdomen.  She was advised on outpatient follow-up and given return to ED precautions.  Patient verbalized understanding and agreed with plan of care with no further questions or concerns.      The patient is referred to a primary physician for blood pressure management, diabetic screening, and for all other preventative health concerns.       FINAL IMPRESSION  1. Vaginal bleeding during pregnancy Acute   2. Rash Acute   3. Bacterial vaginosis Active          DISPOSITION  Patient will be discharged home in stable condition.       FOLLOW UP  [facility].  [address] [address]  [phone]    Call in 2 days      [facility], Emergency Dept  [facility] [facility]  [address]  [phone]    If symptoms worsen         OUTPATIENT MEDICATIONS  Discharge Medication List as of 7/15/2023 10:55 PM        START taking these medications    Details   loratadine (CLARITIN) 10 MG Tab Take 1 Tablet by mouth every day., Disp-30 Tablet, R-0, Print Rx Paper      metroNIDAZOLE (FLAGYL) 500 MG Tab Take 1 Tablet by mouth 2 times a day for 7 days., Disp-14 Tablet, R-0, Print Rx Paper                Electronically signed by: Ferny Salinas D.O., 7/15/2023 5:32 PM      Portions of this record were made with voice recognition software.  Despite my review, errors may remain.  Please interpret this chart in the appropriate context.

## 2023-07-18 ENCOUNTER — GYNECOLOGY VISIT (OUTPATIENT)
Dept: OBGYN | Facility: CLINIC | Age: 43
End: 2023-07-18

## 2023-07-18 ENCOUNTER — HOSPITAL ENCOUNTER (OUTPATIENT)
Facility: MEDICAL CENTER | Age: 43
End: 2023-07-18
Attending: NURSE PRACTITIONER

## 2023-07-18 ENCOUNTER — HOSPITAL ENCOUNTER (OUTPATIENT)
Dept: LAB | Facility: MEDICAL CENTER | Age: 43
End: 2023-07-18
Attending: NURSE PRACTITIONER

## 2023-07-18 VITALS — BODY MASS INDEX: 27.72 KG/M2 | WEIGHT: 177 LBS | DIASTOLIC BLOOD PRESSURE: 60 MMHG | SYSTOLIC BLOOD PRESSURE: 90 MMHG

## 2023-07-18 DIAGNOSIS — O03.4 INCOMPLETE ABORTION: ICD-10-CM

## 2023-07-18 DIAGNOSIS — O03.4 INCOMPLETE ABORTION: Primary | ICD-10-CM

## 2023-07-18 DIAGNOSIS — Z12.4 CERVICAL CANCER SCREENING: ICD-10-CM

## 2023-07-18 PROCEDURE — 87624 HPV HI-RISK TYP POOLED RSLT: CPT

## 2023-07-18 PROCEDURE — 3074F SYST BP LT 130 MM HG: CPT | Performed by: NURSE PRACTITIONER

## 2023-07-18 PROCEDURE — 3078F DIAST BP <80 MM HG: CPT | Performed by: NURSE PRACTITIONER

## 2023-07-18 PROCEDURE — 87591 N.GONORRHOEAE DNA AMP PROB: CPT

## 2023-07-18 PROCEDURE — 84702 CHORIONIC GONADOTROPIN TEST: CPT

## 2023-07-18 PROCEDURE — 88175 CYTOPATH C/V AUTO FLUID REDO: CPT

## 2023-07-18 PROCEDURE — 36415 COLL VENOUS BLD VENIPUNCTURE: CPT

## 2023-07-18 PROCEDURE — 99214 OFFICE O/P EST MOD 30 MIN: CPT | Performed by: NURSE PRACTITIONER

## 2023-07-18 PROCEDURE — 87491 CHLMYD TRACH DNA AMP PROBE: CPT

## 2023-07-18 ASSESSMENT — FIBROSIS 4 INDEX: FIB4 SCORE: 1.06

## 2023-07-18 NOTE — PROGRESS NOTES
Patient here for a GYN follow up.   Last seen on 8/11/17  pharmacy verified.  Patient phone #: [phone] (home)   Pap: 1/5/2017 wnl   Lmp: NA  Bcm: NA  Reema: Per pt done at [facility].      Pt states she took a pregnancy test Saturday came back positive. [facility]  and [facility] a possible [facility]. She also states she has gotten a rash on right arm since Friday.

## 2023-07-18 NOTE — PROGRESS NOTES
HPI Comments:  Jaylene Oneil is a 42 y.o. y.o. female who presents for problem gyn visit.    Pt reports she had she was seen at [facility] for bleeding and was told she had a miscarriage and was referred to the ED. She was seen at [facility] ED on 2023 and again on 7/15/2023. She had  HCG x2 drawn  which showed  falling HCG levels:  : Hcg-343.0  7/15: Hcg-128.0  An US was performed with following results:  7/15/2023 7:48 PM     HISTORY/REASON FOR EXAM:  RASH, VAGINAL BLEEDING, PREGNANCY        TECHNIQUE/EXAM DESCRIPTION: First trimester OB ultrasound.  Real-time OB transabdominal and transvaginal pelvis ultrasound was performed with grey-scale, color and duplex Doppler imaging.     COMPARISON: 2023     FINDINGS:     UTERUS:  No intrauterine pregnancy identified.     Heterogeneous endometrium measuring up to 1.2 cm.     OVARIES:     The right ovary measures 2.6 x 1.9 x 2.5 cm. Doppler examination of the right ovary shows normal waveforms.     The left ovary measures 1.9 x 2.3 x 2.4 cm. Doppler examination of the left ovary shows normal waveforms.     No adnexal masses are seen.     No free fluid in the pelvis.     IMPRESSION:     No intrauterine pregnancy identified. The differential includes normal early intrauterine pregnancy. Spontaneous  or ectopic pregnancy cannot be excluded.     Pt took another UPT over the weekend and it was positive.     Patient's last menstrual period was 2023 (approximate).      Pt is sexually active with one male partner  Pt is using nothing for birth control  .  Review of Systems :  Constitutional:   ROS: Patient is feeling well.   No dyspnea or chest pain on exertion.   No Abdominal pain, change in bowel habits, black or bloody stools.   No urinary sx.   GYN ROS:no breast pain or new or enlarging lumps on self exam, no current vaginal bleeding, she complains of pain on LLQ  .   Denies breast tenderness, mass, discharge, changes in size or contour, or  abnormal cyclic discomfort.   No neurological complaints.    Past Medical History:   Diagnosis Date    Umbilical hernia 2009     Hx of  x4  Allergy:   Nkda [no known drug allergy]  Pertinent positives documented in HPI and all other systems reviewed & are negative    All PMH, PSH, allergies, social history and FH reviewed and updated today:  Past Medical History:   Diagnosis Date    Umbilical hernia 2009     Past Surgical History:   Procedure Laterality Date    MARYELLEN BY LAPAROSCOPY  10/16/2011    Performed by SANDHYA CAMARENA at SURGERY [facility] ORS    UMBILICAL HERNIA REPAIR  10/16/2011    Performed by SANDHYA CAMARENA at SURGERY [facility] ORS    HERNIA REPAIR       Nkda [no known drug allergy]  Social History     Socioeconomic History    Marital status:    Tobacco Use    Smoking status: Never   Vaping Use    Vaping Use: Never used   Substance and Sexual Activity    Alcohol use: Yes     Alcohol/week: 0.6 oz     Types: 1 Cans of beer per week     Comment: Soccially only but not during pregnancy    Drug use: No    Sexual activity: Yes     Partners: Male     Comment: none. Unplanned pregnancy      Family History   Problem Relation Age of Onset    Hypertension Mother      Medications:   Current Outpatient Medications Ordered in Epic   Medication Sig Dispense Refill    loratadine (CLARITIN) 10 MG Tab Take 1 Tablet by mouth every day. 30 Tablet 0    metroNIDAZOLE (FLAGYL) 500 MG Tab Take 1 Tablet by mouth 2 times a day for 7 days. 14 Tablet 0    naproxen (NAPROSYN) 500 MG Tab Take 1 Tab by mouth 2 times a day, with meals. 20 Tab 0    ibuprofen (MOTRIN) 800 MG Tab Take 1 Tab by mouth every 6 hours as needed (cramping after delivery; do not give if patient is receiving ketorolac (Toradol)). 30 Tab 2    Prenatal MV-Min-Fe Fum-FA-DHA (PRENATAL 1 PO) Take  by mouth. (Patient not taking: Reported on 2023)       No current [facility]-ordered facility-administered medications on file.           Objective:   Vital measurements:  BP 90/60 (BP Location: Right arm, Patient Position: Sitting, BP Cuff Size: Adult)   Wt 177 lb   Body mass index is 27.72 kg/m². (Goal BM I>18 <25)    Physical Exam   Nursing note and vitals reviewed.  Constitutional: She is oriented to person, place, and time. She appears well-developed and well-nourished. No distress.     Abdominal: Soft. Bowel sounds are normal. She exhibits no distension and no mass. Tenderness on LLQ. She has no rebound and no guarding.     Breast:  Deferred. Normal mammogram at [facility]     Genitourinary:  Pelvic exam was performed with patient supine.  External genitalia with no abnormal pigmentation, labial fusion,rash, tenderness, lesion or injury to the labia bilaterally.  Vagina is moist with no lesions, foul discharge, erythema, tenderness or bleeding. No foreign body around the vagina or signs of injury.   Cervix exhibits no motion tenderness, no discharge and no friability.   Uterus is midline not deviated, not enlarged, not fixed and not tender.  Right adnexum displays no mass, no tenderness and no fullness. Left adnexum displays no mass, no tenderness and no fullness.     Neurological: She is alert and oriented to person, place, and time. She exhibits normal muscle tone.     Skin: Skin is warm and dry. No rash noted. She is not diaphoretic. No erythema. No pallor.     Psychiatric: She has a normal mood and affect. Her behavior is normal. Judgment and thought content normal.        Assessment:     1. Incomplete   HCG QUANTITATIVE      2. Cervical cancer screening  THINPREP PAP W/HPV AND CTNG            Plan:   Gyn and pap exam performed   Labs: HCG  Counseling: If experiences severe pain, bleeding, fever, go to ED  Encourage exercise and proper diet.  Rx for Micronor if HCG shows further decline  Pt to follow up annually or prn    No follow-ups on file.

## 2023-07-19 LAB
B-HCG SERPL-ACNC: 47.4 MIU/ML (ref 0–5)
C TRACH DNA GENITAL QL NAA+PROBE: NEGATIVE
CYTOLOGY REG CYTOL: NORMAL
HPV HR 12 DNA CVX QL NAA+PROBE: NEGATIVE
HPV16 DNA SPEC QL NAA+PROBE: NEGATIVE
HPV18 DNA SPEC QL NAA+PROBE: NEGATIVE
N GONORRHOEA DNA GENITAL QL NAA+PROBE: NEGATIVE
SPECIMEN SOURCE: NORMAL
SPECIMEN SOURCE: NORMAL

## 2023-07-19 RX ORDER — ACETAMINOPHEN AND CODEINE PHOSPHATE 120; 12 MG/5ML; MG/5ML
1 SOLUTION ORAL DAILY
Qty: 28 TABLET | Refills: 11 | Status: SHIPPED | OUTPATIENT
Start: 2023-07-19

## 2023-07-20 ENCOUNTER — TELEPHONE (OUTPATIENT)
Dept: OBGYN | Facility: CLINIC | Age: 43
End: 2023-07-20

## 2023-07-20 NOTE — TELEPHONE ENCOUNTER
----- Message from ADAM Spears sent at 7/19/2023  1:30 PM PDT -----  Please inform pt that her Hcg levels have dropped, and that she can be reassured that she did have a miscarriage. I will sent in an Rx for birth control pills as per her request. Please advise her to go to ED if any more bleeding, pain or fever.      Called pt and informed as above. Pt agreed and asked what she can have for pain. Pt states that when she had the SAB she passed a big blood clot. Pt reports pain on left side. Consulted with Tere WOLFE CNM and advised for pt to take 800mg of Ibuprofen and wait 2hr, if pt continue to have pain, pt to go to ED since US report cannot exclude ectopic pregnancy. Pt informed. Pt agreed and verbalized understanding.

## 2024-04-20 ENCOUNTER — HOSPITAL ENCOUNTER (EMERGENCY)
Facility: MEDICAL CENTER | Age: 44
End: 2024-04-20
Attending: EMERGENCY MEDICINE

## 2024-04-20 ENCOUNTER — APPOINTMENT (OUTPATIENT)
Dept: RADIOLOGY | Facility: MEDICAL CENTER | Age: 44
End: 2024-04-20
Attending: EMERGENCY MEDICINE

## 2024-04-20 VITALS
WEIGHT: 177.69 LBS | SYSTOLIC BLOOD PRESSURE: 117 MMHG | HEART RATE: 63 BPM | DIASTOLIC BLOOD PRESSURE: 57 MMHG | HEIGHT: 67 IN | RESPIRATION RATE: 16 BRPM | BODY MASS INDEX: 27.89 KG/M2 | OXYGEN SATURATION: 98 % | TEMPERATURE: 98 F

## 2024-04-20 DIAGNOSIS — R10.84 GENERALIZED ABDOMINAL PAIN: ICD-10-CM

## 2024-04-20 DIAGNOSIS — Z3A.08 PREGNANCY WITH 8 COMPLETED WEEKS GESTATION: ICD-10-CM

## 2024-04-20 LAB
ALBUMIN SERPL BCP-MCNC: 4.2 G/DL (ref 3.2–4.9)
ALBUMIN/GLOB SERPL: 1.5 G/DL
ALP SERPL-CCNC: 73 U/L (ref 30–99)
ALT SERPL-CCNC: 13 U/L (ref 2–50)
ANION GAP SERPL CALC-SCNC: 10 MMOL/L (ref 7–16)
APPEARANCE UR: ABNORMAL
AST SERPL-CCNC: 19 U/L (ref 12–45)
B-HCG SERPL-ACNC: ABNORMAL MIU/ML (ref 0–5)
BACTERIA #/AREA URNS HPF: ABNORMAL /HPF
BASOPHILS # BLD AUTO: 0.3 % (ref 0–1.8)
BASOPHILS # BLD: 0.03 K/UL (ref 0–0.12)
BILIRUB SERPL-MCNC: 0.7 MG/DL (ref 0.1–1.5)
BILIRUB UR QL STRIP.AUTO: NEGATIVE
BUN SERPL-MCNC: 5 MG/DL (ref 8–22)
CALCIUM ALBUM COR SERPL-MCNC: 8.9 MG/DL (ref 8.5–10.5)
CALCIUM SERPL-MCNC: 9.1 MG/DL (ref 8.5–10.5)
CANDIDA DNA VAG QL PROBE+SIG AMP: NEGATIVE
CHLORIDE SERPL-SCNC: 105 MMOL/L (ref 96–112)
CO2 SERPL-SCNC: 20 MMOL/L (ref 20–33)
COLOR UR: YELLOW
CREAT SERPL-MCNC: 0.57 MG/DL (ref 0.5–1.4)
EOSINOPHIL # BLD AUTO: 0.02 K/UL (ref 0–0.51)
EOSINOPHIL NFR BLD: 0.2 % (ref 0–6.9)
EPI CELLS #/AREA URNS HPF: ABNORMAL /HPF
ERYTHROCYTE [DISTWIDTH] IN BLOOD BY AUTOMATED COUNT: 42 FL (ref 35.9–50)
G VAGINALIS DNA VAG QL PROBE+SIG AMP: NEGATIVE
GFR SERPLBLD CREATININE-BSD FMLA CKD-EPI: 116 ML/MIN/1.73 M 2
GLOBULIN SER CALC-MCNC: 2.8 G/DL (ref 1.9–3.5)
GLUCOSE SERPL-MCNC: 92 MG/DL (ref 65–99)
GLUCOSE UR STRIP.AUTO-MCNC: NEGATIVE MG/DL
HCT VFR BLD AUTO: 38.1 % (ref 37–47)
HGB BLD-MCNC: 13 G/DL (ref 12–16)
HYALINE CASTS #/AREA URNS LPF: ABNORMAL /LPF
IMM GRANULOCYTES # BLD AUTO: 0.02 K/UL (ref 0–0.11)
IMM GRANULOCYTES NFR BLD AUTO: 0.2 % (ref 0–0.9)
KETONES UR STRIP.AUTO-MCNC: NEGATIVE MG/DL
LEUKOCYTE ESTERASE UR QL STRIP.AUTO: ABNORMAL
LIPASE SERPL-CCNC: 26 U/L (ref 11–82)
LYMPHOCYTES # BLD AUTO: 2.72 K/UL (ref 1–4.8)
LYMPHOCYTES NFR BLD: 27.7 % (ref 22–41)
MCH RBC QN AUTO: 30.7 PG (ref 27–33)
MCHC RBC AUTO-ENTMCNC: 34.1 G/DL (ref 32.2–35.5)
MCV RBC AUTO: 90.1 FL (ref 81.4–97.8)
MICRO URNS: ABNORMAL
MONOCYTES # BLD AUTO: 0.58 K/UL (ref 0–0.85)
MONOCYTES NFR BLD AUTO: 5.9 % (ref 0–13.4)
NEUTROPHILS # BLD AUTO: 6.44 K/UL (ref 1.82–7.42)
NEUTROPHILS NFR BLD: 65.7 % (ref 44–72)
NITRITE UR QL STRIP.AUTO: NEGATIVE
NRBC # BLD AUTO: 0 K/UL
NRBC BLD-RTO: 0 /100 WBC (ref 0–0.2)
NUMBER OF RH DOSES IND 8505RD: NORMAL
PH UR STRIP.AUTO: 5.5 [PH] (ref 5–8)
PLATELET # BLD AUTO: 248 K/UL (ref 164–446)
PMV BLD AUTO: 10.4 FL (ref 9–12.9)
POTASSIUM SERPL-SCNC: 4 MMOL/L (ref 3.6–5.5)
PROT SERPL-MCNC: 7 G/DL (ref 6–8.2)
PROT UR QL STRIP: NEGATIVE MG/DL
RBC # BLD AUTO: 4.23 M/UL (ref 4.2–5.4)
RBC # URNS HPF: ABNORMAL /HPF
RBC UR QL AUTO: NEGATIVE
RH BLD: NORMAL
SODIUM SERPL-SCNC: 135 MMOL/L (ref 135–145)
SP GR UR STRIP.AUTO: 1.02
T VAGINALIS DNA VAG QL PROBE+SIG AMP: NEGATIVE
UROBILINOGEN UR STRIP.AUTO-MCNC: 1 MG/DL
WBC # BLD AUTO: 9.8 K/UL (ref 4.8–10.8)
WBC #/AREA URNS HPF: ABNORMAL /HPF

## 2024-04-20 PROCEDURE — 87510 GARDNER VAG DNA DIR PROBE: CPT

## 2024-04-20 PROCEDURE — 87591 N.GONORRHOEAE DNA AMP PROB: CPT

## 2024-04-20 PROCEDURE — 87660 TRICHOMONAS VAGIN DIR PROBE: CPT

## 2024-04-20 PROCEDURE — 85025 COMPLETE CBC W/AUTO DIFF WBC: CPT

## 2024-04-20 PROCEDURE — 84702 CHORIONIC GONADOTROPIN TEST: CPT

## 2024-04-20 PROCEDURE — 81001 URINALYSIS AUTO W/SCOPE: CPT

## 2024-04-20 PROCEDURE — 83690 ASSAY OF LIPASE: CPT

## 2024-04-20 PROCEDURE — 87086 URINE CULTURE/COLONY COUNT: CPT

## 2024-04-20 PROCEDURE — 99284 EMERGENCY DEPT VISIT MOD MDM: CPT

## 2024-04-20 PROCEDURE — 87491 CHLMYD TRACH DNA AMP PROBE: CPT

## 2024-04-20 PROCEDURE — 80053 COMPREHEN METABOLIC PANEL: CPT

## 2024-04-20 PROCEDURE — 36415 COLL VENOUS BLD VENIPUNCTURE: CPT

## 2024-04-20 PROCEDURE — 76817 TRANSVAGINAL US OBSTETRIC: CPT

## 2024-04-20 PROCEDURE — 87480 CANDIDA DNA DIR PROBE: CPT

## 2024-04-20 PROCEDURE — 86901 BLOOD TYPING SEROLOGIC RH(D): CPT

## 2024-04-20 ASSESSMENT — LIFESTYLE VARIABLES
HAVE YOU EVER FELT YOU SHOULD CUT DOWN ON YOUR DRINKING: NO
TOTAL SCORE: 0
TOTAL SCORE: 0
HAVE PEOPLE ANNOYED YOU BY CRITICIZING YOUR DRINKING: NO
TOTAL SCORE: 0
CONSUMPTION TOTAL: INCOMPLETE
EVER FELT BAD OR GUILTY ABOUT YOUR DRINKING: NO
EVER HAD A DRINK FIRST THING IN THE MORNING TO STEADY YOUR NERVES TO GET RID OF A HANGOVER: NO
DO YOU DRINK ALCOHOL: NO

## 2024-04-20 ASSESSMENT — FIBROSIS 4 INDEX: FIB4 SCORE: 1.09

## 2024-04-20 NOTE — ED PROVIDER NOTES
ED Provider Note    CHIEF COMPLAINT  Chief Complaint   Patient presents with    Abdominal Pain     The pt reports generalized abd pain that started 5 days ago. The pt reports 8 weeks pregnant, . The pt went to clinic for routine care and was sent here for blood in urine. The pt reports yellow urine at home. The pt reports diarrhea yesterday       EXTERNAL RECORDS REVIEWED  Other none    HPI/ROS  LIMITATION TO HISTORY   Select: : None    OUTSIDE HISTORIAN(S):  Family patient's daughter is at the bedside    Jaylene Oneil is a 43 y.o. G5, P3 Ab1 female with LMP 2024 who is 8 weeks pregnant who presents for abdominal pain.    Patient states she went to the [facility] today.  They found blood in her urine and wanted the patient to be evaluated further in the ER for this.      She had a positive pregnancy test at home 3 weeks ago.  For the last 4 days she has had some suprapubic pain radiating around the abdomen accompanied by nausea.  Her left side also hurts when she lays down on it.      She denies fever, chills, vaginal bleeding, vomiting, urinary symptoms.  Patient has had no abdominal surgeries.      PAST MEDICAL HISTORY   has a past medical history of Umbilical hernia (2009).    SURGICAL HISTORY   has a past surgical history that includes anjelica by laparoscopy (10/16/2011); umbilical hernia repair (10/16/2011); and hernia repair.    FAMILY HISTORY  Family History   Problem Relation Age of Onset    Hypertension Mother        SOCIAL HISTORY  Social History     Tobacco Use    Smoking status: Never    Smokeless tobacco: Never   Vaping Use    Vaping Use: Never used   Substance and Sexual Activity    Alcohol use: Not Currently     Alcohol/week: 0.6 oz     Types: 1 Cans of beer per week     Comment: Soccially only but not during pregnancy    Drug use: No    Sexual activity: Yes     Partners: Male     Comment: none. Unplanned pregnancy        CURRENT MEDICATIONS  Home Medications       Reviewed by  "Alphonso Costa R.N. (Registered Nurse) on 04/20/24 at 1241  Med List Status: Partial     Medication Last Dose Status   ibuprofen (MOTRIN) 800 MG Tab  Active   loratadine (CLARITIN) 10 MG Tab  Active   naproxen (NAPROSYN) 500 MG Tab  Active   norethindrone (MICRONOR) 0.35 MG tablet  Active   Prenatal MV-Min-Fe Fum-FA-DHA (PRENATAL 1 PO)  Active                    ALLERGIES  No Known Allergies    PHYSICAL EXAM  VITAL SIGNS: /56   Pulse 64   Temp 36.7 °C (98 °F) (Temporal)   Resp 16   Ht 1.702 m (5' 7\")   Wt 80.6 kg (177 lb 11.1 oz)   LMP 05/17/2023 (Approximate)   SpO2 98%   BMI 27.83 kg/m²    General:  WDWN female, nontoxic appearing in NAD; A+Ox3; V/S as above; afebrile  Skin: warm and dry; good color; no rash  HEENT: NCAT; EOMs intact; PERRL; no scleral icterus   Neck: FROM  Cardiovascular: Regular heart rate and rhythm.  No murmurs, rubs, or gallops; pulses 2+ bilaterally radially  Lungs: No respiratory distress or tachypnea; Clear to auscultation with good air movement bilaterally.  No wheezes, rhonchi, or rales.   Abdomen: soft; NTND; no rebound, guarding, or rigidity.  No organomegaly or pulsatile mass   Pelvic: (Performed with female RN chaperone) normal external female genitalia; scant amount of yellowish-white vaginal discharge; No cervical motion tenderness, cervix is closed, no blood  Extremities: REILLY x 4; no e/o trauma; no pedal edema; neg Erna's  Neurologic: CNs III-XII grossly intact; speech clear; distal sensation intact; strength 5/5 UE/LEs  Psychiatric: Appropriate affect, normal mood      EKG/LABS  Results for orders placed or performed during the hospital encounter of 04/20/24   CBC WITH DIFFERENTIAL   Result Value Ref Range    WBC 9.8 4.8 - 10.8 K/uL    RBC 4.23 4.20 - 5.40 M/uL    Hemoglobin 13.0 12.0 - 16.0 g/dL    Hematocrit 38.1 37.0 - 47.0 %    MCV 90.1 81.4 - 97.8 fL    MCH 30.7 27.0 - 33.0 pg    MCHC 34.1 32.2 - 35.5 g/dL    RDW 42.0 35.9 - 50.0 fL    Platelet Count 248 164 - " 446 K/uL    MPV 10.4 9.0 - 12.9 fL    Neutrophils-Polys 65.70 44.00 - 72.00 %    Lymphocytes 27.70 22.00 - 41.00 %    Monocytes 5.90 0.00 - 13.40 %    Eosinophils 0.20 0.00 - 6.90 %    Basophils 0.30 0.00 - 1.80 %    Immature Granulocytes 0.20 0.00 - 0.90 %    Nucleated RBC 0.00 0.00 - 0.20 /100 WBC    Neutrophils (Absolute) 6.44 1.82 - 7.42 K/uL    Lymphs (Absolute) 2.72 1.00 - 4.80 K/uL    Monos (Absolute) 0.58 0.00 - 0.85 K/uL    Eos (Absolute) 0.02 0.00 - 0.51 K/uL    Baso (Absolute) 0.03 0.00 - 0.12 K/uL    Immature Granulocytes (abs) 0.02 0.00 - 0.11 K/uL    NRBC (Absolute) 0.00 K/uL   COMP METABOLIC PANEL   Result Value Ref Range    Sodium 135 135 - 145 mmol/L    Potassium 4.0 3.6 - 5.5 mmol/L    Chloride 105 96 - 112 mmol/L    Co2 20 20 - 33 mmol/L    Anion Gap 10.0 7.0 - 16.0    Glucose 92 65 - 99 mg/dL    Bun 5 (L) 8 - 22 mg/dL    Creatinine 0.57 0.50 - 1.40 mg/dL    Calcium 9.1 8.5 - 10.5 mg/dL    Correct Calcium 8.9 8.5 - 10.5 mg/dL    AST(SGOT) 19 12 - 45 U/L    ALT(SGPT) 13 2 - 50 U/L    Alkaline Phosphatase 73 30 - 99 U/L    Total Bilirubin 0.7 0.1 - 1.5 mg/dL    Albumin 4.2 3.2 - 4.9 g/dL    Total Protein 7.0 6.0 - 8.2 g/dL    Globulin 2.8 1.9 - 3.5 g/dL    A-G Ratio 1.5 g/dL   LIPASE   Result Value Ref Range    Lipase 26 11 - 82 U/L   HCG QUANTITATIVE   Result Value Ref Range    Bhcg 72316.0 (H) 0.0 - 5.0 mIU/mL   URINALYSIS,CULTURE IF INDICATED    Specimen: Urine   Result Value Ref Range    Color Yellow     Character Cloudy (A)     Specific Gravity 1.025 <1.035    Ph 5.5 5.0 - 8.0    Glucose Negative Negative mg/dL    Ketones Negative Negative mg/dL    Protein Negative Negative mg/dL    Bilirubin Negative Negative    Urobilinogen, Urine 1.0 Negative    Nitrite Negative Negative    Leukocyte Esterase Trace (A) Negative    Occult Blood Negative Negative    Micro Urine Req Microscopic    RH Type for Rhogam from E.D.   Result Value Ref Range    Emergency Department Rh Typing POS     Number Of Rh Doses  Indicated ZERO    URINE MICROSCOPIC (W/UA)   Result Value Ref Range    WBC 2-5 /hpf    RBC 0-2 /hpf    Bacteria Moderate (A) None /hpf    Epithelial Cells Few /hpf    Hyaline Cast 0-2 /lpf   ESTIMATED GFR   Result Value Ref Range    GFR (CKD-EPI) 116 >60 mL/min/1.73 m 2   Chlamydia/GC, PCR (Genital/Anal swab)    Specimen: Genital   Result Value Ref Range    Source Vaginal        I have independently interpreted this EKG    RADIOLOGY/PROCEDURES   Radiologist interpretation:  US-OB 1ST TRIMESTER WITH TRANSVAGINAL (COMBO)   Final Result      1.  Single living intrauterine pregnancy at 8 weeks 1 day gestation by ultrasound.      2.  Second hypoechoic focus seen immediately adjacent to and inferior to the gestational sac which measures 1 cm in size possibly representing a second empty gestational sac or unusual appearance for chronic subchorionic hemorrhage.          COURSE & MEDICAL DECISION MAKING    ASSESSMENT, COURSE AND PLAN  Care Narrative: This is a 43-year-old G5, P3 Ab1 who had a positive home pregnancy test 3 weeks ago.  She has not had an ultrasound yet.  She presented to urgent care today with complaints of abdominal pain.  She was found to have blood in her urine and was sent here for further workup.  Patient is afebrile and nontoxic-appearing.  She has a soft, nontender, nonsurgical abdomen.  Pelvic exam demonstrates no concern for PID.    Ultrasound demonstrates a single IUP at 8 weeks and 1 day.  There is a second hypoechoic focus which may represent a second empty gestational sac or subchorionic hemorrhage.    Patient has a normal white blood cell count, is Rh+, and has a beta quant of 54,656.    3:34 PM  Paging GYN    3:54 PM  I discussed the case with Dr. Miranda from GYN who will set the patient up for outpatient follow-up.  He agrees with ER workup and recommends pelvic precautions.    Pt will be discharged.        DISPOSITION AND DISCUSSIONS  I have discussed management of the patient with the  following physicians and KEENAN's:    Ruben    Discussion of management with other [facility] or appropriate source(s): None     Barriers to care at this time, including but not limited to: Patient does not have established PCP.       FINAL DIAGNOSIS  1. Generalized abdominal pain    2. Pregnancy with 8 completed weeks gestation      Electronically signed by: Patricia Cortes M.D., 4/20/2024 1:30 PM

## 2024-04-20 NOTE — ED TRIAGE NOTES
"Chief Complaint   Patient presents with    Abdominal Pain     The pt reports generalized abd pain that started 5 days ago. The pt reports 8 weeks pregnant, . The pt went to clinic for routine care and was sent here for blood in urine. The pt reports yellow urine at home. The pt reports diarrhea yesterday       Pt ambulatory to triage. Pt A&Ox4, for the above complaint.     Pt to lobby . Pt educated on alerting staff in changes to condition. Pt verbalized understanding. Protocol abd pain ordered.     /61   Pulse 68   Temp 36.7 °C (98 °F) (Temporal)   Resp 16   Ht 1.702 m (5' 7\")   Wt 80.6 kg (177 lb 11.1 oz)   LMP 2023 (Approximate)   SpO2 98%   BMI 27.83 kg/m²     "

## 2024-04-20 NOTE — DISCHARGE INSTRUCTIONS
Your ultrasound today showed a pregnancy at 8 weeks and 1 day.  It also showed an area of bleeding called a subchorionic hemorrhage.  This does not mean you will have a miscarriage but it is possible.    Come back to the emergency room for soaking 2 pads per hour for 2 hours, dizziness, increasing pain, fever, or other concerns    Follow-up with gynecology.  You should receive a call with an outpatient appointment from the clinic.  Please call them on Monday yourself to set this up as well.    Take Tylenol as needed for pain

## 2024-04-21 LAB
C TRACH DNA GENITAL QL NAA+PROBE: NEGATIVE
N GONORRHOEA DNA GENITAL QL NAA+PROBE: NEGATIVE
SPECIMEN SOURCE: NORMAL

## 2024-04-22 LAB
BACTERIA UR CULT: NORMAL
SIGNIFICANT IND 70042: NORMAL
SITE SITE: NORMAL
SOURCE SOURCE: NORMAL

## 2024-05-18 ENCOUNTER — APPOINTMENT (OUTPATIENT)
Dept: RADIOLOGY | Facility: MEDICAL CENTER | Age: 44
End: 2024-05-18
Payer: MEDICAID

## 2024-05-18 ENCOUNTER — HOSPITAL ENCOUNTER (EMERGENCY)
Facility: MEDICAL CENTER | Age: 44
End: 2024-05-18
Attending: EMERGENCY MEDICINE
Payer: MEDICAID

## 2024-05-18 ENCOUNTER — APPOINTMENT (OUTPATIENT)
Dept: RADIOLOGY | Facility: MEDICAL CENTER | Age: 44
End: 2024-05-18
Attending: EMERGENCY MEDICINE
Payer: MEDICAID

## 2024-05-18 VITALS
HEART RATE: 75 BPM | RESPIRATION RATE: 15 BRPM | HEIGHT: 67 IN | OXYGEN SATURATION: 98 % | SYSTOLIC BLOOD PRESSURE: 99 MMHG | WEIGHT: 177.47 LBS | DIASTOLIC BLOOD PRESSURE: 57 MMHG | BODY MASS INDEX: 27.85 KG/M2 | TEMPERATURE: 97.2 F

## 2024-05-18 DIAGNOSIS — O20.0 THREATENED MISCARRIAGE IN EARLY PREGNANCY: ICD-10-CM

## 2024-05-18 LAB
ALBUMIN SERPL BCP-MCNC: 3.9 G/DL (ref 3.2–4.9)
ALBUMIN/GLOB SERPL: 1.2 G/DL
ALP SERPL-CCNC: 73 U/L (ref 30–99)
ALT SERPL-CCNC: 12 U/L (ref 2–50)
ANION GAP SERPL CALC-SCNC: 13 MMOL/L (ref 7–16)
APPEARANCE UR: CLEAR
AST SERPL-CCNC: 20 U/L (ref 12–45)
B-HCG SERPL-ACNC: ABNORMAL MIU/ML (ref 0–5)
BASOPHILS # BLD AUTO: 0.2 % (ref 0–1.8)
BASOPHILS # BLD: 0.02 K/UL (ref 0–0.12)
BILIRUB SERPL-MCNC: 0.3 MG/DL (ref 0.1–1.5)
BILIRUB UR QL STRIP.AUTO: NEGATIVE
BUN SERPL-MCNC: 6 MG/DL (ref 8–22)
CALCIUM ALBUM COR SERPL-MCNC: 9.5 MG/DL (ref 8.5–10.5)
CALCIUM SERPL-MCNC: 9.4 MG/DL (ref 8.5–10.5)
CHLORIDE SERPL-SCNC: 106 MMOL/L (ref 96–112)
CO2 SERPL-SCNC: 18 MMOL/L (ref 20–33)
COLOR UR: YELLOW
CREAT SERPL-MCNC: 0.74 MG/DL (ref 0.5–1.4)
EOSINOPHIL # BLD AUTO: 0.03 K/UL (ref 0–0.51)
EOSINOPHIL NFR BLD: 0.3 % (ref 0–6.9)
ERYTHROCYTE [DISTWIDTH] IN BLOOD BY AUTOMATED COUNT: 40.7 FL (ref 35.9–50)
GFR SERPLBLD CREATININE-BSD FMLA CKD-EPI: 103 ML/MIN/1.73 M 2
GLOBULIN SER CALC-MCNC: 3.2 G/DL (ref 1.9–3.5)
GLUCOSE SERPL-MCNC: 107 MG/DL (ref 65–99)
GLUCOSE UR STRIP.AUTO-MCNC: NEGATIVE MG/DL
HCT VFR BLD AUTO: 37.3 % (ref 37–47)
HGB BLD-MCNC: 12.6 G/DL (ref 12–16)
IMM GRANULOCYTES # BLD AUTO: 0.02 K/UL (ref 0–0.11)
IMM GRANULOCYTES NFR BLD AUTO: 0.2 % (ref 0–0.9)
KETONES UR STRIP.AUTO-MCNC: NEGATIVE MG/DL
LEUKOCYTE ESTERASE UR QL STRIP.AUTO: NEGATIVE
LIPASE SERPL-CCNC: 35 U/L (ref 11–82)
LYMPHOCYTES # BLD AUTO: 3.24 K/UL (ref 1–4.8)
LYMPHOCYTES NFR BLD: 31.3 % (ref 22–41)
MCH RBC QN AUTO: 30.2 PG (ref 27–33)
MCHC RBC AUTO-ENTMCNC: 33.8 G/DL (ref 32.2–35.5)
MCV RBC AUTO: 89.4 FL (ref 81.4–97.8)
MICRO URNS: NORMAL
MONOCYTES # BLD AUTO: 0.49 K/UL (ref 0–0.85)
MONOCYTES NFR BLD AUTO: 4.7 % (ref 0–13.4)
NEUTROPHILS # BLD AUTO: 6.55 K/UL (ref 1.82–7.42)
NEUTROPHILS NFR BLD: 63.3 % (ref 44–72)
NITRITE UR QL STRIP.AUTO: NEGATIVE
NRBC # BLD AUTO: 0 K/UL
NRBC BLD-RTO: 0 /100 WBC (ref 0–0.2)
NUMBER OF RH DOSES IND 8505RD: NORMAL
PH UR STRIP.AUTO: 6 [PH] (ref 5–8)
PLATELET # BLD AUTO: 249 K/UL (ref 164–446)
PMV BLD AUTO: 10.2 FL (ref 9–12.9)
POTASSIUM SERPL-SCNC: 4.1 MMOL/L (ref 3.6–5.5)
PROT SERPL-MCNC: 7.1 G/DL (ref 6–8.2)
PROT UR QL STRIP: NEGATIVE MG/DL
RBC # BLD AUTO: 4.17 M/UL (ref 4.2–5.4)
RBC UR QL AUTO: NEGATIVE
RH BLD: NORMAL
SODIUM SERPL-SCNC: 137 MMOL/L (ref 135–145)
SP GR UR STRIP.AUTO: 1.01
UROBILINOGEN UR STRIP.AUTO-MCNC: 0.2 MG/DL
WBC # BLD AUTO: 10.4 K/UL (ref 4.8–10.8)

## 2024-05-18 PROCEDURE — 83690 ASSAY OF LIPASE: CPT

## 2024-05-18 PROCEDURE — 76801 OB US < 14 WKS SINGLE FETUS: CPT

## 2024-05-18 PROCEDURE — 84702 CHORIONIC GONADOTROPIN TEST: CPT

## 2024-05-18 PROCEDURE — 99284 EMERGENCY DEPT VISIT MOD MDM: CPT

## 2024-05-18 PROCEDURE — 80053 COMPREHEN METABOLIC PANEL: CPT

## 2024-05-18 PROCEDURE — 86901 BLOOD TYPING SEROLOGIC RH(D): CPT

## 2024-05-18 PROCEDURE — 85025 COMPLETE CBC W/AUTO DIFF WBC: CPT

## 2024-05-18 PROCEDURE — 81003 URINALYSIS AUTO W/O SCOPE: CPT

## 2024-05-18 PROCEDURE — 36415 COLL VENOUS BLD VENIPUNCTURE: CPT

## 2024-05-18 ASSESSMENT — FIBROSIS 4 INDEX: FIB4 SCORE: 0.89

## 2024-05-18 ASSESSMENT — PAIN DESCRIPTION - PAIN TYPE: TYPE: ACUTE PAIN

## 2024-05-19 NOTE — ED NOTES
Chief Complaint   Patient presents with    Abdominal Pain     X 1 day pt reports intermittent lower abdominal pain. -N/V, -dysuria, -vaginal bleeding.   Pt currently 11 weeks pregnant (), denies complications in previous pregnancy.      Agree with triage RN. Pt ambulatory with steady gait. Up to BR self for Urine sample. Instructions for clean catch provided.

## 2024-05-19 NOTE — ED TRIAGE NOTES
"Chief Complaint   Patient presents with    Abdominal Pain     X 1 day pt reports intermittent lower abdominal pain. -N/V, -dysuria, -vaginal bleeding.   Pt currently 11 weeks pregnant (), denies complications in previous pregnancy.        42 yo F to triage for above complaint. Pt not currently following with OBGYN. Abdominal pain protocol ordered.      Pt placed in lobby. Pt educated on triage process. Pt encouraged to alert staff for any changes.     Patient and staff wearing appropriate PPE    /51   Pulse 68   Temp 36.6 °C (97.8 °F) (Temporal)   Resp 16   Ht 1.702 m (5' 7\")   Wt 80.5 kg (177 lb 7.5 oz)   LMP 2023 (Approximate)   SpO2 99%   BMI 27.80 kg/m²     "

## 2024-05-19 NOTE — ED NOTES
Pt discharged to home. Discharge paperwork provided. Education provided by ERP. Reinforced discharge instructions.  Pt was given follow up instructions   Pt verbalized understanding of all instructions for discharge.   Patient went out of the ER ambulatory with steady gait., alert and oriented x 4, with all belongings.      to drive pt home

## 2024-05-19 NOTE — ED PROVIDER NOTES
ED Provider Note    CHIEF COMPLAINT  Chief Complaint   Patient presents with    Abdominal Pain     X 1 day pt reports intermittent lower abdominal pain. -N/V, -dysuria, -vaginal bleeding.   Pt currently 11 weeks pregnant (), denies complications in previous pregnancy.        EXTERNAL RECORDS REVIEWED  Reviewed ultrasound dated     HPI/ROS  LIMITATION TO HISTORY   Swedish-speaking only  OUTSIDE HISTORIAN(S):   provided additional history    Jaylene Oneil is a 43 y.o. female who presents for some crampy lower abdominal pain small amount of spotting.  The patient reports that she should be around 11 to 12 weeks pregnant based on her dates and an ultrasound performed 4 weeks ago at this facility.  The patient has had 1 miscarriage and 4 pregnancies this will be her fifth pregnancy.  No associated heavy vaginal bleeding.  She has no upper abdominal pain nausea or vomiting.  No urinary symptoms such as dysuria or hematuria.  No high fevers or chills.    PAST MEDICAL HISTORY   has a past medical history of Umbilical hernia (2009).    SURGICAL HISTORY   has a past surgical history that includes anjelica by laparoscopy (10/16/2011); umbilical hernia repair (10/16/2011); and hernia repair.    FAMILY HISTORY  Family History   Problem Relation Age of Onset    Hypertension Mother        SOCIAL HISTORY  Social History     Tobacco Use    Smoking status: Never    Smokeless tobacco: Never   Vaping Use    Vaping status: Never Used   Substance and Sexual Activity    Alcohol use: Not Currently     Alcohol/week: 0.6 oz     Types: 1 Cans of beer per week     Comment: Soccially only but not during pregnancy    Drug use: No    Sexual activity: Yes     Partners: Male     Comment: none. Unplanned pregnancy        CURRENT MEDICATIONS  Home Medications       Reviewed by Jordana Benson, RDavidNDavid (Registered Nurse) on 24 at 1735  Med List Status: Not Addressed     Medication Last Dose Status   ibuprofen (MOTRIN) 800  "MG Tab  Active   loratadine (CLARITIN) 10 MG Tab  Active   naproxen (NAPROSYN) 500 MG Tab  Active   norethindrone (MICRONOR) 0.35 MG tablet  Active   Prenatal MV-Min-Fe Fum-FA-DHA (PRENATAL 1 PO)  Active                  Audit from Redirected Encounters    **Home medications have not yet been reviewed for this encounter**         ALLERGIES  No Known Allergies    PHYSICAL EXAM  VITAL SIGNS: /57   Pulse 74   Temp 36.6 °C (97.8 °F) (Temporal)   Resp 16   Ht 1.702 m (5' 7\")   Wt 80.5 kg (177 lb 7.5 oz)   LMP 2023 (Approximate)   SpO2 96%   BMI 27.80 kg/m²    Pulse ox interpretation: I interpret this pulse ox as normal.  Constitutional: Alert and oriented x 3, no acute distress  HEENT: Atraumatic normocephalic, pupils are equal round reactive to light extraocular movements are intact. The nares is clear, external ears are normal, mouth shows moist mucous membranes normal dentition for age  Neck: Supple, no JVD no tracheal deviation  Cardiovascular: Regular rate and rhythm no murmur rub or gallop 2+ pulses peripherally x4  Thorax & Lungs: No respiratory distress, no wheezes rales or rhonchi, No chest tenderness.   GI: Soft nontender early   Skin: Warm dry no acute rash or lesion  Musculoskeletal: Moving all extremities with full range and 5 of 5 strength no acute  deformity  Neurologic: Cranial nerves III through XII are grossly intact no sensory deficit no cerebellar dysfunction   Psychiatric: Appropriate affect for situation at this time          EKG/LABS  Results for orders placed or performed during the hospital encounter of 24   CBC WITH DIFFERENTIAL   Result Value Ref Range    WBC 10.4 4.8 - 10.8 K/uL    RBC 4.17 (L) 4.20 - 5.40 M/uL    Hemoglobin 12.6 12.0 - 16.0 g/dL    Hematocrit 37.3 37.0 - 47.0 %    MCV 89.4 81.4 - 97.8 fL    MCH 30.2 27.0 - 33.0 pg    MCHC 33.8 32.2 - 35.5 g/dL    RDW 40.7 35.9 - 50.0 fL    Platelet Count 249 164 - 446 K/uL    MPV 10.2 9.0 - 12.9 fL    " Neutrophils-Polys 63.30 44.00 - 72.00 %    Lymphocytes 31.30 22.00 - 41.00 %    Monocytes 4.70 0.00 - 13.40 %    Eosinophils 0.30 0.00 - 6.90 %    Basophils 0.20 0.00 - 1.80 %    Immature Granulocytes 0.20 0.00 - 0.90 %    Nucleated RBC 0.00 0.00 - 0.20 /100 WBC    Neutrophils (Absolute) 6.55 1.82 - 7.42 K/uL    Lymphs (Absolute) 3.24 1.00 - 4.80 K/uL    Monos (Absolute) 0.49 0.00 - 0.85 K/uL    Eos (Absolute) 0.03 0.00 - 0.51 K/uL    Baso (Absolute) 0.02 0.00 - 0.12 K/uL    Immature Granulocytes (abs) 0.02 0.00 - 0.11 K/uL    NRBC (Absolute) 0.00 K/uL   COMP METABOLIC PANEL   Result Value Ref Range    Sodium 137 135 - 145 mmol/L    Potassium 4.1 3.6 - 5.5 mmol/L    Chloride 106 96 - 112 mmol/L    Co2 18 (L) 20 - 33 mmol/L    Anion Gap 13.0 7.0 - 16.0    Glucose 107 (H) 65 - 99 mg/dL    Bun 6 (L) 8 - 22 mg/dL    Creatinine 0.74 0.50 - 1.40 mg/dL    Calcium 9.4 8.5 - 10.5 mg/dL    Correct Calcium 9.5 8.5 - 10.5 mg/dL    AST(SGOT) 20 12 - 45 U/L    ALT(SGPT) 12 2 - 50 U/L    Alkaline Phosphatase 73 30 - 99 U/L    Total Bilirubin 0.3 0.1 - 1.5 mg/dL    Albumin 3.9 3.2 - 4.9 g/dL    Total Protein 7.1 6.0 - 8.2 g/dL    Globulin 3.2 1.9 - 3.5 g/dL    A-G Ratio 1.2 g/dL   LIPASE   Result Value Ref Range    Lipase 35 11 - 82 U/L   HCG QUANTITATIVE   Result Value Ref Range    Bhcg 83651.0 (H) 0.0 - 5.0 mIU/mL   URINALYSIS,CULTURE IF INDICATED    Specimen: Blood   Result Value Ref Range    Color Yellow     Character Clear     Specific Gravity 1.010 <1.035    Ph 6.0 5.0 - 8.0    Glucose Negative Negative mg/dL    Ketones Negative Negative mg/dL    Protein Negative Negative mg/dL    Bilirubin Negative Negative    Urobilinogen, Urine 0.2 Negative    Nitrite Negative Negative    Leukocyte Esterase Negative Negative    Occult Blood Negative Negative    Micro Urine Req see below    ESTIMATED GFR   Result Value Ref Range    GFR (CKD-EPI) 103 >60 mL/min/1.73 m 2       I have independently interpreted this EKG    RADIOLOGY/PROCEDURES    I have independently interpreted the diagnostic imaging associated with this visit and am waiting the final reading from the radiologist.   My preliminary interpretation is as follows: Viable intrauterine pregnancy small saccular focus seen on previous ultrasound    Radiologist interpretation:  US-OB 1ST TRIMESTER SINGLE GEST Is the patient pregnant? Yes   Final Result      1.  Single living intrauterine pregnancy at 12 weeks 4 days estimated gestational age.      2. Second saccular focus adjacent to the gestational sac is similar to slightly smaller, measuring 1.3 x 1.1 cm.          COURSE & MEDICAL DECISION MAKING    ASSESSMENT, COURSE AND PLAN  Care Narrative:     This is a very pleasant 43-year-old female who presents here with some crampy lower abdominal pain small amount of spotting in the setting of what appears to be late first trimester likely early second trimester pregnancy.  She has not establish care with an obstetrician yet.  I reviewed her records and she did have a viable intrauterine pregnancy around a month ago.  Her laboratory studies repeated including CBC metabolic panel quantitative hCG and Rh.  RhoGAM it was not clinically indicated hemoglobin is stable and there is no suggestion of infection.  I performed serial abdominal exams and she did not have any peritoneal signs or suggestion of urinary tract infection appendicitis or other process.  I suspect she is experiencing round ligament pain.  Ultrasound confirmed viable pregnancy with a small associated saccular focus which was previously seen.  Unclear what the clinical significance of this is.  Patient was advised to partake of pelvic rest and to return as needed for new or worsening symptoms.  [facility]        ADDITIONAL PROBLEMS MANAGED      DISPOSITION AND DISCUSSIONS  I have discussed management of the patient with the following physicians and KEENAN's: None    Discussion of management with  other [facility] or appropriate source(s): None    Escalation of care considered, and ultimately not performed: None    Barriers to care at this time, including but not limited to: None.     Decision tools and prescription drugs considered including, but not limited to: None.    FINAL DIAGNOSIS  Threatened miscarriage, 12-week intrauterine pregnancy       Electronically signed by: Vitor Davila M.D., 5/18/2024 6:19 PM

## 2024-05-22 ENCOUNTER — APPOINTMENT (OUTPATIENT)
Dept: OBGYN | Facility: CLINIC | Age: 44
End: 2024-05-22

## 2024-05-22 ENCOUNTER — HOSPITAL ENCOUNTER (OUTPATIENT)
Facility: MEDICAL CENTER | Age: 44
End: 2024-05-22
Payer: COMMERCIAL

## 2024-05-22 ENCOUNTER — INITIAL PRENATAL (OUTPATIENT)
Dept: OBGYN | Facility: CLINIC | Age: 44
End: 2024-05-22

## 2024-05-22 VITALS — SYSTOLIC BLOOD PRESSURE: 104 MMHG | WEIGHT: 176.6 LBS | DIASTOLIC BLOOD PRESSURE: 66 MMHG | BODY MASS INDEX: 27.66 KG/M2

## 2024-05-22 DIAGNOSIS — O09.521 MULTIGRAVIDA OF ADVANCED MATERNAL AGE IN FIRST TRIMESTER: ICD-10-CM

## 2024-05-22 DIAGNOSIS — Z34.82 PRENATAL CARE, SUBSEQUENT PREGNANCY, SECOND TRIMESTER: ICD-10-CM

## 2024-05-22 PROBLEM — O09.529 AMA (ADVANCED MATERNAL AGE) MULTIGRAVIDA 35+: Status: ACTIVE | Noted: 2024-05-22

## 2024-05-22 PROCEDURE — 0500F INITIAL PRENATAL CARE VISIT: CPT

## 2024-05-22 PROCEDURE — 8198 PREG CTR PKG RATE (SYSTEM)

## 2024-05-22 PROCEDURE — 3078F DIAST BP <80 MM HG: CPT

## 2024-05-22 PROCEDURE — 3074F SYST BP LT 130 MM HG: CPT

## 2024-05-22 ASSESSMENT — EDINBURGH POSTNATAL DEPRESSION SCALE (EPDS)
I HAVE FELT SAD OR MISERABLE: NO, NOT AT ALL
THINGS HAVE BEEN GETTING ON TOP OF ME: NO, I HAVE BEEN COPING AS WELL AS EVER
I HAVE FELT SCARED OR PANICKY FOR NO GOOD REASON: NO, NOT AT ALL
I HAVE BEEN ABLE TO LAUGH AND SEE THE FUNNY SIDE OF THINGS: AS MUCH AS I ALWAYS COULD
I HAVE BEEN ANXIOUS OR WORRIED FOR NO GOOD REASON: NO, NOT AT ALL
I HAVE LOOKED FORWARD WITH ENJOYMENT TO THINGS: AS MUCH AS I EVER DID
TOTAL SCORE: 0
I HAVE BEEN SO UNHAPPY THAT I HAVE HAD DIFFICULTY SLEEPING: NOT AT ALL
I HAVE BLAMED MYSELF UNNECESSARILY WHEN THINGS WENT WRONG: NO, NEVER
I HAVE BEEN SO UNHAPPY THAT I HAVE BEEN CRYING: NO, NEVER
THE THOUGHT OF HARMING MYSELF HAS OCCURRED TO ME: NEVER

## 2024-05-22 ASSESSMENT — FIBROSIS 4 INDEX: FIB4 SCORE: 0.97

## 2024-05-22 NOTE — PROGRESS NOTES
Risk factors:   AMA  Referrals made today:   perinatology      Subjective:   Jaylene Oneil is a 43 y.o.  who presents for her new OB exam. She is 13w0d with an SANDRA of Estimated Date of Delivery: 24 based off of LMP. This was planned and desired. She feels overall well with some lower abdominal pain. Her partner is supportive.     She reports feeling overall well. She has no concerns at this time. She lives with her  and children and stay at home. Denies ER visits or previous care in this pregnancy.     Denies any current or hx of sexual, emotional or physical abuse or trauma.     ROS:  denies weakness, fatigue, or malaise  denies headache, changes in vision  denies constipation, diarrhea  denies dysuria  denies changes in appetite, nausea, vomiting  denies vaginal bleeding, leaking of fluid, discharge, irritation  denies depression, anxiety  denies cramping/contractions  denies fetal movement    No Known Allergies     Past Medical History:   Diagnosis Date    Umbilical hernia 2009     History of Varicella: no  History of HSV I or II in self or partner: no  History of Thyroid problems: no    Family History   Problem Relation Age of Onset    Hypertension Mother     No Known Problems Father     No Known Problems Sister      denies hx family genetic conditions    Social History     Socioeconomic History    Marital status:      Spouse name: Not on file    Number of children: Not on file    Years of education: Not on file    Highest education level: Not on file   Occupational History    Not on file   Tobacco Use    Smoking status: Never    Smokeless tobacco: Never   Vaping Use    Vaping status: Never Used   Substance and Sexual Activity    Alcohol use: Not Currently     Alcohol/week: 0.6 oz     Types: 1 Cans of beer per week     Comment: Soccially only but not during pregnancy    Drug use: No    Sexual activity: Yes     Partners: Male     Birth control/protection: None      Comment: . Unplanned pregnancy   Other Topics Concern    Not on file   Social History Narrative    Not on file     Social Determinants of Health     Financial Resource Strain: Not on file   Food Insecurity: Not on file   Transportation Needs: Not on file   Physical Activity: Not on file   Stress: Not on file   Social Connections: Not on file   Intimate Partner Violence: Not on file   Housing Stability: Not on file     denies current smoking, alcohol use, illicit drug use    OB History    Para Term  AB Living   6 4 4   1 4   SAB IAB Ectopic Molar Multiple Live Births   1         4      # Outcome Date GA Lbr Rajesh/2nd Weight Sex Delivery Anes PTL Lv   6 Current            5 SAB 2023     SAB         Birth Comments: passed on its own   4 Term 17 39w6d  8 lb 6 oz F Vag-Spont Spinal, EPI N FACUNDO   3 Term 02/21/10 39w0d  8 lb 7 oz F Vag-Spont None N FACUNDO      Birth Comments: Pt states no complications   2 Term 02 40w0d  8 lb 8 oz F Vag-Spont None N FACUNDO      Birth Comments: Pt states no complications   1 Term 10/06/98 40w0d  8 lb F Vag-Spont None N FACUNDO      Birth Comments: Pt states no complications     Reports history of four uncomplicated pregnancies and births. No GDM or HTN.      Objective:  /66   Wt 176 lb 9.6 oz      FHTs: 160    Well nourished female in no acute distress  AAO x 3  Heart RRR  Lungs clear to auscultation bilaterally  No edema noted, pulses WNL bilaterally in lower extremities  BS x 4; no guarding or tenderness    Assessment:        1.  IUP @ 13w0d per LMP, c/w early US        2.  S=D        3.  See problem list below    Patient Active Problem List    Diagnosis Date Noted    AMA (advanced maternal age) multigravida 35+ 2024    Prenatal care, subsequent pregnancy, second trimester 2024    Chronic bilateral low back pain without sciatica 2017     Plan:        1.  GC/CT done today        2.  Prenatal labs ordered - lab slip given        3.  Encouraged  PNV; diet with high protein snacks and limited sugar/sugary beverages, adequate water intake; and foods/medications/exposures to avoid        4.  NOB packet given        5.  Discussed genetic testing options; unsure about NIPT or genetic testing, would like to disc with  and let us know. Discussed increased risks of genetic conditions such as T21 with AMA pregnancies.         5.  Return to office in 4 wks        6.  Complete anatomy OB US with MFM in 7 weeks        7.  Referral to Perinatology    Orders Placed This Encounter    PREG CNTR PRENATAL PN    URINE DRUG SCREEN W/CONF (AR)    URINE CULTURE    Chlamydia/GC, PCR (Urine)    VAGINAL PATHOGENS DNA PANEL    Referral to Perinatology       Tennille Turpin CNM

## 2024-05-22 NOTE — PROGRESS NOTES
NOB visit  LMP: 02/21/2024 with SANDRA of 02/21/2024  Pt hd US on 04/20/2024 with SANDRA of 11/29/2024  Pt states having morning sickness and lower abdominal pain. States no other complaints or concerns today.   Last pap: 07/18/2023 Negative  WT: 176.6 lb  BP: 104/66    Good # [phone]    Pt unsure of genetic testing, would like to discuss with her  first     EPDS Score: 0

## 2024-05-23 LAB
C TRACH DNA GENITAL QL NAA+PROBE: NEGATIVE
CANDIDA DNA VAG QL PROBE+SIG AMP: NEGATIVE
G VAGINALIS DNA VAG QL PROBE+SIG AMP: NEGATIVE
N GONORRHOEA DNA GENITAL QL NAA+PROBE: NEGATIVE
SPECIMEN SOURCE: NORMAL
T VAGINALIS DNA VAG QL PROBE+SIG AMP: NEGATIVE

## 2024-05-24 ENCOUNTER — HOSPITAL ENCOUNTER (OUTPATIENT)
Dept: LAB | Facility: MEDICAL CENTER | Age: 44
End: 2024-05-24
Payer: COMMERCIAL

## 2024-05-24 DIAGNOSIS — O09.521 MULTIGRAVIDA OF ADVANCED MATERNAL AGE IN FIRST TRIMESTER: ICD-10-CM

## 2024-05-24 LAB
ABO GROUP BLD: NORMAL
BLD GP AB SCN SERPL QL: NORMAL
ERYTHROCYTE [DISTWIDTH] IN BLOOD BY AUTOMATED COUNT: 41 FL (ref 35.9–50)
HBV SURFACE AG SER QL: ABNORMAL
HCT VFR BLD AUTO: 36.2 % (ref 37–47)
HCV AB SER QL: ABNORMAL
HGB BLD-MCNC: 12.5 G/DL (ref 12–16)
HIV 1+2 AB+HIV1 P24 AG SERPL QL IA: NORMAL
MCH RBC QN AUTO: 30.9 PG (ref 27–33)
MCHC RBC AUTO-ENTMCNC: 34.5 G/DL (ref 32.2–35.5)
MCV RBC AUTO: 89.6 FL (ref 81.4–97.8)
PLATELET # BLD AUTO: 221 K/UL (ref 164–446)
PMV BLD AUTO: 11.1 FL (ref 9–12.9)
RBC # BLD AUTO: 4.04 M/UL (ref 4.2–5.4)
RH BLD: NORMAL
RUBV AB SER QL: 232 IU/ML
T PALLIDUM AB SER QL IA: ABNORMAL
WBC # BLD AUTO: 9.7 K/UL (ref 4.8–10.8)

## 2024-05-25 LAB
BACTERIA UR CULT: NORMAL
SIGNIFICANT IND 70042: NORMAL
SITE SITE: NORMAL
SOURCE SOURCE: NORMAL

## 2024-05-26 LAB
AMPHET CTO UR CFM-MCNC: NEGATIVE NG/ML
BACTERIA UR CULT: NORMAL
BARBITURATES CTO UR CFM-MCNC: NEGATIVE NG/ML
BENZODIAZ CTO UR CFM-MCNC: NEGATIVE NG/ML
CANNABINOIDS CTO UR CFM-MCNC: NEGATIVE NG/ML
COCAINE CTO UR CFM-MCNC: NEGATIVE NG/ML
CREAT UR-MCNC: 216.5 MG/DL (ref 20–400)
DRUG COMMENT 753798: NORMAL
METHADONE CTO UR CFM-MCNC: NEGATIVE NG/ML
OPIATES CTO UR CFM-MCNC: NEGATIVE NG/ML
PCP CTO UR CFM-MCNC: NEGATIVE NG/ML
PROPOXYPH CTO UR CFM-MCNC: NEGATIVE NG/ML
SIGNIFICANT IND 70042: NORMAL
SITE SITE: NORMAL
SOURCE SOURCE: NORMAL

## 2024-05-29 ENCOUNTER — APPOINTMENT (OUTPATIENT)
Dept: OBGYN | Facility: CLINIC | Age: 44
End: 2024-05-29

## 2024-06-25 ENCOUNTER — ROUTINE PRENATAL (OUTPATIENT)
Dept: OBGYN | Facility: CLINIC | Age: 44
End: 2024-06-25

## 2024-06-25 VITALS — BODY MASS INDEX: 27.22 KG/M2 | DIASTOLIC BLOOD PRESSURE: 68 MMHG | WEIGHT: 173.8 LBS | SYSTOLIC BLOOD PRESSURE: 92 MMHG

## 2024-06-25 DIAGNOSIS — O09.529 AMA (ADVANCED MATERNAL AGE) MULTIGRAVIDA 35+, UNSPECIFIED TRIMESTER: Primary | ICD-10-CM

## 2024-06-25 PROCEDURE — 99212 OFFICE O/P EST SF 10 MIN: CPT | Performed by: NURSE PRACTITIONER

## 2024-06-25 PROCEDURE — 3074F SYST BP LT 130 MM HG: CPT | Performed by: NURSE PRACTITIONER

## 2024-06-25 PROCEDURE — 3078F DIAST BP <80 MM HG: CPT | Performed by: NURSE PRACTITIONER

## 2024-06-25 RX ORDER — ASPIRIN 81 MG/1
81 TABLET ORAL DAILY
COMMUNITY

## 2024-06-25 RX ORDER — ASPIRIN 81 MG/1
81 TABLET ORAL DAILY
Qty: 100 TABLET | Refills: 0 | Status: SHIPPED | OUTPATIENT
Start: 2024-06-25

## 2024-06-25 RX ORDER — FAMOTIDINE 40 MG/1
40 TABLET, FILM COATED ORAL DAILY
Qty: 90 TABLET | Refills: 3 | Status: SHIPPED | OUTPATIENT
Start: 2024-06-25

## 2024-06-25 ASSESSMENT — FIBROSIS 4 INDEX: FIB4 SCORE: 1.1

## 2024-06-25 NOTE — PROGRESS NOTES
Pt. Here for OB/FU, pt states feeling some fetal flutter.   MFM on 7/9/2024 for AMA  Good # [phone] (home)   Pt states having nausea.   Pt given AFP lab slip today along with instructions.

## 2024-06-25 NOTE — PROGRESS NOTES
S: [facility] visit for 7/9.  Feeling a little fetal movement.  Having some heartburn/reflux.  Has not started baby ASA yet.      O: Vitals see flows     Vitals:    06/25/24 1007   BP: 92/68          A: Jaylene Oneil IUP at 17w6d     Patient Active Problem List    Diagnosis Date Noted    AMA (advanced maternal age) multigravida 35+ 05/22/2024    Prenatal care, subsequent pregnancy, second trimester 05/22/2024    Chronic bilateral low back pain without sciatica 06/14/2017          P: Studies/Labs to order today: pepcid, start baby aspirin.  AFP given.     Studies/Labsfor next visit:    Follow-up: 4 wks    Precautions reviewed with patient appropriate for gestational age.

## 2024-07-08 ENCOUNTER — HOSPITAL ENCOUNTER (OUTPATIENT)
Dept: LAB | Facility: MEDICAL CENTER | Age: 44
End: 2024-07-08
Attending: NURSE PRACTITIONER
Payer: COMMERCIAL

## 2024-07-08 DIAGNOSIS — O09.529 AMA (ADVANCED MATERNAL AGE) MULTIGRAVIDA 35+, UNSPECIFIED TRIMESTER: ICD-10-CM

## 2024-07-09 ENCOUNTER — ANCILLARY PROCEDURE (OUTPATIENT)
Dept: MATERNAL FETAL MEDICINE | Facility: MEDICAL CENTER | Age: 44
End: 2024-07-09
Payer: MEDICAID

## 2024-07-09 VITALS
SYSTOLIC BLOOD PRESSURE: 112 MMHG | DIASTOLIC BLOOD PRESSURE: 52 MMHG | WEIGHT: 175.3 LBS | BODY MASS INDEX: 27.46 KG/M2 | HEART RATE: 73 BPM

## 2024-07-09 DIAGNOSIS — O09.529 ANTEPARTUM MULTIGRAVIDA OF ADVANCED MATERNAL AGE: ICD-10-CM

## 2024-07-09 PROCEDURE — 76817 TRANSVAGINAL US OBSTETRIC: CPT | Performed by: OBSTETRICS & GYNECOLOGY

## 2024-07-09 PROCEDURE — 76811 OB US DETAILED SNGL FETUS: CPT | Performed by: OBSTETRICS & GYNECOLOGY

## 2024-07-09 PROCEDURE — 99203 OFFICE O/P NEW LOW 30 MIN: CPT | Performed by: OBSTETRICS & GYNECOLOGY

## 2024-07-09 ASSESSMENT — FIBROSIS 4 INDEX: FIB4 SCORE: 1.1

## 2024-07-10 ENCOUNTER — TELEPHONE (OUTPATIENT)
Dept: OBGYN | Facility: CLINIC | Age: 44
End: 2024-07-10
Payer: MEDICAID

## 2024-07-10 DIAGNOSIS — O28.5 MATERNAL SERUM SCREEN POSITIVE FOR TRISOMY 21: ICD-10-CM

## 2024-07-10 LAB
# FETUSES US: ABNORMAL
AFP MOM SERPL: 0.56
AFP SERPL-MCNC: 29 NG/ML
AGE - REPORTED: 43.2 YR
CURRENT SMOKER: NO
FAMILY MEMBER DISEASES HX: NO
GA METHOD: ABNORMAL
GA: ABNORMAL WK
HCG MOM SERPL: 1.38
HCG SERPL-ACNC: ABNORMAL IU/L
HX OF HEREDITARY DISORDERS: NO
IDDM PATIENT QL: NO
INHIBIN A MOM SERPL: 2.02
INHIBIN A SERPL-MCNC: 308 PG/ML
INTEGRATED SCN PATIENT-IMP: ABNORMAL
PATHOLOGY STUDY: ABNORMAL
SPECIMEN DRAWN SERPL: ABNORMAL
U ESTRIOL MOM SERPL: 0.89
U ESTRIOL SERPL-MCNC: 2.61 NG/ML

## 2024-07-15 ENCOUNTER — OFFICE VISIT (OUTPATIENT)
Dept: MATERNAL FETAL MEDICINE | Facility: MEDICAL CENTER | Age: 44
End: 2024-07-15
Payer: MEDICAID

## 2024-07-15 VITALS
DIASTOLIC BLOOD PRESSURE: 48 MMHG | HEART RATE: 76 BPM | BODY MASS INDEX: 27.58 KG/M2 | WEIGHT: 176.1 LBS | SYSTOLIC BLOOD PRESSURE: 100 MMHG

## 2024-07-15 DIAGNOSIS — O09.522 AMA (ADVANCED MATERNAL AGE) MULTIGRAVIDA 35+, SECOND TRIMESTER: ICD-10-CM

## 2024-07-15 PROCEDURE — 3078F DIAST BP <80 MM HG: CPT | Performed by: OBSTETRICS & GYNECOLOGY

## 2024-07-15 PROCEDURE — 99213 OFFICE O/P EST LOW 20 MIN: CPT | Performed by: OBSTETRICS & GYNECOLOGY

## 2024-07-15 PROCEDURE — 3074F SYST BP LT 130 MM HG: CPT | Performed by: OBSTETRICS & GYNECOLOGY

## 2024-07-15 ASSESSMENT — FIBROSIS 4 INDEX: FIB4 SCORE: 1.1

## 2024-07-29 ENCOUNTER — ROUTINE PRENATAL (OUTPATIENT)
Dept: OBGYN | Facility: CLINIC | Age: 44
End: 2024-07-29
Payer: MEDICAID

## 2024-07-29 VITALS — BODY MASS INDEX: 27.25 KG/M2 | SYSTOLIC BLOOD PRESSURE: 100 MMHG | DIASTOLIC BLOOD PRESSURE: 60 MMHG | WEIGHT: 174 LBS

## 2024-07-29 DIAGNOSIS — O09.529 ANTEPARTUM MULTIGRAVIDA OF ADVANCED MATERNAL AGE: ICD-10-CM

## 2024-07-29 DIAGNOSIS — O28.3 ABNORMAL ANTENATAL ULTRASOUND: ICD-10-CM

## 2024-07-29 DIAGNOSIS — O28.5 MATERNAL SERUM SCREEN POSITIVE FOR TRISOMY 21: ICD-10-CM

## 2024-07-29 ASSESSMENT — FIBROSIS 4 INDEX: FIB4 SCORE: 1.1

## 2024-08-26 ENCOUNTER — ROUTINE PRENATAL (OUTPATIENT)
Dept: OBGYN | Facility: CLINIC | Age: 44
End: 2024-08-26
Payer: MEDICAID

## 2024-08-26 VITALS — SYSTOLIC BLOOD PRESSURE: 80 MMHG | DIASTOLIC BLOOD PRESSURE: 40 MMHG | WEIGHT: 179 LBS | BODY MASS INDEX: 28.04 KG/M2

## 2024-08-26 DIAGNOSIS — O09.522 MULTIGRAVIDA OF ADVANCED MATERNAL AGE IN SECOND TRIMESTER: ICD-10-CM

## 2024-08-26 PROCEDURE — 0502F SUBSEQUENT PRENATAL CARE: CPT | Performed by: NURSE PRACTITIONER

## 2024-08-26 PROCEDURE — 3074F SYST BP LT 130 MM HG: CPT | Performed by: NURSE PRACTITIONER

## 2024-08-26 PROCEDURE — 3078F DIAST BP <80 MM HG: CPT | Performed by: NURSE PRACTITIONER

## 2024-08-26 RX ORDER — FAMOTIDINE 20 MG/1
20 TABLET, FILM COATED ORAL 2 TIMES DAILY
Qty: 60 TABLET | Refills: 1 | Status: SHIPPED | OUTPATIENT
Start: 2024-08-26

## 2024-08-26 ASSESSMENT — FIBROSIS 4 INDEX: FIB4 SCORE: 1.1

## 2024-08-26 NOTE — PROGRESS NOTES
SUBJECTIVE:  Pt is a 43 y.o.   at 26w5d  gestation. Presents today for follow-up prenatal care. Reports good  fetal movement. Denies regular cramping/contractions, bleeding or leaking of fluid. Denies dysuria, headaches, N/V. Generally feels well today except some abdominal soreness and heart burn.     OBJECTIVE:  - See prenatal vitals flow  -   Vitals:    24 1314   BP: (!) 80/40   Weight: 179 lb                 ASSESSMENT:   - IUP at 26w5d    - S=D   -   Patient Active Problem List    Diagnosis Date Noted    Abnormal  ultrasound 2024    Maternal serum screen positive for trisomy 21 07/10/2024    AMA (advanced maternal age) multigravida 35+ 2024         PLAN:  - S/sx pregnancy and labor warning signs vs general discomforts discussed  - Fetal movements and/or kick counts reviewed   - Adequate hydration reinforced  - Nutrition/exercise/vitamin education; continue PNV  - Pepcid rx sent in  - Has f/u with MFM scheduled   - Still planning to do third tri labs  - Anticipatory guidance given  - RTC in 4 weeks for follow-up prenatal care

## 2024-08-26 NOTE — PROGRESS NOTES
Pt here for Ob follow up  Reports good FM  Pt denies VB, LOF, or UC  Chaperone offered and not indicated  Pt states her belly is starting to feel hard  Phone/pharm verfied [phone]        used: [id number] [facility]  3rd trimester labs: Future

## 2024-09-04 ENCOUNTER — ANCILLARY PROCEDURE (OUTPATIENT)
Dept: MATERNAL FETAL MEDICINE | Facility: MEDICAL CENTER | Age: 44
End: 2024-09-04
Attending: OBSTETRICS & GYNECOLOGY
Payer: MEDICAID

## 2024-09-04 VITALS
BODY MASS INDEX: 27.74 KG/M2 | DIASTOLIC BLOOD PRESSURE: 50 MMHG | SYSTOLIC BLOOD PRESSURE: 97 MMHG | HEART RATE: 81 BPM | WEIGHT: 177.1 LBS

## 2024-09-04 DIAGNOSIS — O09.523 AMA (ADVANCED MATERNAL AGE) MULTIGRAVIDA 35+, THIRD TRIMESTER: ICD-10-CM

## 2024-09-04 DIAGNOSIS — O09.529 ANTEPARTUM MULTIGRAVIDA OF ADVANCED MATERNAL AGE: ICD-10-CM

## 2024-09-04 DIAGNOSIS — Z3A.28 28 WEEKS GESTATION OF PREGNANCY: ICD-10-CM

## 2024-09-04 DIAGNOSIS — O28.3 ABNORMAL ANTENATAL ULTRASOUND: ICD-10-CM

## 2024-09-04 DIAGNOSIS — O28.5 ABNORMAL CHROMOSOMAL AND GENETIC FINDING ON ANTENATAL SCREENING MOTHER: ICD-10-CM

## 2024-09-04 DIAGNOSIS — O28.3 ECHOGENIC INTRACARDIAC FOCUS OF FETUS ON PRENATAL ULTRASOUND: ICD-10-CM

## 2024-09-04 DIAGNOSIS — O35.EXX0 ENCOUNTER FOR REPEAT ULTRASOUND OF FETAL PYELECTASIS, ANTEPARTUM, NOT APPLICABLE OR UNSPECIFIED FETUS: ICD-10-CM

## 2024-09-04 DIAGNOSIS — O28.3 ABNORMAL ULTRASONIC FINDING ON ANTENATAL SCREENING OF MOTHER: ICD-10-CM

## 2024-09-04 DIAGNOSIS — O35.BXX0 FETAL CARDIAC ANOMALY COMPLICATING PREGNANCY, ANTEPARTUM, NOT APPLICABLE OR UNSPECIFIED FETUS: ICD-10-CM

## 2024-09-04 PROCEDURE — 99213 OFFICE O/P EST LOW 20 MIN: CPT | Performed by: OBSTETRICS & GYNECOLOGY

## 2024-09-04 PROCEDURE — 76816 OB US FOLLOW-UP PER FETUS: CPT | Performed by: OBSTETRICS & GYNECOLOGY

## 2024-09-04 ASSESSMENT — FIBROSIS 4 INDEX: FIB4 SCORE: 1.1

## 2024-09-11 ENCOUNTER — HOSPITAL ENCOUNTER (OUTPATIENT)
Dept: LAB | Facility: MEDICAL CENTER | Age: 44
End: 2024-09-11
Attending: MIDWIFE
Payer: COMMERCIAL

## 2024-09-11 DIAGNOSIS — O09.529 ANTEPARTUM MULTIGRAVIDA OF ADVANCED MATERNAL AGE: ICD-10-CM

## 2024-09-11 LAB
BASOPHILS # BLD AUTO: 0.1 % (ref 0–1.8)
BASOPHILS # BLD: 0.01 K/UL (ref 0–0.12)
EOSINOPHIL # BLD AUTO: 0.01 K/UL (ref 0–0.51)
EOSINOPHIL NFR BLD: 0.1 % (ref 0–6.9)
ERYTHROCYTE [DISTWIDTH] IN BLOOD BY AUTOMATED COUNT: 43.3 FL (ref 35.9–50)
GLUCOSE 1H P 50 G GLC PO SERPL-MCNC: 175 MG/DL (ref 70–139)
HCT VFR BLD AUTO: 35.6 % (ref 37–47)
HGB BLD-MCNC: 12.1 G/DL (ref 12–16)
IMM GRANULOCYTES # BLD AUTO: 0.05 K/UL (ref 0–0.11)
IMM GRANULOCYTES NFR BLD AUTO: 0.5 % (ref 0–0.9)
LYMPHOCYTES # BLD AUTO: 2.08 K/UL (ref 1–4.8)
LYMPHOCYTES NFR BLD: 21.6 % (ref 22–41)
MCH RBC QN AUTO: 30.6 PG (ref 27–33)
MCHC RBC AUTO-ENTMCNC: 34 G/DL (ref 32.2–35.5)
MCV RBC AUTO: 89.9 FL (ref 81.4–97.8)
MONOCYTES # BLD AUTO: 0.3 K/UL (ref 0–0.85)
MONOCYTES NFR BLD AUTO: 3.1 % (ref 0–13.4)
NEUTROPHILS # BLD AUTO: 7.2 K/UL (ref 1.82–7.42)
NEUTROPHILS NFR BLD: 74.6 % (ref 44–72)
NRBC # BLD AUTO: 0 K/UL
NRBC BLD-RTO: 0 /100 WBC (ref 0–0.2)
PLATELET # BLD AUTO: 227 K/UL (ref 164–446)
PMV BLD AUTO: 11.3 FL (ref 9–12.9)
RBC # BLD AUTO: 3.96 M/UL (ref 4.2–5.4)
T PALLIDUM AB SER QL IA: NORMAL
WBC # BLD AUTO: 9.7 K/UL (ref 4.8–10.8)

## 2024-09-18 ENCOUNTER — HOSPITAL ENCOUNTER (EMERGENCY)
Facility: MEDICAL CENTER | Age: 44
End: 2024-09-18
Attending: STUDENT IN AN ORGANIZED HEALTH CARE EDUCATION/TRAINING PROGRAM | Admitting: STUDENT IN AN ORGANIZED HEALTH CARE EDUCATION/TRAINING PROGRAM
Payer: MEDICAID

## 2024-09-18 ENCOUNTER — TELEPHONE (OUTPATIENT)
Dept: OBGYN | Facility: CLINIC | Age: 44
End: 2024-09-18

## 2024-09-18 VITALS
SYSTOLIC BLOOD PRESSURE: 98 MMHG | WEIGHT: 179 LBS | DIASTOLIC BLOOD PRESSURE: 55 MMHG | RESPIRATION RATE: 16 BRPM | HEART RATE: 68 BPM | OXYGEN SATURATION: 97 % | TEMPERATURE: 96.5 F | BODY MASS INDEX: 28.09 KG/M2 | HEIGHT: 67 IN

## 2024-09-18 DIAGNOSIS — O99.810 ABNORMAL GLUCOSE AFFECTING PREGNANCY: ICD-10-CM

## 2024-09-18 LAB
APPEARANCE UR: CLEAR
BILIRUB UR QL STRIP.AUTO: NEGATIVE
COLOR UR AUTO: YELLOW
GLUCOSE UR QL STRIP.AUTO: NEGATIVE MG/DL
KETONES UR QL STRIP.AUTO: NEGATIVE MG/DL
LEUKOCYTE ESTERASE UR QL STRIP.AUTO: NEGATIVE
NITRITE UR QL STRIP.AUTO: NEGATIVE
PH UR STRIP.AUTO: 6.5 [PH] (ref 5–8)
PROT UR QL STRIP: NEGATIVE MG/DL
RBC UR QL AUTO: NEGATIVE
SP GR UR STRIP.AUTO: 1.02 (ref 1–1.03)
UROBILINOGEN UR STRIP.AUTO-MCNC: 0.2 MG/DL

## 2024-09-18 PROCEDURE — 81002 URINALYSIS NONAUTO W/O SCOPE: CPT

## 2024-09-18 PROCEDURE — 99282 EMERGENCY DEPT VISIT SF MDM: CPT

## 2024-09-18 PROCEDURE — 59025 FETAL NON-STRESS TEST: CPT

## 2024-09-18 PROCEDURE — 99283 EMERGENCY DEPT VISIT LOW MDM: CPT | Mod: GC | Performed by: FAMILY MEDICINE

## 2024-09-18 ASSESSMENT — FIBROSIS 4 INDEX: FIB4 SCORE: 1.09

## 2024-09-18 NOTE — RESULT ENCOUNTER NOTE
Please notify patient her 1 hour glucose screening was abnormal. She needs a 3 hour glucose test. I will order this now. Please help patient schedule and instruct her in obtaining. Thank you!

## 2024-09-18 NOTE — PROGRESS NOTES
0840- Dr Faith in room to see pt. Order received for discharge    0925- discharge teaching done via . Pt verbalized understanding.

## 2024-09-18 NOTE — PROGRESS NOTES
EDC -  0/7 wks    Pt presents to L&D with c/o left sided abdominal pain. Pt states that she has had pain in this location throughout her pregnancy but over the last week it has become worse. Pt reports good fetal movement, denies vaginal bleeding, leaking of fluid and contractions/cramping. EFM/TOCO applied.

## 2024-09-18 NOTE — TELEPHONE ENCOUNTER
----- Message from Midwife Tomás Matson C.N.M. sent at 9/18/2024  2:27 AM PDT -----  Please notify patient her 1 hour glucose screening was abnormal. She needs a 3 hour glucose test. I will order this now. Please help patient schedule and instruct her in obtaining. Thank you!      Pt notified of abnormal 1hr gtt and need to do 3hr gtt this time. Pt instructed to fast 10-12hr prior to testing. Pt informed she is only allow to drink plain water during fasting time. Advised to bring a snack for after the test is done. Pt notified will be staying in the labs for the 3hr. Pt agreed to do it Friday 9/20/24. Pt verbalized understanding.

## 2024-09-18 NOTE — ED PROVIDER NOTES
OB ED EVALUATION NOTE    SUBJECTIVE:  Jaylene Oneil is a 44 y.o.,  at 30w0d who presents for pelvic pressure. She denies vaginal bleeding, leakage of fluid, or contractions. She states the baby is moving.     She states the pelvic pressure is worse when she is walking up and stairs. She also endorses mild L sided abdominal pain. She denies contractions or cramping.   She states she has had some discharge, however it is white, non odorous and does not cause any irritation. She denies dysuria, urgency or frequency.     Her pregnancy is complicated by AMA, elevated 1h glucola for which she is planning to complete the 3h glucola test, and also +AFP screening. She is following with MFM and fetal US showed fetal pyelectasis, ASD and VSD, and echogenic cardiac focus. She has been counseled on the implications of the +AFP and US findings, she declined NIPT due to financial reasons and wishes to wait until delivery for final diagnosis of T21.     Translation service used: [id number]  I personally reviewed the past medical and surgical histories, as well as the problem list.    OBJECTIVE:  Vital Signs:   Vitals:    24 0730   BP: 98/55   Pulse: 68   Resp: 16   Temp: 35.8 °C (96.5 °F)   SpO2: 97%     GEN: NAD  Abd: soft, NT, gravid  Ext: no edema    Pelvic: deferred    NST read: baseline 135/ moderate variability, + acels, - decels  Foraker: uterine irritability    LABS / IMAGING:  Results for orders placed or performed during the hospital encounter of 24   POCT urinalysis device results   Result Value Ref Range    POC Color Yellow     POC Appearance Clear     POC Glucose Negative Negative mg/dL    POC Ketones Negative Negative mg/dL    POC Specific Gravity 1.020 1.005 - 1.030    POC Blood Negative Negative    POC Urine PH 6.5 5.0 - 8.0    POC Protein Negative Negative mg/dL    Bilirubin Negative Negative    Urobilinogen, Urine 0.2 Negative    POC Nitrites Negative Negative    POC Leukocyte Esterase  Negative Negative         ASSESSMENT AND PLAN:  44 y.o.    Patient Active Problem List    Diagnosis Date Noted    Abnormal glucose affecting pregnancy 2024    Abnormal  ultrasound 2024    Maternal serum screen positive for trisomy 21 07/10/2024    AMA (advanced maternal age) multigravida 35+ 2024     #Pelvic Pressure  -normal pelvic pressure of pregnancy, consistent with relaxation of multigravida pelvic floor muscles. Reassurance provided    #Elevated 1h Glucola  -counseled patient to complete 3h glucola test for evaluation of gestational diabetes      The patient was instructed to follow up in the office as regularly scheduled. She was counseled to call or return for vaginal bleeding, regular contractions, leakage of fluid or decreased fetal movement.    Elsi Faith,   PGY1  [facility]

## 2024-09-23 ENCOUNTER — ROUTINE PRENATAL (OUTPATIENT)
Dept: OBGYN | Facility: CLINIC | Age: 44
End: 2024-09-23
Payer: MEDICAID

## 2024-09-23 ENCOUNTER — APPOINTMENT (OUTPATIENT)
Dept: OBGYN | Facility: CLINIC | Age: 44
End: 2024-09-23
Payer: MEDICAID

## 2024-09-23 VITALS — SYSTOLIC BLOOD PRESSURE: 90 MMHG | DIASTOLIC BLOOD PRESSURE: 40 MMHG | WEIGHT: 179 LBS | BODY MASS INDEX: 28.04 KG/M2

## 2024-09-23 DIAGNOSIS — O09.93 HIGH-RISK PREGNANCY IN THIRD TRIMESTER: ICD-10-CM

## 2024-09-23 PROCEDURE — 3074F SYST BP LT 130 MM HG: CPT

## 2024-09-23 PROCEDURE — 3078F DIAST BP <80 MM HG: CPT

## 2024-09-23 PROCEDURE — 0502F SUBSEQUENT PRENATAL CARE: CPT

## 2024-09-23 ASSESSMENT — FIBROSIS 4 INDEX: FIB4 SCORE: 1.09

## 2024-09-23 NOTE — PROGRESS NOTES
Pt here today for OBFV   +FM movemnet  No VB, LOF. Uc's   Phone number [phone] (home)   Pharmacy verified   US- 10/10   3hr- pt states she will do it sometime this week    Pt was seen at the ER on 9/18 for  pelvic pressure. (States it was resolved but the pressure will come in waves at time)

## 2024-09-23 NOTE — PROGRESS NOTES
S: Pt is a 44 y.o.  at 30w5d gestation here today for routine prenatal care. Pt reports fetal movement; denies cramping, vaginal bleeding, and leaking of fluid. Was seen in ED for lower pelvic pressure though was discharged with precautions and was told that it was d/t baby's position. Continues taking famotidine for acid reflux, baby aspirin daily.     Plans to get 3hr glucose testing done this week.    O: BP 90/40   Wt 179 lb    *see prenatal flowsheet*     A: IUP at 30w5d       S=D         Patient Active Problem List    Diagnosis Date Noted    Abnormal glucose affecting pregnancy 2024    Abnormal  ultrasound 2024    Maternal serum screen positive for trisomy 21 07/10/2024    AMA (advanced maternal age) multigravida 35+ 2024       P:   -Discussed normal s/sx pregnancy appropriate for gestational age general discomforts vs warning signs that necessitate evaluation in OB triage. Reviewed fetal movements appropriate for EGA. Reinforced adequate hydration and nutrition.  -Disc FKC and PTL precautions  -Will have 3hr glucose testing performed  -Has US scheduled with [facility] on 10/10  -RTC in 2 weeks for routine prenatal care      Tennille Turpin C.N.M.

## 2024-10-07 ENCOUNTER — HOSPITAL ENCOUNTER (OUTPATIENT)
Dept: LAB | Facility: MEDICAL CENTER | Age: 44
End: 2024-10-07
Attending: MIDWIFE
Payer: COMMERCIAL

## 2024-10-07 DIAGNOSIS — O99.810 ABNORMAL GLUCOSE AFFECTING PREGNANCY: ICD-10-CM

## 2024-10-08 ENCOUNTER — TELEPHONE (OUTPATIENT)
Dept: OBGYN | Facility: CLINIC | Age: 44
End: 2024-10-08
Payer: MEDICAID

## 2024-10-08 ENCOUNTER — ROUTINE PRENATAL (OUTPATIENT)
Dept: OBGYN | Facility: CLINIC | Age: 44
End: 2024-10-08

## 2024-10-08 VITALS — SYSTOLIC BLOOD PRESSURE: 94 MMHG | DIASTOLIC BLOOD PRESSURE: 50 MMHG | WEIGHT: 180 LBS | BODY MASS INDEX: 28.19 KG/M2

## 2024-10-08 DIAGNOSIS — O24.419 GESTATIONAL DIABETES MELLITUS (GDM) IN THIRD TRIMESTER, GESTATIONAL DIABETES METHOD OF CONTROL UNSPECIFIED: ICD-10-CM

## 2024-10-08 LAB
GLUCOSE 1H P CHAL SERPL-MCNC: 207 MG/DL (ref 65–180)
GLUCOSE 1H P CHAL SERPL-MCNC: NORMAL MG/DL (ref 65–199)
GLUCOSE 2H P CHAL SERPL-MCNC: 176 MG/DL (ref 65–155)
GLUCOSE 2H P CHAL SERPL-MCNC: NORMAL MG/DL (ref 65–139)
GLUCOSE 3H P CHAL SERPL-MCNC: 71 MG/DL (ref 65–140)
GLUCOSE 3H P CHAL SERPL-MCNC: NORMAL MG/DL (ref 65–109)
GLUCOSE BS SERPL-MCNC: 100 MG/DL (ref 65–95)
GLUCOSE BS SERPL-MCNC: NORMAL MG/DL (ref 65–99)

## 2024-10-08 PROCEDURE — 90471 IMMUNIZATION ADMIN: CPT | Performed by: NURSE PRACTITIONER

## 2024-10-08 PROCEDURE — 90715 TDAP VACCINE 7 YRS/> IM: CPT | Mod: JZ | Performed by: NURSE PRACTITIONER

## 2024-10-08 PROCEDURE — 0502F SUBSEQUENT PRENATAL CARE: CPT | Performed by: NURSE PRACTITIONER

## 2024-10-08 ASSESSMENT — FIBROSIS 4 INDEX: FIB4 SCORE: 1.09

## 2024-10-10 ENCOUNTER — HOSPITAL ENCOUNTER (OUTPATIENT)
Dept: LAB | Facility: MEDICAL CENTER | Age: 44
End: 2024-10-10
Attending: NURSE PRACTITIONER
Payer: COMMERCIAL

## 2024-10-10 ENCOUNTER — ANCILLARY PROCEDURE (OUTPATIENT)
Dept: OBGYN | Facility: CLINIC | Age: 44
End: 2024-10-10
Payer: MEDICAID

## 2024-10-10 VITALS
HEART RATE: 77 BPM | WEIGHT: 178.6 LBS | SYSTOLIC BLOOD PRESSURE: 115 MMHG | BODY MASS INDEX: 27.97 KG/M2 | DIASTOLIC BLOOD PRESSURE: 61 MMHG

## 2024-10-10 DIAGNOSIS — O24.419 GESTATIONAL DIABETES MELLITUS (GDM) IN THIRD TRIMESTER, GESTATIONAL DIABETES METHOD OF CONTROL UNSPECIFIED: ICD-10-CM

## 2024-10-10 DIAGNOSIS — O09.529 ANTEPARTUM MULTIGRAVIDA OF ADVANCED MATERNAL AGE: ICD-10-CM

## 2024-10-10 LAB
EST. AVERAGE GLUCOSE BLD GHB EST-MCNC: 146 MG/DL
HBA1C MFR BLD: 6.7 % (ref 4–5.6)

## 2024-10-10 PROCEDURE — 76816 OB US FOLLOW-UP PER FETUS: CPT | Performed by: OBSTETRICS & GYNECOLOGY

## 2024-10-10 ASSESSMENT — FIBROSIS 4 INDEX: FIB4 SCORE: 1.09

## 2024-10-14 ENCOUNTER — TELEPHONE (OUTPATIENT)
Dept: OBGYN | Facility: CLINIC | Age: 44
End: 2024-10-14
Payer: MEDICAID

## 2024-10-15 ENCOUNTER — NON-PROVIDER VISIT (OUTPATIENT)
Dept: OBGYN | Facility: CLINIC | Age: 44
End: 2024-10-15
Payer: MEDICAID

## 2024-10-15 VITALS
BODY MASS INDEX: 28.25 KG/M2 | HEART RATE: 72 BPM | DIASTOLIC BLOOD PRESSURE: 57 MMHG | WEIGHT: 180 LBS | HEIGHT: 67 IN | SYSTOLIC BLOOD PRESSURE: 99 MMHG

## 2024-10-15 DIAGNOSIS — O24.419 GESTATIONAL DIABETES MELLITUS (GDM) IN THIRD TRIMESTER, GESTATIONAL DIABETES METHOD OF CONTROL UNSPECIFIED: ICD-10-CM

## 2024-10-15 PROCEDURE — G0109 DIAB MANAGE TRN IND/GROUP: HCPCS | Performed by: MIDWIFE

## 2024-10-15 ASSESSMENT — FIBROSIS 4 INDEX: FIB4 SCORE: 1.09

## 2024-10-24 ENCOUNTER — ROUTINE PRENATAL (OUTPATIENT)
Dept: OBGYN | Facility: CLINIC | Age: 44
End: 2024-10-24
Payer: MEDICAID

## 2024-10-24 VITALS — SYSTOLIC BLOOD PRESSURE: 102 MMHG | WEIGHT: 181 LBS | BODY MASS INDEX: 28.35 KG/M2 | DIASTOLIC BLOOD PRESSURE: 54 MMHG

## 2024-10-24 DIAGNOSIS — O24.419 GESTATIONAL DIABETES MELLITUS (GDM) IN THIRD TRIMESTER, GESTATIONAL DIABETES METHOD OF CONTROL UNSPECIFIED: Primary | ICD-10-CM

## 2024-10-24 LAB — GLUCOSE BLD-MCNC: 123 MG/DL (ref 65–99)

## 2024-10-24 PROCEDURE — 96381 ADMN RSV MONOC ANTB IM NJX: CPT | Performed by: OBSTETRICS & GYNECOLOGY

## 2024-10-24 PROCEDURE — 90678 RSV VACC PREF BIVALENT IM: CPT | Performed by: OBSTETRICS & GYNECOLOGY

## 2024-10-24 PROCEDURE — 82962 GLUCOSE BLOOD TEST: CPT | Performed by: OBSTETRICS & GYNECOLOGY

## 2024-10-24 PROCEDURE — 99213 OFFICE O/P EST LOW 20 MIN: CPT | Mod: 25 | Performed by: OBSTETRICS & GYNECOLOGY

## 2024-10-24 PROCEDURE — 59025 FETAL NON-STRESS TEST: CPT | Performed by: OBSTETRICS & GYNECOLOGY

## 2024-10-24 ASSESSMENT — FIBROSIS 4 INDEX: FIB4 SCORE: 1.09

## 2024-10-29 ENCOUNTER — TELEPHONE (OUTPATIENT)
Dept: OBGYN | Facility: CLINIC | Age: 44
End: 2024-10-29
Payer: MEDICAID

## 2024-10-31 ENCOUNTER — ROUTINE PRENATAL (OUTPATIENT)
Dept: OBGYN | Facility: CLINIC | Age: 44
End: 2024-10-31
Payer: MEDICAID

## 2024-10-31 ENCOUNTER — HOSPITAL ENCOUNTER (OUTPATIENT)
Facility: MEDICAL CENTER | Age: 44
End: 2024-10-31
Attending: STUDENT IN AN ORGANIZED HEALTH CARE EDUCATION/TRAINING PROGRAM
Payer: MEDICAID

## 2024-10-31 VITALS — WEIGHT: 181 LBS | DIASTOLIC BLOOD PRESSURE: 74 MMHG | SYSTOLIC BLOOD PRESSURE: 96 MMHG | BODY MASS INDEX: 28.35 KG/M2

## 2024-10-31 DIAGNOSIS — O09.529 AMA (ADVANCED MATERNAL AGE) MULTIGRAVIDA 35+, UNSPECIFIED TRIMESTER: ICD-10-CM

## 2024-10-31 DIAGNOSIS — O24.419 GESTATIONAL DIABETES MELLITUS (GDM) IN THIRD TRIMESTER, GESTATIONAL DIABETES METHOD OF CONTROL UNSPECIFIED: ICD-10-CM

## 2024-10-31 DIAGNOSIS — O28.3 ABNORMAL ANTENATAL ULTRASOUND: ICD-10-CM

## 2024-10-31 DIAGNOSIS — Z23 FLU VACCINE NEED: ICD-10-CM

## 2024-10-31 PROCEDURE — 87150 DNA/RNA AMPLIFIED PROBE: CPT

## 2024-10-31 PROCEDURE — 87081 CULTURE SCREEN ONLY: CPT

## 2024-10-31 ASSESSMENT — FIBROSIS 4 INDEX: FIB4 SCORE: 1.09

## 2024-11-03 LAB — GP B STREP DNA SPEC QL NAA+PROBE: NEGATIVE

## 2024-11-07 ENCOUNTER — NON-PROVIDER VISIT (OUTPATIENT)
Dept: OBGYN | Facility: CLINIC | Age: 44
End: 2024-11-07
Payer: MEDICAID

## 2024-11-07 ENCOUNTER — ROUTINE PRENATAL (OUTPATIENT)
Dept: OBGYN | Facility: CLINIC | Age: 44
End: 2024-11-07
Payer: MEDICAID

## 2024-11-07 VITALS — BODY MASS INDEX: 28.51 KG/M2 | DIASTOLIC BLOOD PRESSURE: 60 MMHG | SYSTOLIC BLOOD PRESSURE: 102 MMHG | WEIGHT: 182 LBS

## 2024-11-07 DIAGNOSIS — O24.410 DIET CONTROLLED GESTATIONAL DIABETES MELLITUS (GDM) IN THIRD TRIMESTER: ICD-10-CM

## 2024-11-07 DIAGNOSIS — O09.522 AMA (ADVANCED MATERNAL AGE) MULTIGRAVIDA 35+, SECOND TRIMESTER: ICD-10-CM

## 2024-11-07 PROCEDURE — 59025 FETAL NON-STRESS TEST: CPT | Performed by: OBSTETRICS & GYNECOLOGY

## 2024-11-07 ASSESSMENT — FIBROSIS 4 INDEX: FIB4 SCORE: 1.09

## 2024-11-08 NOTE — PROGRESS NOTES
No chief complaint on file.    Patient ID [id number]   Jaylene Oneil is a 44 y.o.  at 37w1d with a working estimated date of delivery of 2024, by Last Menstrual Period who presents for a routine prenatal visit. She denies vaginal bleeding, leakage of fluid, decreased fetal movements, or contractions.    Her pregnancy is complicated by:  Gestational DM  Patient did not bring her log book. States the infant is not moving as much. NST done whic was reactive.    Average fasting glucose: N/A  Average 1 hour postprandial: N/A    Objective     Physical Exam  Weight: 182 lb  Expected Total Weight Gain: Could not be calculated   Pregravid BMI: Could not be calculated  Blood Pressure: 102/60       ABD: Soft, gravid, non tender  FHR: 130's      Assessment & Plan   37w1d IUP    Gestational Diabetes-Diet  Patient instructed on kick counts and advised to come to hospital if the kick count is low.    Glucose Monitoring  -continue daily home glucose monitoring with the following targets:   Targets   Fasting <95   1 hr PP <130-140   2 hr PP <120   -symptoms of hypoglycemia and hyperglycemia reviewed  -call parameters discussed    Follow up in 1 week for a routine prenatal visit.    Bam Powell MD

## 2024-11-08 NOTE — PROGRESS NOTES
Pt here for diabetic OB exam  Pt states baby is barely moving, she is only getting 4 kicks in 2 hours.   +FM  Pharmacy confirmed   Diabetic supplies:  Random Accucheck:

## 2024-11-10 ENCOUNTER — HOSPITAL ENCOUNTER (INPATIENT)
Facility: MEDICAL CENTER | Age: 44
LOS: 3 days | End: 2024-11-14
Attending: OBSTETRICS & GYNECOLOGY | Admitting: OBSTETRICS & GYNECOLOGY
Payer: MEDICAID

## 2024-11-10 ENCOUNTER — APPOINTMENT (OUTPATIENT)
Dept: RADIOLOGY | Facility: MEDICAL CENTER | Age: 44
End: 2024-11-10
Attending: PHYSICIAN ASSISTANT
Payer: MEDICAID

## 2024-11-10 DIAGNOSIS — Z98.891 S/P CESAREAN SECTION: ICD-10-CM

## 2024-11-10 PROCEDURE — G0378 HOSPITAL OBSERVATION PER HR: HCPCS

## 2024-11-10 PROCEDURE — 76819 FETAL BIOPHYS PROFIL W/O NST: CPT

## 2024-11-10 PROCEDURE — 99284 EMERGENCY DEPT VISIT MOD MDM: CPT

## 2024-11-10 PROCEDURE — 770002 HCHG ROOM/CARE - OB PRIVATE (112)

## 2024-11-10 ASSESSMENT — PATIENT HEALTH QUESTIONNAIRE - PHQ9
2. FEELING DOWN, DEPRESSED, IRRITABLE, OR HOPELESS: NOT AT ALL
1. LITTLE INTEREST OR PLEASURE IN DOING THINGS: NOT AT ALL
SUM OF ALL RESPONSES TO PHQ9 QUESTIONS 1 AND 2: 0

## 2024-11-10 ASSESSMENT — PAIN SCALES - GENERAL: PAINLEVEL: 0 - NO PAIN

## 2024-11-10 ASSESSMENT — FIBROSIS 4 INDEX: FIB4 SCORE: 1.09

## 2024-11-11 ENCOUNTER — ANESTHESIA (OUTPATIENT)
Dept: OBGYN | Facility: MEDICAL CENTER | Age: 44
End: 2024-11-11
Payer: MEDICAID

## 2024-11-11 ENCOUNTER — ANESTHESIA EVENT (OUTPATIENT)
Dept: OBGYN | Facility: MEDICAL CENTER | Age: 44
End: 2024-11-11
Payer: MEDICAID

## 2024-11-11 ENCOUNTER — APPOINTMENT (OUTPATIENT)
Dept: RADIOLOGY | Facility: MEDICAL CENTER | Age: 44
End: 2024-11-11
Attending: OBSTETRICS & GYNECOLOGY
Payer: MEDICAID

## 2024-11-11 LAB
BASOPHILS # BLD AUTO: 0.3 % (ref 0–1.8)
BASOPHILS # BLD: 0.03 K/UL (ref 0–0.12)
EOSINOPHIL # BLD AUTO: 0.01 K/UL (ref 0–0.51)
EOSINOPHIL NFR BLD: 0.1 % (ref 0–6.9)
ERYTHROCYTE [DISTWIDTH] IN BLOOD BY AUTOMATED COUNT: 41.7 FL (ref 35.9–50)
ERYTHROCYTE [DISTWIDTH] IN BLOOD BY AUTOMATED COUNT: 43.4 FL (ref 35.9–50)
HCT VFR BLD AUTO: 35.5 % (ref 37–47)
HCT VFR BLD AUTO: 38.9 % (ref 37–47)
HGB BLD-MCNC: 12.1 G/DL (ref 12–16)
HGB BLD-MCNC: 13.3 G/DL (ref 12–16)
HOLDING TUBE BB 8507: NORMAL
IMM GRANULOCYTES # BLD AUTO: 0.02 K/UL (ref 0–0.11)
IMM GRANULOCYTES NFR BLD AUTO: 0.2 % (ref 0–0.9)
LYMPHOCYTES # BLD AUTO: 2.85 K/UL (ref 1–4.8)
LYMPHOCYTES NFR BLD: 28.9 % (ref 22–41)
MCH RBC QN AUTO: 29.2 PG (ref 27–33)
MCH RBC QN AUTO: 30 PG (ref 27–33)
MCHC RBC AUTO-ENTMCNC: 34.1 G/DL (ref 32.2–35.5)
MCHC RBC AUTO-ENTMCNC: 34.2 G/DL (ref 32.2–35.5)
MCV RBC AUTO: 85.7 FL (ref 81.4–97.8)
MCV RBC AUTO: 87.6 FL (ref 81.4–97.8)
MONOCYTES # BLD AUTO: 0.5 K/UL (ref 0–0.85)
MONOCYTES NFR BLD AUTO: 5.1 % (ref 0–13.4)
NEUTROPHILS # BLD AUTO: 6.45 K/UL (ref 1.82–7.42)
NEUTROPHILS NFR BLD: 65.4 % (ref 44–72)
NRBC # BLD AUTO: 0 K/UL
NRBC BLD-RTO: 0 /100 WBC (ref 0–0.2)
PLATELET # BLD AUTO: 211 K/UL (ref 164–446)
PLATELET # BLD AUTO: 216 K/UL (ref 164–446)
PMV BLD AUTO: 10.8 FL (ref 9–12.9)
PMV BLD AUTO: 11.2 FL (ref 9–12.9)
RBC # BLD AUTO: 4.14 M/UL (ref 4.2–5.4)
RBC # BLD AUTO: 4.44 M/UL (ref 4.2–5.4)
T PALLIDUM AB SER QL IA: NONREACTIVE
WBC # BLD AUTO: 14.8 K/UL (ref 4.8–10.8)
WBC # BLD AUTO: 9.9 K/UL (ref 4.8–10.8)

## 2024-11-11 PROCEDURE — 700105 HCHG RX REV CODE 258: Performed by: OBSTETRICS & GYNECOLOGY

## 2024-11-11 PROCEDURE — 160029 HCHG SURGERY MINUTES - 1ST 30 MINS LEVEL 4: Performed by: OBSTETRICS & GYNECOLOGY

## 2024-11-11 PROCEDURE — 160041 HCHG SURGERY MINUTES - EA ADDL 1 MIN LEVEL 4: Performed by: OBSTETRICS & GYNECOLOGY

## 2024-11-11 PROCEDURE — 88307 TISSUE EXAM BY PATHOLOGIST: CPT

## 2024-11-11 PROCEDURE — 700111 HCHG RX REV CODE 636 W/ 250 OVERRIDE (IP): Performed by: OBSTETRICS & GYNECOLOGY

## 2024-11-11 PROCEDURE — 160035 HCHG PACU - 1ST 60 MINS PHASE I: Performed by: OBSTETRICS & GYNECOLOGY

## 2024-11-11 PROCEDURE — 85027 COMPLETE CBC AUTOMATED: CPT

## 2024-11-11 PROCEDURE — 700111 HCHG RX REV CODE 636 W/ 250 OVERRIDE (IP): Performed by: STUDENT IN AN ORGANIZED HEALTH CARE EDUCATION/TRAINING PROGRAM

## 2024-11-11 PROCEDURE — 36415 COLL VENOUS BLD VENIPUNCTURE: CPT

## 2024-11-11 PROCEDURE — 0UT70ZZ RESECTION OF BILATERAL FALLOPIAN TUBES, OPEN APPROACH: ICD-10-PCS | Performed by: OBSTETRICS & GYNECOLOGY

## 2024-11-11 PROCEDURE — 59514 CESAREAN DELIVERY ONLY: CPT | Mod: 80 | Performed by: STUDENT IN AN ORGANIZED HEALTH CARE EDUCATION/TRAINING PROGRAM

## 2024-11-11 PROCEDURE — 700102 HCHG RX REV CODE 250 W/ 637 OVERRIDE(OP): Performed by: STUDENT IN AN ORGANIZED HEALTH CARE EDUCATION/TRAINING PROGRAM

## 2024-11-11 PROCEDURE — C1755 CATHETER, INTRASPINAL: HCPCS | Performed by: OBSTETRICS & GYNECOLOGY

## 2024-11-11 PROCEDURE — 160002 HCHG RECOVERY MINUTES (STAT): Performed by: OBSTETRICS & GYNECOLOGY

## 2024-11-11 PROCEDURE — 58611 LIGATE OVIDUCT(S) ADD-ON: CPT | Mod: 80 | Performed by: STUDENT IN AN ORGANIZED HEALTH CARE EDUCATION/TRAINING PROGRAM

## 2024-11-11 PROCEDURE — 160048 HCHG OR STATISTICAL LEVEL 1-5: Performed by: OBSTETRICS & GYNECOLOGY

## 2024-11-11 PROCEDURE — 59514 CESAREAN DELIVERY ONLY: CPT | Performed by: OBSTETRICS & GYNECOLOGY

## 2024-11-11 PROCEDURE — 86780 TREPONEMA PALLIDUM: CPT

## 2024-11-11 PROCEDURE — A9270 NON-COVERED ITEM OR SERVICE: HCPCS | Performed by: STUDENT IN AN ORGANIZED HEALTH CARE EDUCATION/TRAINING PROGRAM

## 2024-11-11 PROCEDURE — 160009 HCHG ANES TIME/MIN: Performed by: OBSTETRICS & GYNECOLOGY

## 2024-11-11 PROCEDURE — 88302 TISSUE EXAM BY PATHOLOGIST: CPT

## 2024-11-11 PROCEDURE — 700105 HCHG RX REV CODE 258: Performed by: STUDENT IN AN ORGANIZED HEALTH CARE EDUCATION/TRAINING PROGRAM

## 2024-11-11 PROCEDURE — 160036 HCHG PACU - EA ADDL 30 MINS PHASE I: Performed by: OBSTETRICS & GYNECOLOGY

## 2024-11-11 PROCEDURE — 770002 HCHG ROOM/CARE - OB PRIVATE (112)

## 2024-11-11 PROCEDURE — 58611 LIGATE OVIDUCT(S) ADD-ON: CPT | Performed by: OBSTETRICS & GYNECOLOGY

## 2024-11-11 PROCEDURE — 85025 COMPLETE CBC W/AUTO DIFF WBC: CPT

## 2024-11-11 RX ORDER — ONDANSETRON 2 MG/ML
4 INJECTION INTRAMUSCULAR; INTRAVENOUS EVERY 6 HOURS PRN
Status: DISCONTINUED | OUTPATIENT
Start: 2024-11-12 | End: 2024-11-14 | Stop reason: HOSPADM

## 2024-11-11 RX ORDER — ALBUTEROL SULFATE 5 MG/ML
2.5 SOLUTION RESPIRATORY (INHALATION)
Status: DISCONTINUED | OUTPATIENT
Start: 2024-11-11 | End: 2024-11-11 | Stop reason: HOSPADM

## 2024-11-11 RX ORDER — HYDROMORPHONE HYDROCHLORIDE 1 MG/ML
0.5 INJECTION, SOLUTION INTRAMUSCULAR; INTRAVENOUS; SUBCUTANEOUS
Status: DISCONTINUED | OUTPATIENT
Start: 2024-11-11 | End: 2024-11-11 | Stop reason: HOSPADM

## 2024-11-11 RX ORDER — KETOROLAC TROMETHAMINE 15 MG/ML
INJECTION, SOLUTION INTRAMUSCULAR; INTRAVENOUS PRN
Status: DISCONTINUED | OUTPATIENT
Start: 2024-11-11 | End: 2024-11-11 | Stop reason: SURG

## 2024-11-11 RX ORDER — ONDANSETRON 2 MG/ML
INJECTION INTRAMUSCULAR; INTRAVENOUS PRN
Status: DISCONTINUED | OUTPATIENT
Start: 2024-11-11 | End: 2024-11-11 | Stop reason: SURG

## 2024-11-11 RX ORDER — OXYCODONE HCL 5 MG/5 ML
5 SOLUTION, ORAL ORAL
Status: DISCONTINUED | OUTPATIENT
Start: 2024-11-11 | End: 2024-11-11 | Stop reason: HOSPADM

## 2024-11-11 RX ORDER — OXYTOCIN 10 [USP'U]/ML
INJECTION, SOLUTION INTRAMUSCULAR; INTRAVENOUS PRN
Status: DISCONTINUED | OUTPATIENT
Start: 2024-11-11 | End: 2024-11-11 | Stop reason: SURG

## 2024-11-11 RX ORDER — OXYCODONE HYDROCHLORIDE 5 MG/1
5 TABLET ORAL EVERY 4 HOURS PRN
Status: DISCONTINUED | OUTPATIENT
Start: 2024-11-12 | End: 2024-11-14 | Stop reason: HOSPADM

## 2024-11-11 RX ORDER — OXYCODONE HCL 5 MG/5 ML
10 SOLUTION, ORAL ORAL
Status: DISCONTINUED | OUTPATIENT
Start: 2024-11-11 | End: 2024-11-11 | Stop reason: HOSPADM

## 2024-11-11 RX ORDER — ONDANSETRON 2 MG/ML
4 INJECTION INTRAMUSCULAR; INTRAVENOUS EVERY 6 HOURS PRN
Status: ACTIVE | OUTPATIENT
Start: 2024-11-11 | End: 2024-11-12

## 2024-11-11 RX ORDER — DIPHENHYDRAMINE HYDROCHLORIDE 50 MG/ML
25 INJECTION INTRAMUSCULAR; INTRAVENOUS EVERY 6 HOURS PRN
Status: ACTIVE | OUTPATIENT
Start: 2024-11-11 | End: 2024-11-12

## 2024-11-11 RX ORDER — HYDROMORPHONE HYDROCHLORIDE 1 MG/ML
0.2 INJECTION, SOLUTION INTRAMUSCULAR; INTRAVENOUS; SUBCUTANEOUS
Status: DISCONTINUED | OUTPATIENT
Start: 2024-11-11 | End: 2024-11-11 | Stop reason: HOSPADM

## 2024-11-11 RX ORDER — METOCLOPRAMIDE HYDROCHLORIDE 5 MG/ML
10 INJECTION INTRAMUSCULAR; INTRAVENOUS ONCE
Status: COMPLETED | OUTPATIENT
Start: 2024-11-11 | End: 2024-11-11

## 2024-11-11 RX ORDER — SIMETHICONE 125 MG
125 TABLET,CHEWABLE ORAL 4 TIMES DAILY PRN
Status: DISCONTINUED | OUTPATIENT
Start: 2024-11-11 | End: 2024-11-14 | Stop reason: HOSPADM

## 2024-11-11 RX ORDER — HYDRALAZINE HYDROCHLORIDE 20 MG/ML
5 INJECTION INTRAMUSCULAR; INTRAVENOUS
Status: DISCONTINUED | OUTPATIENT
Start: 2024-11-11 | End: 2024-11-11 | Stop reason: HOSPADM

## 2024-11-11 RX ORDER — MORPHINE SULFATE 0.5 MG/ML
INJECTION, SOLUTION EPIDURAL; INTRATHECAL; INTRAVENOUS
Status: COMPLETED | OUTPATIENT
Start: 2024-11-11 | End: 2024-11-11

## 2024-11-11 RX ORDER — CALCIUM CARBONATE 500 MG/1
1000 TABLET, CHEWABLE ORAL EVERY 6 HOURS PRN
Status: DISCONTINUED | OUTPATIENT
Start: 2024-11-11 | End: 2024-11-14 | Stop reason: HOSPADM

## 2024-11-11 RX ORDER — DIPHENHYDRAMINE HYDROCHLORIDE 50 MG/ML
12.5 INJECTION INTRAMUSCULAR; INTRAVENOUS
Status: DISCONTINUED | OUTPATIENT
Start: 2024-11-11 | End: 2024-11-11 | Stop reason: HOSPADM

## 2024-11-11 RX ORDER — ACETAMINOPHEN 500 MG
1000 TABLET ORAL EVERY 6 HOURS PRN
Status: DISCONTINUED | OUTPATIENT
Start: 2024-11-15 | End: 2024-11-14 | Stop reason: HOSPADM

## 2024-11-11 RX ORDER — DEXAMETHASONE SODIUM PHOSPHATE 4 MG/ML
INJECTION, SOLUTION INTRA-ARTICULAR; INTRALESIONAL; INTRAMUSCULAR; INTRAVENOUS; SOFT TISSUE PRN
Status: DISCONTINUED | OUTPATIENT
Start: 2024-11-11 | End: 2024-11-11 | Stop reason: SURG

## 2024-11-11 RX ORDER — DIPHENHYDRAMINE HYDROCHLORIDE 50 MG/ML
25 INJECTION INTRAMUSCULAR; INTRAVENOUS EVERY 6 HOURS PRN
Status: DISCONTINUED | OUTPATIENT
Start: 2024-11-12 | End: 2024-11-14 | Stop reason: HOSPADM

## 2024-11-11 RX ORDER — OXYCODONE HYDROCHLORIDE 10 MG/1
10 TABLET ORAL EVERY 4 HOURS PRN
Status: DISCONTINUED | OUTPATIENT
Start: 2024-11-12 | End: 2024-11-14 | Stop reason: HOSPADM

## 2024-11-11 RX ORDER — ONDANSETRON 4 MG/1
4 TABLET, ORALLY DISINTEGRATING ORAL EVERY 6 HOURS PRN
Status: DISCONTINUED | OUTPATIENT
Start: 2024-11-12 | End: 2024-11-14 | Stop reason: HOSPADM

## 2024-11-11 RX ORDER — MEPERIDINE HYDROCHLORIDE 25 MG/ML
6.25 INJECTION INTRAMUSCULAR; INTRAVENOUS; SUBCUTANEOUS
Status: DISCONTINUED | OUTPATIENT
Start: 2024-11-11 | End: 2024-11-11 | Stop reason: HOSPADM

## 2024-11-11 RX ORDER — IBUPROFEN 800 MG/1
800 TABLET, FILM COATED ORAL EVERY 8 HOURS PRN
Status: DISCONTINUED | OUTPATIENT
Start: 2024-11-15 | End: 2024-11-14 | Stop reason: HOSPADM

## 2024-11-11 RX ORDER — SODIUM CHLORIDE, SODIUM GLUCONATE, SODIUM ACETATE, POTASSIUM CHLORIDE AND MAGNESIUM CHLORIDE 526; 502; 368; 37; 30 MG/100ML; MG/100ML; MG/100ML; MG/100ML; MG/100ML
1000 INJECTION, SOLUTION INTRAVENOUS ONCE
Status: COMPLETED | OUTPATIENT
Start: 2024-11-11 | End: 2024-11-12

## 2024-11-11 RX ORDER — HYDROMORPHONE HYDROCHLORIDE 1 MG/ML
0.1 INJECTION, SOLUTION INTRAMUSCULAR; INTRAVENOUS; SUBCUTANEOUS
Status: DISCONTINUED | OUTPATIENT
Start: 2024-11-11 | End: 2024-11-11 | Stop reason: HOSPADM

## 2024-11-11 RX ORDER — IBUPROFEN 800 MG/1
800 TABLET, FILM COATED ORAL EVERY 8 HOURS
Status: DISCONTINUED | OUTPATIENT
Start: 2024-11-12 | End: 2024-11-14 | Stop reason: HOSPADM

## 2024-11-11 RX ORDER — SODIUM CHLORIDE 9 MG/ML
INJECTION, SOLUTION INTRAVENOUS ONCE
Status: ACTIVE | OUTPATIENT
Start: 2024-11-11 | End: 2024-11-14

## 2024-11-11 RX ORDER — HYDROMORPHONE HYDROCHLORIDE 1 MG/ML
0.4 INJECTION, SOLUTION INTRAMUSCULAR; INTRAVENOUS; SUBCUTANEOUS
Status: ACTIVE | OUTPATIENT
Start: 2024-11-11 | End: 2024-11-12

## 2024-11-11 RX ORDER — ACETAMINOPHEN 500 MG
1000 TABLET ORAL EVERY 6 HOURS
Status: DISCONTINUED | OUTPATIENT
Start: 2024-11-12 | End: 2024-11-14 | Stop reason: HOSPADM

## 2024-11-11 RX ORDER — HYDROMORPHONE HYDROCHLORIDE 1 MG/ML
0.2 INJECTION, SOLUTION INTRAMUSCULAR; INTRAVENOUS; SUBCUTANEOUS
Status: ACTIVE | OUTPATIENT
Start: 2024-11-11 | End: 2024-11-12

## 2024-11-11 RX ORDER — VITAMIN A ACETATE, BETA CAROTENE, ASCORBIC ACID, CHOLECALCIFEROL, .ALPHA.-TOCOPHEROL ACETATE, DL-, THIAMINE MONONITRATE, RIBOFLAVIN, NIACINAMIDE, PYRIDOXINE HYDROCHLORIDE, FOLIC ACID, CYANOCOBALAMIN, CALCIUM CARBONATE, FERROUS FUMARATE, ZINC OXIDE, CUPRIC OXIDE 3080; 12; 120; 400; 1; 1.84; 3; 20; 22; 920; 25; 200; 27; 10; 2 [IU]/1; UG/1; MG/1; [IU]/1; MG/1; MG/1; MG/1; MG/1; MG/1; [IU]/1; MG/1; MG/1; MG/1; MG/1; MG/1
1 TABLET, FILM COATED ORAL
Status: DISCONTINUED | OUTPATIENT
Start: 2024-11-12 | End: 2024-11-14 | Stop reason: HOSPADM

## 2024-11-11 RX ORDER — BUPIVACAINE HYDROCHLORIDE 7.5 MG/ML
INJECTION, SOLUTION INTRASPINAL
Status: COMPLETED | OUTPATIENT
Start: 2024-11-11 | End: 2024-11-11

## 2024-11-11 RX ORDER — ACETAMINOPHEN 500 MG
1000 TABLET ORAL EVERY 6 HOURS
Status: COMPLETED | OUTPATIENT
Start: 2024-11-11 | End: 2024-11-12

## 2024-11-11 RX ORDER — DOCUSATE SODIUM 100 MG/1
100 CAPSULE, LIQUID FILLED ORAL 2 TIMES DAILY PRN
Status: DISCONTINUED | OUTPATIENT
Start: 2024-11-11 | End: 2024-11-14 | Stop reason: HOSPADM

## 2024-11-11 RX ORDER — EPHEDRINE SULFATE 50 MG/ML
5 INJECTION, SOLUTION INTRAVENOUS
Status: DISCONTINUED | OUTPATIENT
Start: 2024-11-11 | End: 2024-11-11 | Stop reason: HOSPADM

## 2024-11-11 RX ORDER — CITRIC ACID/SODIUM CITRATE 334-500MG
30 SOLUTION, ORAL ORAL ONCE
Status: COMPLETED | OUTPATIENT
Start: 2024-11-11 | End: 2024-11-11

## 2024-11-11 RX ORDER — ONDANSETRON 2 MG/ML
4 INJECTION INTRAMUSCULAR; INTRAVENOUS
Status: DISCONTINUED | OUTPATIENT
Start: 2024-11-11 | End: 2024-11-11 | Stop reason: HOSPADM

## 2024-11-11 RX ORDER — KETOROLAC TROMETHAMINE 15 MG/ML
15 INJECTION, SOLUTION INTRAMUSCULAR; INTRAVENOUS EVERY 6 HOURS
Status: COMPLETED | OUTPATIENT
Start: 2024-11-11 | End: 2024-11-12

## 2024-11-11 RX ORDER — CEFAZOLIN SODIUM 1 G/3ML
2 INJECTION, POWDER, FOR SOLUTION INTRAMUSCULAR; INTRAVENOUS ONCE
Status: COMPLETED | OUTPATIENT
Start: 2024-11-11 | End: 2024-11-11

## 2024-11-11 RX ORDER — DIPHENHYDRAMINE HYDROCHLORIDE 50 MG/ML
12.5 INJECTION INTRAMUSCULAR; INTRAVENOUS EVERY 6 HOURS PRN
Status: ACTIVE | OUTPATIENT
Start: 2024-11-11 | End: 2024-11-12

## 2024-11-11 RX ORDER — DIPHENHYDRAMINE HYDROCHLORIDE 50 MG/ML
12.5 INJECTION INTRAMUSCULAR; INTRAVENOUS EVERY 6 HOURS PRN
Status: DISPENSED | OUTPATIENT
Start: 2024-11-11 | End: 2024-11-12

## 2024-11-11 RX ORDER — SODIUM CHLORIDE, SODIUM GLUCONATE, SODIUM ACETATE, POTASSIUM CHLORIDE AND MAGNESIUM CHLORIDE 526; 502; 368; 37; 30 MG/100ML; MG/100ML; MG/100ML; MG/100ML; MG/100ML
INJECTION, SOLUTION INTRAVENOUS
Status: DISCONTINUED | OUTPATIENT
Start: 2024-11-11 | End: 2024-11-11 | Stop reason: SURG

## 2024-11-11 RX ORDER — EPHEDRINE SULFATE 50 MG/ML
10 INJECTION, SOLUTION INTRAVENOUS
Status: ACTIVE | OUTPATIENT
Start: 2024-11-11 | End: 2024-11-12

## 2024-11-11 RX ORDER — PHENYLEPHRINE HYDROCHLORIDE 10 MG/ML
INJECTION, SOLUTION INTRAMUSCULAR; INTRAVENOUS; SUBCUTANEOUS PRN
Status: DISCONTINUED | OUTPATIENT
Start: 2024-11-11 | End: 2024-11-11 | Stop reason: SURG

## 2024-11-11 RX ORDER — OXYTOCIN 10 [USP'U]/ML
10 INJECTION, SOLUTION INTRAMUSCULAR; INTRAVENOUS
Status: DISCONTINUED | OUTPATIENT
Start: 2024-11-11 | End: 2024-11-14 | Stop reason: HOSPADM

## 2024-11-11 RX ORDER — GLYCOPYRROLATE 0.2 MG/ML
INJECTION INTRAMUSCULAR; INTRAVENOUS PRN
Status: DISCONTINUED | OUTPATIENT
Start: 2024-11-11 | End: 2024-11-11 | Stop reason: SURG

## 2024-11-11 RX ORDER — SODIUM CHLORIDE 9 MG/ML
INJECTION, SOLUTION INTRAVENOUS CONTINUOUS
Status: DISCONTINUED | OUTPATIENT
Start: 2024-11-11 | End: 2024-11-11

## 2024-11-11 RX ORDER — OXYCODONE HYDROCHLORIDE 10 MG/1
10 TABLET ORAL EVERY 4 HOURS PRN
Status: ACTIVE | OUTPATIENT
Start: 2024-11-11 | End: 2024-11-12

## 2024-11-11 RX ORDER — HALOPERIDOL 5 MG/ML
1 INJECTION INTRAMUSCULAR
Status: DISCONTINUED | OUTPATIENT
Start: 2024-11-11 | End: 2024-11-11 | Stop reason: HOSPADM

## 2024-11-11 RX ORDER — OXYCODONE HYDROCHLORIDE 5 MG/1
5 TABLET ORAL EVERY 4 HOURS PRN
Status: ACTIVE | OUTPATIENT
Start: 2024-11-11 | End: 2024-11-12

## 2024-11-11 RX ORDER — SODIUM CHLORIDE 9 MG/ML
1000 INJECTION, SOLUTION INTRAVENOUS ONCE
Status: DISCONTINUED | OUTPATIENT
Start: 2024-11-11 | End: 2024-11-11 | Stop reason: HOSPADM

## 2024-11-11 RX ORDER — DIPHENHYDRAMINE HCL 25 MG
25 TABLET ORAL EVERY 6 HOURS PRN
Status: DISCONTINUED | OUTPATIENT
Start: 2024-11-12 | End: 2024-11-14 | Stop reason: HOSPADM

## 2024-11-11 RX ADMIN — ONDANSETRON 4 MG: 2 INJECTION INTRAMUSCULAR; INTRAVENOUS at 11:58

## 2024-11-11 RX ADMIN — FAMOTIDINE 20 MG: 10 INJECTION, SOLUTION INTRAVENOUS at 10:23

## 2024-11-11 RX ADMIN — PHENYLEPHRINE HYDROCHLORIDE 200 MCG: 10 INJECTION INTRAVENOUS at 11:17

## 2024-11-11 RX ADMIN — METOCLOPRAMIDE 10 MG: 5 INJECTION, SOLUTION INTRAMUSCULAR; INTRAVENOUS at 10:23

## 2024-11-11 RX ADMIN — OXYTOCIN 30 UNITS: 10 INJECTION, SOLUTION INTRAMUSCULAR; INTRAVENOUS at 11:35

## 2024-11-11 RX ADMIN — MORPHINE SULFATE 200 MCG: 0.5 INJECTION, SOLUTION EPIDURAL; INTRATHECAL; INTRAVENOUS at 11:17

## 2024-11-11 RX ADMIN — SIMETHICONE 125 MG: 125 TABLET, CHEWABLE ORAL at 22:30

## 2024-11-11 RX ADMIN — ACETAMINOPHEN 1000 MG: 500 TABLET ORAL at 14:32

## 2024-11-11 RX ADMIN — BUPIVACAINE HYDROCHLORIDE IN DEXTROSE 1.4 ML: 7.5 INJECTION, SOLUTION SUBARACHNOID at 11:17

## 2024-11-11 RX ADMIN — ACETAMINOPHEN 1000 MG: 500 TABLET ORAL at 20:26

## 2024-11-11 RX ADMIN — DEXAMETHASONE SODIUM PHOSPHATE 8 MG: 4 INJECTION INTRA-ARTICULAR; INTRALESIONAL; INTRAMUSCULAR; INTRAVENOUS; SOFT TISSUE at 11:23

## 2024-11-11 RX ADMIN — PHENYLEPHRINE HYDROCHLORIDE 200 MCG: 10 INJECTION INTRAVENOUS at 11:25

## 2024-11-11 RX ADMIN — SODIUM CHLORIDE, SODIUM GLUCONATE, SODIUM ACETATE, POTASSIUM CHLORIDE AND MAGNESIUM CHLORIDE: 526; 502; 368; 37; 30 INJECTION, SOLUTION INTRAVENOUS at 11:06

## 2024-11-11 RX ADMIN — KETOROLAC TROMETHAMINE 15 MG: 15 INJECTION, SOLUTION INTRAMUSCULAR; INTRAVENOUS at 11:58

## 2024-11-11 RX ADMIN — SODIUM CITRATE AND CITRIC ACID MONOHYDRATE 30 ML: 334; 500 SOLUTION ORAL at 10:23

## 2024-11-11 RX ADMIN — KETOROLAC TROMETHAMINE 15 MG: 15 INJECTION, SOLUTION INTRAMUSCULAR; INTRAVENOUS at 18:09

## 2024-11-11 RX ADMIN — GLYCOPYRROLATE 0.2 MG: 0.2 INJECTION INTRAMUSCULAR; INTRAVENOUS at 11:17

## 2024-11-11 RX ADMIN — SODIUM CHLORIDE, SODIUM GLUCONATE, SODIUM ACETATE, POTASSIUM CHLORIDE AND MAGNESIUM CHLORIDE 1000 ML: 526; 502; 368; 37; 30 INJECTION, SOLUTION INTRAVENOUS at 10:33

## 2024-11-11 RX ADMIN — CEFAZOLIN 2 G: 1 INJECTION, POWDER, FOR SOLUTION INTRAMUSCULAR; INTRAVENOUS at 11:23

## 2024-11-11 SDOH — ECONOMIC STABILITY: TRANSPORTATION INSECURITY
IN THE PAST 12 MONTHS, HAS THE LACK OF TRANSPORTATION KEPT YOU FROM MEDICAL APPOINTMENTS OR FROM GETTING MEDICATIONS?: NO

## 2024-11-11 SDOH — ECONOMIC STABILITY: TRANSPORTATION INSECURITY
IN THE PAST 12 MONTHS, HAS LACK OF RELIABLE TRANSPORTATION KEPT YOU FROM MEDICAL APPOINTMENTS, MEETINGS, WORK OR FROM GETTING THINGS NEEDED FOR DAILY LIVING?: NO

## 2024-11-11 ASSESSMENT — PAIN DESCRIPTION - PAIN TYPE
TYPE: SURGICAL PAIN
TYPE: SURGICAL PAIN
TYPE: ACUTE PAIN
TYPE: SURGICAL PAIN

## 2024-11-11 ASSESSMENT — SOCIAL DETERMINANTS OF HEALTH (SDOH)

## 2024-11-11 ASSESSMENT — LIFESTYLE VARIABLES: ALCOHOL_USE: NO

## 2024-11-11 ASSESSMENT — PAIN SCALES - GENERAL: PAIN_LEVEL: 0

## 2024-11-11 NOTE — PROGRESS NOTES
1808- Pt Presents to L&D with c/o decreased fetal movement. She did feel baby move 1 hr ago. Denies any LOF, VB, or CTXs. This pregnancy is complicated by GDM and AMA. EFM applied, audible FM. Vitals WNL.     1820- Report given to SURINDER Roberts. Continue to monitor for reactive tracing. Can order BPP If reactive tracing is not obtained.     1858- SBAR given to MANJIT Gomez.

## 2024-11-11 NOTE — ANESTHESIA POSTPROCEDURE EVALUATION
Patient: Jaylene Oneil    Procedure Summary       Date: 24 Room / Location: [facility] OR 02 / [facility]    Anesthesia Start: 1106 Anesthesia Stop: 1217    Procedure:  SECTION, PRIMARY (Abdomen) Diagnosis: (37.5 WEEKS NON REASSURING FETAL HEART TONES)    Surgeons: Gustavo Perry M.D. Responsible Provider: Tone Walter M.D.    Anesthesia Type: spinal ASA Status: 2            Final Anesthesia Type: spinal  Last vitals  BP   Blood Pressure: 116/61    Temp   36.4 °C (97.6 °F)    Pulse   77   Resp   18    SpO2   98 %      Anesthesia Post Evaluation    Patient location during evaluation: PACU  Patient participation: complete - patient participated  Level of consciousness: awake and alert  Pain score: 0    Airway patency: patent  Anesthetic complications: no  Cardiovascular status: hemodynamically stable  Respiratory status: acceptable  Hydration status: euvolemic    PONV: none          No notable events documented.     Nurse Pain Score: 0 (NPRS)

## 2024-11-11 NOTE — CARE PLAN
The patient is Watcher - Medium risk of patient condition declining or worsening    Shift Goals  Clinical Goals: Monitor fetal well-being.  Patient Goals: Rest and comfort.    Progress made toward(s) clinical / shift goals:    Problem: Knowledge Deficit - L&D  Goal: Patient and family/caregivers will demonstrate understanding of plan of care, disease process/condition, diagnostic tests and medications  Outcome: Progressing   POC discussed, pt verbalized understanding.   Problem: Risk for Venous Thromboembolism (VTE)  Goal: VTE prevention measures will be implemented and patient will remain free from VTE  Outcome: Progressing   Pt ambulatory.   Problem: Risk for Infection and Impaired Wound Healing  Goal: Patient will remain free from infection  Outcome: Progressing   Hand hygiene and standard precautions implemented in care.     Patient is not progressing towards the following goals: N/A.

## 2024-11-11 NOTE — PROGRESS NOTES
Late Entry  0830-Pt care assumed.  1106-In OR 2.  1134-Delivery of viable female, 8/8 apgars-transferred to NICU.  1212-In PACU.  1315-anticipate transfer to PP after uncomplicated surgery and recovery

## 2024-11-11 NOTE — PROGRESS NOTES
0700 - Report received from MANJIT Perez.  0808 - Pt reports +FM. Pt denies LOF/ VB/ denies feeling UC/ cramping/ pain.    0826 - Dr. Navarro and Dr. Perry reviewed FHT. IOL called. Orders received.  0830 - Report given to MANJIT Rose.

## 2024-11-11 NOTE — ANESTHESIA PREPROCEDURE EVALUATION
Case: [id number] Date: 24    Procedure:  SECTION, PRIMARY (Abdomen)    Anesthesia type: Spinal    Pre-op diagnosis: 37.5 WEEKS NON REASSURING FETAL HEART TONES    Location: [facility]    Surgeons: Gustavo Perry M.D.            Relevant Problems   OB   (positive) Gestational diabetes mellitus (GDM) in third trimester       Physical Exam    Airway   Mallampati: II  TM distance: >3 FB  Neck ROM: full       Cardiovascular - normal exam  Rhythm: regular  Rate: normal  (-) murmur     Dental - normal exam           Pulmonary - normal exam  Breath sounds clear to auscultation     Abdominal    Neurological - normal exam                   Anesthesia Plan    ASA 2       Plan - spinal   Neuraxial block will be primary anesthetic                Postoperative Plan: Postoperative administration of opioids is intended.    Pertinent diagnostic labs and testing reviewed    Informed Consent:    Anesthetic plan and risks discussed with patient.

## 2024-11-11 NOTE — PROGRESS NOTES
"Antepartum Progress Note    Jaylene Oneil   37w5d  Admission DX: Labor and delivery indication for care or intervention [O75.9]  Indication for care in labor and delivery, antepartum [O75.9]    Date of Admission: 11/10/2024  Patient Active Problem List    Diagnosis Date Noted    Indication for care in labor and delivery, antepartum 2024    Labor and delivery indication for care or intervention 11/10/2024    Gestational diabetes mellitus (GDM) in third trimester 10/08/2024    High-risk pregnancy in third trimester 2024    Abnormal  ultrasound 2024    Maternal serum screen positive for trisomy 21 07/10/2024    AMA (advanced maternal age) multigravida 35+ 2024       Subjective:   No complaints. Denies CTX, VB or LOF. Good FM.     Language translation used.     Objective:   Vitals:    11/10/24 1806 24 0800   BP: 120/68 116/61   Pulse: 63 77   Resp:  18   Temp:  36.4 °C (97.6 °F)   TempSrc:  Oral   SpO2:  98%   Weight: 82.6 kg (182 lb)    Height: 1.702 m (5' 7\")      FHTs: baseline 120s, overall Cat II with moderate variability and accels present, but recurrent late decelerations after most contractions  Gen: NAD  Membranes: ruptured: no  Abdomen: gravid, soft, NT  Ext: SCDs on, Nt, no cyanosis or clubbing    Meds:     Current Facility-Administered Medications:     NS infusion, , Intravenous, Continuous, Gustavo Perry M.D.    ceFAZolin (Ancef) injection 2 g, 2 g, Intravenous, Once, Gustavo Perry M.D.      Imaging:    Lab:   Recent Results (from the past 72 hours)   Hold Blood Bank Specimen (Not Tested)    Collection Time: 24  8:50 AM   Result Value Ref Range    Holding Tube - Bb DONE    CBC with Differential    Collection Time: 24  8:50 AM   Result Value Ref Range    WBC 9.9 4.8 - 10.8 K/uL    RBC 4.44 4.20 - 5.40 M/uL    Hemoglobin 13.3 12.0 - 16.0 g/dL    Hematocrit 38.9 37.0 - 47.0 %    MCV 87.6 81.4 - 97.8 fL    MCH 30.0 27.0 - 33.0 pg    MCHC 34.2 32.2 - " 35.5 g/dL    RDW 43.4 35.9 - 50.0 fL    Platelet Count 216 164 - 446 K/uL    MPV 10.8 9.0 - 12.9 fL    Neutrophils-Polys 65.40 44.00 - 72.00 %    Lymphocytes 28.90 22.00 - 41.00 %    Monocytes 5.10 0.00 - 13.40 %    Eosinophils 0.10 0.00 - 6.90 %    Basophils 0.30 0.00 - 1.80 %    Immature Granulocytes 0.20 0.00 - 0.90 %    Nucleated RBC 0.00 0.00 - 0.20 /100 WBC    Neutrophils (Absolute) 6.45 1.82 - 7.42 K/uL    Lymphs (Absolute) 2.85 1.00 - 4.80 K/uL    Monos (Absolute) 0.50 0.00 - 0.85 K/uL    Eos (Absolute) 0.01 0.00 - 0.51 K/uL    Baso (Absolute) 0.03 0.00 - 0.12 K/uL    Immature Granulocytes (abs) 0.02 0.00 - 0.11 K/uL    NRBC (Absolute) 0.00 K/uL       Assessment:  Patient Active Problem List   Diagnosis    AMA (advanced maternal age) multigravida 35+    Maternal serum screen positive for trisomy 21    Abnormal  ultrasound    High-risk pregnancy in third trimester    Gestational diabetes mellitus (GDM) in third trimester    Labor and delivery indication for care or intervention    Indication for care in labor and delivery, antepartum       44 y.o.  @ 37w5d with non reassuring fetal tracing, despite BPP 8/8. NIPT high risk for trisomy 21. Patient had endorsed decreased fetal movement for the past 2 weeks.     Consultation with maternal fetal medicine done, who recommends proceeding to delivery. JAI FT, remote from delivery. Options discussed with patient with translation services of trial of labor with pitocin induction vs outright primary  section. Risks/benefits of both discussed in detail and patient desires primary  section without trial of labor. She also desires sterilization. Federal consent was signed on 10/31/24.     Risks of  section discussed with patient, including, but not limited to, bleeding, infection, damage to other organs such as bowel, bladder, ureters, and nerves, need for reoperation, injury to fetus, need for blood transfusion if indicated. She  also understands that sterilization is permanent and irreversible, which she desires. Patient understands all risks and all questions answered. Consents to be signed.     Labor and delivery indication for care or intervention [O75.9]  Indication for care in labor and delivery, antepartum [O75.9]    Plan:    NPO  Notify anesthesia  Preop labs ordered.   Consent to be on chart.   Notify peds - plan for  echocardiogram.   Preop antibiotics- ancef 2g IV prior to start.     To OR for primary  section, bilateral salpingectomy.     Gustavo Perry M.D.    Obstetrics and Gynecology    2024 9:28 AM

## 2024-11-11 NOTE — PROGRESS NOTES
1930- audible decel noted. Maylin updated on pt and sve of ft/th/hig. Bpp ordered.    2130-u/s at bedside    2230- report given to maylin. 24hr obs orders received        2255- report given to joana.

## 2024-11-11 NOTE — ANESTHESIA PROCEDURE NOTES
Spinal Block    Date/Time: 11/11/2024 11:17 AM    Performed by: Tone Walter M.D.  Authorized by: Tone Walter M.D.    Patient Location:  OR  Start Time:  11/11/2024 11:17 AM  Reason for Block: primary anesthetic    patient identified, IV checked, site marked, risks and benefits discussed, surgical consent, monitors and equipment checked, pre-op evaluation and timeout performed    Patient Position:  Sitting  Prep: ChloraPrep, patient draped and sterile technique    Monitoring:  Blood pressure, continuous pulse oximetry and heart rate  Approach:  Midline  Location:  L4-5  Injection Technique:  Single-shot  Skin infiltration:  Lidocaine  Strength:  1%  Dose:  3ml  Needle Type:  Pencan  Needle Gauge:  25 G  CSF flowing pre/post injection:  Yes  Sensory Level:  T6

## 2024-11-11 NOTE — H&P
History and Physical    Jaylene Oneil is a 44 y.o. female  -Para:     Gestational Age:  37w5d  Admitted for:   Observation due to multiple fetal heart decels despite BPP .  Admitted to  [facility].  [facility]    HPI: Patient is admitted with the above mentioned Chief Complaint and States   Loss of fluid:   negative  Abdominal Pain:  negative  Uterine Contractions:  negative  Vaginal Bleeding:  negative  Fetal Movement:  decreased  Patient denies fever, chills, nausea, vomiting , headache, visual disturbance, or dysuria  Patient's last menstrual period was 2024.  Estimated Date of Delivery: 24  Final SANDRA: 2024, by Last Menstrual Period    Patient Active Problem List    Diagnosis Date Noted    Labor and delivery indication for care or intervention 11/10/2024    Gestational diabetes mellitus (GDM) in third trimester 10/08/2024    High-risk pregnancy in third trimester 2024    Abnormal  ultrasound 2024    Maternal serum screen positive for trisomy 21 07/10/2024    AMA (advanced maternal age) multigravida 35+ 2024       Admitting DX: Labor and delivery indication for care or intervention [O75.9]   Pregnancy Complications:  AMA, small ASD seen on US  OB Risk Factors:   advanced maternal age  Labor State:    Not in labor.    History:   has no past medical history of Addisons disease ([facility]), Adrenal disorder ([facility]), Allergy, Anemia, Anxiety, Arrhythmia, Arthritis, Asthma, Blood transfusion without reported diagnosis, Cancer ([facility]), Cataract, CHF (congestive heart failure) ([facility]), Clotting disorder ([facility]), Cushings syndrome ([facility]), Depression, Diabetic neuropathy ([facility]), GERD (gastroesophageal reflux disease), Glaucoma, Goiter, Heart attack ([facility]), Heart murmur, HIV (human immunodeficiency virus infection) ([facility]), Hyperlipidemia, Hypertension, IBD (inflammatory bowel disease), Kidney disease, Meningitis,  Migraine, Muscle disorder, Osteoporosis, Parathyroid disorder (HCC), Pituitary disease (HCC), Pulmonary emphysema (HCC), Seizure (HCC), Sickle cell disease (HCC), Stroke (HCC), Substance abuse (HCC), Tuberculosis, Ulcer, or Urinary tract infection, site not specified.     has a past surgical history that includes anjelica by laparoscopy (10/16/2011); umbilical hernia repair (10/16/2011); and hernia repair.    OB History    Para Term  AB Living   6 4 4   1 4   SAB IAB Ectopic Molar Multiple Live Births   1         4      # Outcome Date GA Lbr Rajesh/2nd Weight Sex Type Anes PTL Lv   6 Current            5 SAB 2023     SAB         Birth Comments: passed on its own   4 Term 17 39w6d  3.8 kg (8 lb 6 oz) F Vag-Spont Spinal, EPI N FACUNDO   3 Term 02/21/10 39w0d  3.827 kg (8 lb 7 oz) F Vag-Spont None N FACUNDO      Birth Comments: Pt states no complications   2 Term 02 40w0d  3.856 kg (8 lb 8 oz) F Vag-Spont None N FACUNDO      Birth Comments: Pt states no complications   1 Term 10/06/98 40w0d  3.629 kg (8 lb) F Vag-Spont None N FACUNDO      Birth Comments: Pt states no complications       Medications:  No current facility-administered medications on file prior to encounter.     Current Outpatient Medications on File Prior to Encounter   Medication Sig Dispense Refill    Blood Glucose Meter Kit Test blood sugar as recommended by provider. Please dispense 1 monitor per patient formulary (Patient not taking: Reported on 10/8/2024) 1 Kit 3    Blood Glucose Test Strips Use one strip to test blood sugar QID (Patient not taking: Reported on 10/8/2024) 100 Strip 3    Lancets Use one lancet to test blood sugar QID (Patient not taking: Reported on 10/8/2024) 100 Each 3    Alcohol Swabs Wipe site with prep pad prior to injection. (Patient not taking: Reported on 10/8/2024) 100 Each 0    famotidine (PEPCID) 20 MG Tab Take 1 Tablet by mouth 2 times a day. 60 Tablet 1    aspirin 81 MG EC tablet Take 81 mg by mouth every day.    "   Prenatal MV-Min-Fe Fum-FA-DHA (PRENATAL 1 PO) Take  by mouth.         Allergies:  Patient has no known allergies.    ROS:   Neuro: negative    Cardiovascular: negative  Gastro intestinal: negative  Genitourinary: negative            Physical Exam:  /68   Pulse 63   Ht 1.702 m (5' 7\")   Wt 82.6 kg (182 lb)   LMP 02/21/2024   BMI 28.51 kg/m²   Constitutional: healthy-appearing, Well-developed, well-nourished  No JVD: while supine  HEENT: PERRLA  Breast Exam: negative  Lung: unlabored respirations, no intercostal retractions or accessory muscle use  Abdomen: abdomen is soft without significant tenderness, masses, organomegaly or guarding, gravid  Extremity: extremities, peripheral pulses and reflexes normal    Cervical Exam: 0%  Cervix Dilatation:  Closed  Station: negative 4  Pelvis: Normal  Fetal Assessment: Fetal heart variability: moderate  Fetal Heart Rate decelerations: late  Fetal Heart Rate accelerations: yes  Baseline FHR: 130 per minute  Uterine contractions: Few  Biophysical Profile: BPP Score 8/8  Estimated Fetal Weight: 3000 - 3500g      Labs:      Prenatal Results       General (Most Recent Result)       Test Value Date Time    ABO  O  05/24/24 1427    Rh  POS  05/24/24 1427    Antibody screen  NEG  05/24/24 1427    HbA1c  6.7 % 10/10/24 1506    Chlamydia by PCR  Negative  05/22/24 1623    Gonorrhea by PCR  Negative  05/22/24 1623    RPR/Syphilus  Non-Reactive  09/11/24 0911    HSV 1/2 by PCR (non-serum)       HSV 1/2 (serum)       HSV 1       HSV 2       HPV (16)  Negative  07/18/23 1128    HBsAg  Non-Reactive  05/24/24 1427    HIV-1 HIV-2 Antibodies  Non-Reactive  05/24/24 1427    Rubella  232.00 IU/mL 05/24/24 1427    Tb                 Pap Smear (Most Recent Result)       Test Value Date Time    Pap smear       Pap smear w/HPV       Pap smear w/CTNG       Pap smar w/HPV CTNG  (See Report)   07/18/23 1128    Pap smear (reflex HPV ACUS)       Pap smear (reflex HPV ASCUS w/CTNG)  (See " Report)   01/05/17 1537    Pathology gyn specimen  (See Report)   07/18/23 1128              Urinalysis (Most Recent Result)       Test Value Date Time    Urinalysis       POC urinalysis  (See Report)   06/28/17 1547    Urine drug screen (w/ conf)  (See Report)   05/24/24 1428    Urine culture ([id number])  (See Report)   05/24/24 1427    Urine Protein/Creatinine Ratio                 Urinalysis, Culture if indicated       Test Value Date Time    Color  Yellow  05/18/24 1804    Appearance  Clear  05/18/24 1804    Specific Gravity  1.010  05/18/24 1804    PH  6.0  05/18/24 1804    Glucose  Negative mg/dL 05/18/24 1804    Ketones  Negative mg/dL 05/18/24 1804    Protein  Negative mg/dL 05/18/24 1804    Bilirubin  Negative  09/18/24 0817    Nitrites  Negative  05/18/24 1804    Leukocytes Esterase  Negative  05/18/24 1804    Blood  Negative  05/18/24 1804    Comment  see below  05/18/24 1804    Culture  No UA Culture 01/24/17 1007              Urine Drug Screen       Test Value Date Time    Amphetamines       Barbiturates  Negative ng/mL 05/24/24 1428    Benzodiazepines  Negative ng/mL 05/24/24 1428    Cocaine  Negative ng/mL 05/24/24 1428    Methadone  Negative ng/mL 05/24/24 1428    Opiates       Oxycodone       Phencylidine  Negative ng/mL 05/24/24 1428    Propoxyphene       Marijuana Metabolite  Negative ng/mL 05/24/24 1428              1st Trimester       Test Value Date Time    Hgb  12.5 g/dL 05/24/24 1427       12.6 g/dL 05/18/24 1745       13.0 g/dL 04/20/24 1309    Hct  36.2 % 05/24/24 1427       37.3 % 05/18/24 1745       38.1 % 04/20/24 1309    Fasting Glucose Tolerance       GTT, 1 hour       GTT, 2 hours       GTT, 3 hours                 2nd Trimester       Test Value Date Time    Hgb       Hct       AST       ALT       Uric Acid       Fasting Glucose Tolerance       GTT, 1 hour       GTT, 2 hours       GTT, 3 hours                 3rd Trimester       Test Value Date Time    Hgb  12.1 g/dL 09/11/24 0910     Hct  35.6 % 24 0910    Platelet count  227 K/uL 24 0910    GBS (ROSE BROTH)  Negative  10/31/24 1411    Fasting Glucose Tolerance       GTT, 1 hour  175 mg/dL 24 0911    GTT, 2 hous  176 mg/dL 10/07/24 0755       RR mg/dL 10/07/24 0755    GTT, 3hours  71 mg/dL 10/07/24 0755       RR mg/dL 10/07/24 0755              Congenital Disease Screening       Test Value Date Time    First Trimester Screen       Quad Screen       BH Electrophoresis       Cystic Fibrosis Carrier Study       SMA       AFP Maternal Serum        AFP Tetra  Abnormal   (See Report)   24 1447    NIPT                 Legend    ^: Historical                              Assessment:  Gestational Age:  37w5d  Labor State:   Antepartum  Risk Factors:   advanced maternal age, fetal heart decels  Pregnancy Complications: Small ASD seen on US    Patient Active Problem List    Diagnosis Date Noted    Labor and delivery indication for care or intervention 11/10/2024    Gestational diabetes mellitus (GDM) in third trimester 10/08/2024    High-risk pregnancy in third trimester 2024    Abnormal  ultrasound 2024    Maternal serum screen positive for trisomy 21 07/10/2024    AMA (advanced maternal age) multigravida 35+ 2024       Plan:   Admitted for: Observation, Continuous monitoring. Not in labor with reassuring BPP . May consider d/c after 24 hours if decels resolve.       BELLA Corral.

## 2024-11-11 NOTE — PROGRESS NOTES
2132- Received report from MANJIT Gomez. Pt tx to room 225.     0411- SURINDER Ricardo notified of prolong decel seen on strip.     0700- Report given to MANJIT Hutchinson.

## 2024-11-11 NOTE — ANESTHESIA TIME REPORT
Anesthesia Start and Stop Event Times       Date Time Event    11/11/2024 0939 Ready for Procedure     1106 Anesthesia Start     1217 Anesthesia Stop          Responsible Staff  11/11/24      Name Role Begin End    Tone Walter M.D. Anesth 1106 1217          Overtime Reason:  no overtime (within assigned shift)    Comments:

## 2024-11-11 NOTE — OP REPORT
Operative Report -     Patient: Jaylene Oneil  MRN: [id number]         Date of Surgery: 2024    Pre-operative Diagnosis:   1. IUP at 37w5d  2. Non reassuring fetal tracing  3. Desiring surgical sterilization  Post-operative Diagnosis: Same as above  Procedure: Primary Low Transverse  Section via Pfannenstiel incision and bilateral salpingectomy  Surgeon(s): Gustavo Perry MD  Assistant(s): Dipika Handley MD  Anesthesia: Spinal  Findings: LV female infant, Apgars 8/8, weight pending; normal uterus, tubes and ovaries bilaterally. Meconium noted at AROM  Complications: none  Specimens: Placenta to path  IV Fluids: 1000 ml  UOP: 100 mL clear yellow urine  EBL: 400 mL  Meds: Ancef 2g IV       Indications:   Pt is a 44 y.o.  at 37w5d here for primary  for non reassuring fetal tracing and sterilization. The risks, benefits and alternatives of  section were discussed with the patient, including but not limited to infection, severe loss of blood, injury to bowel or bladder, injury to blood vessels, hysterectomy, injury to fetus, possible need for transfusion, pelvic pain, adhesive disease or scar tissue and risk of repeat cesareans vs. trial of labor after .  Patient also desires sterilization and understands that sterilization is permanent and irreversible. All questions were answered and patient consented to the procedure.      Procedure:  The patient was taken to the operating room where spinal anesthesia was placed and found to be adequate. She was prepped and draped in the normal sterile fashion in the dorsal supine position with a leftward tilt. Preoperative antibiotics were administered. Correct time-out was performed.     A Pfanenstiel skin incision was made with the scalpel and carried through to the underlying layer of fascia.  The fascia was then incised in the midline and the incision was extended laterally. The rectus muscles were  and  the peritoneum entered. The peritoneal incision was then extended superiorly and inferiorly with good visualization of the bladder.    The vesicouterine peritoneum identified, grasped with pick ups and entered sharply with Metzenbaum scissors.  The incision was extended laterally and the bladder flap created digitally.  The lower uterine segment incised in a low transverse fashion with the scalpel. The uterine incision was extended bluntly with bilateral traction.    Fetus was in vertex position.  AROM was done with allis clamp and meconium fluid was noted. The infant was delivered atraumatically; the nose and mouth were suctioned and the cord was clamped and cut. The infant was handed off to the waiting staff. Cord segment was obtained for gases. The placenta was removed with gentle traction on the cord. The uterus was exteriorized and was cleared of all clots and debris.  The uterine incision was repaired with 0-vicryl in a running locked fashion. Good hemostasis was observed.     Attention was then turned to the sterilization portion of the procedure. The right fallopian tube was identified, followed out to the fimbriated end and completely excised using harmonic cautery. Good hemostasis was observed at the mesosalpinx. In a similar fashion, the left fallopian tube was identified, followed out to the fimbriated end and completely excised in a similar fashion. Good hemostasis observed at the mesosalpinx. Both fallopian tubes were sent to pathology.     The gutters were cleared of all clots and debris using copious irrigation. The uterus was then re-inspected to ensure hemostasis as were all subfascial tissues and replaced back into the abdomen. The peritoneum was approximated with 2-0 vicryl in a running fashion. The fascia was re-approximated with 0-vicryl in a running fashion. The subcutaneous tissue was re-approximated using 3-0 vicryl in a running fashion. The skin was closed with 4-0 vicryl. Dermabond  followed by pressure dressing was applied.     The patient tolerated the procedure well. Sponge, lap and needle counts were correct times three. The patient was taken to recovery in stable condition. Total blood loss was approximately 400 ml.     Gustavo Perry M.D.    Obstetrics and Gynecology    11/11/2024 12:13 PM

## 2024-11-11 NOTE — L&D DELIVERY NOTE
Delivery Note    Obstetrician:   Gustavo Perry MD    Assistant: Dipika Handley MD    Pre-Delivery Diagnosis: 44 y.o.  @ 37w5d with non reassuring fetal tracing.  Desiring surgical sterilization      Post-Delivery Diagnosis: Living  infant Female    Procedure: Primary Low Transverse  section and bilateral salpingectomy     Episiotomy or Incision: Pfannensteil    Indications for instrumental delivery: none    Infant Wt: Pending        Apgars: 1' 8  /  5' 8     Placenta and Cord:          Mechanism: spontaneous        Description:  complete, 3 vessel cord    Estimated Blood Loss:  400 ml           Total IV Fluids: 1000 ml           Specimens: Placenta to path (meconium)           Complications:  none           Condition: stable    Blood Type and Rh: O pos, negative      Rubella Immunity Status:   Immune           See operative report for full details.     Gustavo Perry M.D.    Obstetrics and Gynecology    2024 12:06 PM

## 2024-11-12 ENCOUNTER — TELEPHONE (OUTPATIENT)
Dept: OBGYN | Facility: CLINIC | Age: 44
End: 2024-11-12
Payer: MEDICAID

## 2024-11-12 LAB — PATHOLOGY CONSULT NOTE: NORMAL

## 2024-11-12 PROCEDURE — A9270 NON-COVERED ITEM OR SERVICE: HCPCS | Performed by: STUDENT IN AN ORGANIZED HEALTH CARE EDUCATION/TRAINING PROGRAM

## 2024-11-12 PROCEDURE — 700111 HCHG RX REV CODE 636 W/ 250 OVERRIDE (IP): Mod: JZ | Performed by: STUDENT IN AN ORGANIZED HEALTH CARE EDUCATION/TRAINING PROGRAM

## 2024-11-12 PROCEDURE — 770002 HCHG ROOM/CARE - OB PRIVATE (112)

## 2024-11-12 PROCEDURE — 700102 HCHG RX REV CODE 250 W/ 637 OVERRIDE(OP): Performed by: STUDENT IN AN ORGANIZED HEALTH CARE EDUCATION/TRAINING PROGRAM

## 2024-11-12 RX ADMIN — KETOROLAC TROMETHAMINE 15 MG: 15 INJECTION, SOLUTION INTRAMUSCULAR; INTRAVENOUS at 13:10

## 2024-11-12 RX ADMIN — KETOROLAC TROMETHAMINE 15 MG: 15 INJECTION, SOLUTION INTRAMUSCULAR; INTRAVENOUS at 00:02

## 2024-11-12 RX ADMIN — PRENATAL WITH FERROUS FUM AND FOLIC ACID 1 TABLET: 3080; 920; 120; 400; 22; 1.84; 3; 20; 10; 1; 12; 200; 27; 25; 2 TABLET ORAL at 10:01

## 2024-11-12 RX ADMIN — IBUPROFEN 800 MG: 800 TABLET, FILM COATED ORAL at 18:31

## 2024-11-12 RX ADMIN — DOCUSATE SODIUM 100 MG: 100 CAPSULE, LIQUID FILLED ORAL at 10:02

## 2024-11-12 RX ADMIN — ACETAMINOPHEN 1000 MG: 500 TABLET ORAL at 16:47

## 2024-11-12 RX ADMIN — DIPHENHYDRAMINE HYDROCHLORIDE 25 MG: 25 TABLET ORAL at 20:41

## 2024-11-12 RX ADMIN — DOCUSATE SODIUM 100 MG: 100 CAPSULE, LIQUID FILLED ORAL at 16:47

## 2024-11-12 RX ADMIN — DIPHENHYDRAMINE HYDROCHLORIDE 12.5 MG: 50 INJECTION, SOLUTION INTRAMUSCULAR; INTRAVENOUS at 04:57

## 2024-11-12 RX ADMIN — ACETAMINOPHEN 1000 MG: 500 TABLET ORAL at 03:13

## 2024-11-12 RX ADMIN — SIMETHICONE 125 MG: 125 TABLET, CHEWABLE ORAL at 10:02

## 2024-11-12 RX ADMIN — OXYCODONE 5 MG: 5 TABLET ORAL at 20:34

## 2024-11-12 RX ADMIN — KETOROLAC TROMETHAMINE 15 MG: 15 INJECTION, SOLUTION INTRAMUSCULAR; INTRAVENOUS at 06:18

## 2024-11-12 RX ADMIN — ACETAMINOPHEN 1000 MG: 500 TABLET ORAL at 10:02

## 2024-11-12 ASSESSMENT — PAIN DESCRIPTION - PAIN TYPE
TYPE: ACUTE PAIN
TYPE: ACUTE PAIN;SURGICAL PAIN
TYPE: ACUTE PAIN
TYPE: ACUTE PAIN;SURGICAL PAIN
TYPE: ACUTE PAIN

## 2024-11-12 NOTE — CARE PLAN
The patient is Stable - Low risk of patient condition declining or worsening    Shift Goals  Clinical Goals: Pain mgmt  Patient Goals: Rest and comfort.    Progress made toward(s) clinical / shift goals:    Problem: Knowledge Deficit - Standard  Goal: Patient and family/care givers will demonstrate understanding of plan of care, disease process/condition, diagnostic tests and medications  Outcome: Progressing     Problem: Pain - Standard  Goal: Alleviation of pain or a reduction in pain to the patient’s comfort goal  Outcome: Progressing       Patient is not progressing towards the following goals:

## 2024-11-12 NOTE — TELEPHONE ENCOUNTER
LVM for patient to CB and schedule PP appointment and C section check - delivered 11/11/24// VASILE

## 2024-11-12 NOTE — PROGRESS NOTES
2886 - Patient transferred from labor and delivery in [facility] with infant in arms and FOB accompanying with belongings. Report received from SURI Rose&UDAY RN. Patient electing to use daughter to translate. Patient oriented to unit and POC. Assessment completed. Patient educated on room, medications, and emergency cord. Patient encouraged to call with needs. Call light within reach. All concerns and questions answered at this time.

## 2024-11-12 NOTE — PROGRESS NOTES
" SECTION POSTPARTUM PROGRESS NOTE    PATIENT ID:  NAME:  Jaylene Oneil  MRN:               [id number]  YOB: 1980     44 y.o. female  s/p 1LTCS/BS for non reassuring fetal tracing POD #1      Subjective: Patient reports she is well this morning.  She is tolerating PO without significant nausea or vomiting, she is bottle feeding, voiding, and lochia is less than menses.  No flatus yet. She ambulating without presyncopal symptoms and her pain is well controlled.  She denies headache, shortness of breath, fever/chills, urinary symptoms, or vaginal bleeding.     Objective:    Vitals:    24 0100 24 0200 24 0300 24 0600   BP:  113/57  130/71   Pulse: 72 63 68 67   Resp: 16 18 18 17   Temp:  36.3 °C (97.4 °F)  36.6 °C (97.8 °F)   TempSrc:  Temporal  Temporal   SpO2: 98% 99% 96% 99%   Weight:       Height:         General: No acute distress, resting comfortably in bed.  HEENT: normocephalic, nontraumatic, PERRLA, EOMI  Cardiovascular: Heart RRR with no murmurs, rubs or gallops. Distal Pulses 2+  Respiratory: symmetric chest expansion, lungs CTA bilaterally with no wheezes rales or rhonci  Abdomen: soft, mildly appropriately tender around dressing which is clean, dry and intact, fundus firm.  Genitourinary: lochia light, denies excessive vaginal bleeding  Musculoskeletal: strength 5/5 in four extremities  Neuro: non focal with no numbness, tingling or changes in sensation      Recent Labs     24  0850 24   WBC 9.9 14.8*   RBC 4.44 4.14*   HEMOGLOBIN 13.3 12.1   HEMATOCRIT 38.9 35.5*   MCV 87.6 85.7   MCH 30.0 29.2   RDW 43.4 41.7   PLATELETCT 216 211   MPV 10.8 11.2   NEUTSPOLYS 65.40  --    LYMPHOCYTES 28.90  --    MONOCYTES 5.10  --    EOSINOPHILS 0.10  --    BASOPHILS 0.30  --      No results for input(s): \"SODIUM\", \"POTASSIUM\", \"CHLORIDE\", \"CO2\", \"GLUCOSE\", \"BUN\", \"CPKTOTAL\" in the last 72 hours.    Current Meds:   Current Facility-Administered " Medications   Medication Dose Frequency Provider Last Rate Last Admin    NS infusion   Once Gustavo Perry M.D.        oxytocin (Pitocin) infusion bolus (for post delivery)  10 Units Once Gustavo Perry M.D.        Followed by    oxytocin (Pitocin) infusion bolus (for post delivery)  10 Units Once Gustavo Perry M.D.        Followed by    oxytocin (Pitocin) infusion (for post delivery)  125 mL/hr Continuous Gustavo Perry M.D.        oxytocin (Pitocin) injection 10 Units  10 Units Once PRN Gustavo Perry M.D.        acetaminophen (Tylenol) tablet 1,000 mg  1,000 mg Q6HR Tone Walter M.D.   1,000 mg at 11/12/24 0313    ketorolac (Toradol) 15 MG/ML injection 15 mg  15 mg Q6HR Tone Walter M.D.   15 mg at 11/12/24 0618    oxyCODONE immediate-release (Roxicodone) tablet 5 mg  5 mg Q4HRS PRHORACIO Walter M.D.        oxyCODONE immediate release (Roxicodone) tablet 10 mg  10 mg Q4HRS PRN Tone Walter M.D.        HYDROmorphone (Dilaudid) injection 0.2 mg  0.2 mg Q2HRS PRHORACIO Walter M.D.        HYDROmorphone (Dilaudid) injection 0.4 mg  0.4 mg Q2HRS PRHORACIO Walter M.D.        ePHEDrine injection 10 mg  10 mg Q5 MIN PRHORACIO Walter M.D.        ondansetron (Zofran) syringe/vial injection 4 mg  4 mg Q6HRS PRHORACIO Walter M.D.        diphenhydrAMINE (Benadryl) injection 12.5 mg  12.5 mg Q6HRS PRHORACIO Walter M.D.        diphenhydrAMINE (Benadryl) injection 12.5 mg  12.5 mg Q6HRS EVERETT Walter M.D.   12.5 mg at 11/12/24 0457    Or    diphenhydrAMINE (Benadryl) injection 25 mg  25 mg Q6HRS PRN Tone Walter M.D.        Or    naloxone HCl (Narcan) 20 mg in  mL infusion  0.4 mg/hr Q6HRS PRN Tone Walter M.D.        ibuprofen (Motrin) tablet 800 mg  800 mg Q8HRS Dipika Handley M.D.        Followed by    [START ON 11/15/2024] ibuprofen (Motrin) tablet 800 mg  800 mg Q8HRS PRN Dipika Handley M.D.        acetaminophen (Tylenol) tablet 1,000 mg  1,000 mg Q6HRS  Dipika Handley M.D.        Followed by    [START ON 11/15/2024] acetaminophen (Tylenol) tablet 1,000 mg  1,000 mg Q6HRS PRN Dipika Handley M.D.        oxyCODONE immediate-release (Roxicodone) tablet 5 mg  5 mg Q4HRS PRN Dipika Handley M.D.        oxyCODONE immediate release (Roxicodone) tablet 10 mg  10 mg Q4HRS PRN Dipika Handley M.D.        ondansetron (Zofran) syringe/vial injection 4 mg  4 mg Q6HRS PRN Dipika Handley M.D.        Or    ondansetron (Zofran ODT) dispertab 4 mg  4 mg Q6HRS PRN Dipika Handley M.D.        diphenhydrAMINE (Benadryl) tablet/capsule 25 mg  25 mg Q6HRS PRN Dipika Handley M.D.        Or    diphenhydrAMINE (Benadryl) injection 25 mg  25 mg Q6HRS PRN Dipika Handley M.D.        docusate sodium (Colace) capsule 100 mg  100 mg BID PRN Dipika Handley M.D.        magnesium hydroxide (Milk Of Magnesia) suspension 30 mL  30 mL Q6HRS PRN Dipika Handley M.D.        prenatal plus vitamin (Stuartnatal 1+1) 27-1 MG tablet 1 Tablet  1 Tablet Daily-0800 Dipika Handley M.D.        simethicone (Mylicon) chewable tablet 125 mg  125 mg 4X/DAY PRN Dipika Handley M.D.   125 mg at 24 2230    calcium carbonate (Tums) chewable tab 1,000 mg  1,000 mg Q6HRS PRN Dipika Handley M.D.       Last reviewed on 2024  9:58 AM by Rose Ace R.N.         Assessment/Plan:    44 y.o.  s/p 1LTCS/BS for non reassuring fetal tracing POD #1    Doing well    Continue postop care.     Gustavo Perry M.D.    Obstetrics and Gynecology    2024 7:12 AM

## 2024-11-13 PROCEDURE — A9270 NON-COVERED ITEM OR SERVICE: HCPCS | Performed by: STUDENT IN AN ORGANIZED HEALTH CARE EDUCATION/TRAINING PROGRAM

## 2024-11-13 PROCEDURE — 770002 HCHG ROOM/CARE - OB PRIVATE (112)

## 2024-11-13 PROCEDURE — 700102 HCHG RX REV CODE 250 W/ 637 OVERRIDE(OP): Performed by: STUDENT IN AN ORGANIZED HEALTH CARE EDUCATION/TRAINING PROGRAM

## 2024-11-13 RX ADMIN — ACETAMINOPHEN 1000 MG: 500 TABLET ORAL at 05:33

## 2024-11-13 RX ADMIN — ACETAMINOPHEN 1000 MG: 500 TABLET ORAL at 18:47

## 2024-11-13 RX ADMIN — ACETAMINOPHEN 1000 MG: 500 TABLET ORAL at 00:04

## 2024-11-13 RX ADMIN — IBUPROFEN 800 MG: 800 TABLET, FILM COATED ORAL at 05:33

## 2024-11-13 RX ADMIN — IBUPROFEN 800 MG: 800 TABLET, FILM COATED ORAL at 15:25

## 2024-11-13 RX ADMIN — ACETAMINOPHEN 1000 MG: 500 TABLET ORAL at 11:19

## 2024-11-13 RX ADMIN — PRENATAL WITH FERROUS FUM AND FOLIC ACID 1 TABLET: 3080; 920; 120; 400; 22; 1.84; 3; 20; 10; 1; 12; 200; 27; 25; 2 TABLET ORAL at 11:19

## 2024-11-13 ASSESSMENT — PAIN DESCRIPTION - PAIN TYPE
TYPE: ACUTE PAIN
TYPE: ACUTE PAIN;SURGICAL PAIN
TYPE: ACUTE PAIN
TYPE: ACUTE PAIN;SURGICAL PAIN
TYPE: SURGICAL PAIN
TYPE: ACUTE PAIN;SURGICAL PAIN
TYPE: SURGICAL PAIN

## 2024-11-13 ASSESSMENT — EDINBURGH POSTNATAL DEPRESSION SCALE (EPDS)
I HAVE BEEN SO UNHAPPY THAT I HAVE BEEN CRYING: NO, NEVER
I HAVE LOOKED FORWARD WITH ENJOYMENT TO THINGS: AS MUCH AS I EVER DID
I HAVE FELT SCARED OR PANICKY FOR NO GOOD REASON: NO, NOT AT ALL
I HAVE BEEN SO UNHAPPY THAT I HAVE HAD DIFFICULTY SLEEPING: NOT AT ALL
I HAVE BEEN ABLE TO LAUGH AND SEE THE FUNNY SIDE OF THINGS: AS MUCH AS I ALWAYS COULD
THE THOUGHT OF HARMING MYSELF HAS OCCURRED TO ME: NEVER
I HAVE BLAMED MYSELF UNNECESSARILY WHEN THINGS WENT WRONG: NOT VERY OFTEN
THINGS HAVE BEEN GETTING ON TOP OF ME: NO, I HAVE BEEN COPING AS WELL AS EVER
I HAVE BEEN ANXIOUS OR WORRIED FOR NO GOOD REASON: NO, NOT AT ALL
I HAVE FELT SAD OR MISERABLE: NO, NOT AT ALL

## 2024-11-13 NOTE — CARE PLAN
The patient is Stable - Low risk of patient condition declining or worsening    Shift Goals  Clinical Goals: VSS; Lochia WDL; Pain WDL  Patient Goals: Rest and comfort.    Progress made toward(s) clinical / shift goals:    Problem: Knowledge Deficit - Postpartum  Goal: Patient will verbalize and demonstrate understanding of self and infant care  Outcome: Progressing     Problem: Psychosocial - Postpartum  Goal: Patient will verbalize and demonstrate effective bonding and parenting behavior  Outcome: Progressing     Problem: Altered Physiologic Condition  Goal: Patient physiologically stable as evidenced by normal lochia, palpable uterine involution and vitals within normal limits  Outcome: Progressing     Problem: Infection - Postpartum  Goal: Postpartum patient will be free of signs and symptoms of infection  Outcome: Progressing     Problem: Respiratory/Oxygenation Function Post-Surgical  Goal: Patient will achieve/maintain normal respiratory rate/effort  Outcome: Progressing     Problem: Bowel Elimination - Post Surgical  Goal: Patient will resume regular bowel sounds and function with no discomfort or distention  Outcome: Progressing     Problem: Early Mobilization - Post Surgery  Goal: Early mobilization post surgery  Outcome: Progressing       Patient is not progressing towards the following goals:

## 2024-11-13 NOTE — CARE PLAN
The patient is Stable - Low risk of patient condition declining or worsening    Shift Goals  Clinical Goals: Pain management, lochia light  Patient Goals: Rest  Family Goals: Support    Progress made toward(s) clinical / shift goals:  Pain well controlled with scheduled medications. Pt is able to ambulate and care for self without difficulty. Lochia light with no clots expressed. Pt performing pad changes independently with all self care. No s/s of infection present. Call light within reach, pt educated on POC. Infant in NICU    Patient is not progressing towards the following goals:

## 2024-11-13 NOTE — PROGRESS NOTES
Report received from Kinga SARAVIA. Assessment completed, VSS. Pt complain of pain and itching, medication administered.   Discussed POC and to notify RN if she returns to NICU, verbalized understanding

## 2024-11-13 NOTE — CARE PLAN
The patient is Stable - Low risk of patient condition declining or worsening    Shift Goals  Clinical Goals: fundus firm, lochia WDL  Patient Goals: Rest  Family Goals: Support    Progress made toward(s) clinical / shift goals:    Problem: Altered Physiologic Condition  Goal: Patient physiologically stable as evidenced by normal lochia, palpable uterine involution and vitals within normal limits  Outcome: Progressing  Note: Fundus firm and midline. Lochia light, rubra. Vitals within defined limits     Problem: Early Mobilization - Post Surgery  Goal: Early mobilization post surgery  Outcome: Progressing  Note: Pt ambulates independently within patient's room       Patient is not progressing towards the following goals:

## 2024-11-13 NOTE — PROGRESS NOTES
1000 Assessment completed. Lochia light, fundus firm. Plan of care reviewed. Reports Moderate pain, see MAR for intervention, will call if further pain intervention needed.

## 2024-11-13 NOTE — PROGRESS NOTES
1100  Received report from MANJIT Bennett at change of shift.    Pt and daughter returned from NICU. Patient assessed and POC discussed with daughter of pt translating. Patient is resting in bed. Fundus firm, lochia light.  Patient denies any needs at this time. Call light within reach, bed in lowest position. Patient is encouraged to call for pain/med interventions and any other needs.

## 2024-11-13 NOTE — PROGRESS NOTES
Postpartum Progress Note    Jaylene Oneil is a 44 y.o. now  on post-op day #2 following primary  delivery on  at 1134 due to non-reassuring fetal status.    Subjective:   Pt reports overall feeling well.   Patient is meeting postpartum milestones.   Pain is well controlled with pain medication.    Pt is ambulating independently.   Pt has voided spontaneously.  Pt has passed gas.  Pt has not had bowel movement.  Pt is tolerating oral intake.    Lochia is light.    Infant is feeding via formula, reports this is going well.    Nursing concerns: none    Review of Symptoms:  GEN: denies fever or chills  CV: denies chest pain or palpitations  RESP: denies cough or shortness of breath  ABD: denies abd pain, denies nausea/vomiting  SKIN: denies rash  : lochia is light    OBJECTIVE:    Patient Vitals for the past 24 hrs:   BP Temp Temp src Pulse Resp SpO2   24 0559 110/69 36.9 °C (98.5 °F) Temporal 66 15 99 %   24 1757 105/65 36.8 °C (98.2 °F) Temporal 69 17 99 %   24 1054 124/75 36.3 °C (97.3 °F) Temporal 67 17 100 %       Physical Exam:  Gen: NAD, Alert, conversant  CV: +S1S2, RRR, cap refill <2 sec, trace BLE edema  Resp: CTAB, breathing comfortably on room air  Abd: soft, ND, appropriately tender to palpation, no rebound/guarding, +BS;   : fundus palpable below umbilicus, lochia is light,   Incision: clean/dry/intact, no drainage or dehiscence noted  Ext: moving all extremities    Meds:   Current Facility-Administered Medications:     NS infusion, , Intravenous, Once, Gustavo Perry M.D.    [] oxytocin (Pitocin) infusion bolus (for post delivery), 10 Units, Intravenous, Once **FOLLOWED BY** [] oxytocin (Pitocin) infusion bolus (for post delivery), 10 Units, Intravenous, Once **FOLLOWED BY** oxytocin (Pitocin) infusion (for post delivery), 125 mL/hr, Intravenous, Continuous, Gustavo Perry M.D.    oxytocin (Pitocin) injection 10 Units, 10 Units, Intramuscular,  Once PRN, Gustavo Perry M.D.    ibuprofen (Motrin) tablet 800 mg, 800 mg, Oral, Q8HRS, 800 mg at 11/13/24 0533 **FOLLOWED BY** [START ON 11/15/2024] ibuprofen (Motrin) tablet 800 mg, 800 mg, Oral, Q8HRS PRN, Dipika Handley M.D.    acetaminophen (Tylenol) tablet 1,000 mg, 1,000 mg, Oral, Q6HRS, 1,000 mg at 11/13/24 0533 **FOLLOWED BY** [START ON 11/15/2024] acetaminophen (Tylenol) tablet 1,000 mg, 1,000 mg, Oral, Q6HRS PRN, Dipika Handley M.D.    oxyCODONE immediate-release (Roxicodone) tablet 5 mg, 5 mg, Oral, Q4HRS PRN, Dipika Handley M.D., 5 mg at 11/12/24 2034    oxyCODONE immediate release (Roxicodone) tablet 10 mg, 10 mg, Oral, Q4HRS PRN, Dipika Handley M.D.    ondansetron (Zofran) syringe/vial injection 4 mg, 4 mg, Intravenous, Q6HRS PRN **OR** ondansetron (Zofran ODT) dispertab 4 mg, 4 mg, Oral, Q6HRS PRN, Dipika Handley M.D.    diphenhydrAMINE (Benadryl) tablet/capsule 25 mg, 25 mg, Oral, Q6HRS PRN, 25 mg at 11/12/24 2041 **OR** diphenhydrAMINE (Benadryl) injection 25 mg, 25 mg, Intravenous, Q6HRS PRN, Dipika Handley M.D.    docusate sodium (Colace) capsule 100 mg, 100 mg, Oral, BID PRN, Dipika Handley M.D., 100 mg at 11/12/24 1647    magnesium hydroxide (Milk Of Magnesia) suspension 30 mL, 30 mL, Oral, Q6HRS PRN, Dipika Handley M.D.    prenatal plus vitamin (Stuartnatal 1+1) 27-1 MG tablet 1 Tablet, 1 Tablet, Oral, Daily-0800, Dipika Handley M.D., 1 Tablet at 11/12/24 1001    simethicone (Mylicon) chewable tablet 125 mg, 125 mg, Oral, 4X/DAY PRN, Dipika Handley M.D., 125 mg at 11/12/24 1002    calcium carbonate (Tums) chewable tab 1,000 mg, 1,000 mg, Oral, Q6HRS PRN, Dipika Handley M.D.    Lab:   Recent Results (from the past 48 hours)   Hold Blood Bank Specimen (Not Tested)    Collection Time: 11/11/24  8:50 AM   Result Value Ref Range    Holding Tube - Bb DONE    CBC with Differential    Collection Time: 11/11/24  8:50 AM   Result Value Ref Range    WBC 9.9 4.8 - 10.8 K/uL    RBC 4.44  4.20 - 5.40 M/uL    Hemoglobin 13.3 12.0 - 16.0 g/dL    Hematocrit 38.9 37.0 - 47.0 %    MCV 87.6 81.4 - 97.8 fL    MCH 30.0 27.0 - 33.0 pg    MCHC 34.2 32.2 - 35.5 g/dL    RDW 43.4 35.9 - 50.0 fL    Platelet Count 216 164 - 446 K/uL    MPV 10.8 9.0 - 12.9 fL    Neutrophils-Polys 65.40 44.00 - 72.00 %    Lymphocytes 28.90 22.00 - 41.00 %    Monocytes 5.10 0.00 - 13.40 %    Eosinophils 0.10 0.00 - 6.90 %    Basophils 0.30 0.00 - 1.80 %    Immature Granulocytes 0.20 0.00 - 0.90 %    Nucleated RBC 0.00 0.00 - 0.20 /100 WBC    Neutrophils (Absolute) 6.45 1.82 - 7.42 K/uL    Lymphs (Absolute) 2.85 1.00 - 4.80 K/uL    Monos (Absolute) 0.50 0.00 - 0.85 K/uL    Eos (Absolute) 0.01 0.00 - 0.51 K/uL    Baso (Absolute) 0.03 0.00 - 0.12 K/uL    Immature Granulocytes (abs) 0.02 0.00 - 0.11 K/uL    NRBC (Absolute) 0.00 K/uL   T.PALLIDUM AB SELENA (Syphilis)    Collection Time: 24  8:50 AM   Result Value Ref Range    Syphilis, Treponemal Qual Nonreactive Non-Reactive   Histology Request    Collection Time: 24 11:40 AM   Result Value Ref Range    Pathology Request Sent to Histo    CBC without differential- Once in 8 hours post delivery    Collection Time: 24  8:58 PM   Result Value Ref Range    WBC 14.8 (H) 4.8 - 10.8 K/uL    RBC 4.14 (L) 4.20 - 5.40 M/uL    Hemoglobin 12.1 12.0 - 16.0 g/dL    Hematocrit 35.5 (L) 37.0 - 47.0 %    MCV 85.7 81.4 - 97.8 fL    MCH 29.2 27.0 - 33.0 pg    MCHC 34.1 32.2 - 35.5 g/dL    RDW 41.7 35.9 - 50.0 fL    Platelet Count 211 164 - 446 K/uL    MPV 11.2 9.0 - 12.9 fL       No intake or output data in the 24 hours ending 24 0734    Assessment and Plan:   Jaylene Oneil is a 44 y.o. now  on post-op day #2 following primary  delivery due to non-reassuring fetal status.    #postpartum  - Doing well postpartum, meeting all postpartum milestones  - encouraged ambulation, cont routine postop care  - continue prenatal vitamins    # delivery  - incision  check in clinic in 1-2 weeks    #Rh pos    #Rubella immune    #HIV  neg    #contraception:  - s/p bilateral salpingectomy    # ppx:   - SCDs  - Early ambulation    Disposition: Anticipate discharge to home on POD 3 or 4    PIA Rodriguez DO  PGY 1  [facility]

## 2024-11-14 ENCOUNTER — PHARMACY VISIT (OUTPATIENT)
Dept: PHARMACY | Facility: MEDICAL CENTER | Age: 44
End: 2024-11-14
Payer: COMMERCIAL

## 2024-11-14 VITALS
OXYGEN SATURATION: 99 % | BODY MASS INDEX: 28.56 KG/M2 | DIASTOLIC BLOOD PRESSURE: 69 MMHG | RESPIRATION RATE: 16 BRPM | HEART RATE: 68 BPM | TEMPERATURE: 97.9 F | WEIGHT: 182 LBS | HEIGHT: 67 IN | SYSTOLIC BLOOD PRESSURE: 131 MMHG

## 2024-11-14 PROCEDURE — RXMED WILLOW AMBULATORY MEDICATION CHARGE

## 2024-11-14 PROCEDURE — 700102 HCHG RX REV CODE 250 W/ 637 OVERRIDE(OP): Performed by: STUDENT IN AN ORGANIZED HEALTH CARE EDUCATION/TRAINING PROGRAM

## 2024-11-14 PROCEDURE — A9270 NON-COVERED ITEM OR SERVICE: HCPCS | Performed by: STUDENT IN AN ORGANIZED HEALTH CARE EDUCATION/TRAINING PROGRAM

## 2024-11-14 RX ORDER — ACETAMINOPHEN 500 MG
1000 TABLET ORAL EVERY 6 HOURS
Qty: 30 TABLET | Refills: 0 | Status: SHIPPED | OUTPATIENT
Start: 2024-11-14

## 2024-11-14 RX ORDER — OXYCODONE HYDROCHLORIDE 5 MG/1
5 TABLET ORAL EVERY 4 HOURS PRN
Qty: 10 TABLET | Refills: 0 | Status: SHIPPED | OUTPATIENT
Start: 2024-11-14 | End: 2024-11-16

## 2024-11-14 RX ORDER — PSEUDOEPHEDRINE HCL 30 MG
100 TABLET ORAL 2 TIMES DAILY PRN
Qty: 60 CAPSULE | Refills: 0 | Status: SHIPPED | OUTPATIENT
Start: 2024-11-14

## 2024-11-14 RX ORDER — IBUPROFEN 800 MG/1
800 TABLET, FILM COATED ORAL EVERY 8 HOURS
Qty: 30 TABLET | Refills: 0 | Status: SHIPPED | OUTPATIENT
Start: 2024-11-14

## 2024-11-14 RX ORDER — SIMETHICONE 125 MG
125 TABLET,CHEWABLE ORAL 4 TIMES DAILY PRN
COMMUNITY
Start: 2024-11-14

## 2024-11-14 RX ADMIN — ACETAMINOPHEN 1000 MG: 500 TABLET ORAL at 04:31

## 2024-11-14 RX ADMIN — ACETAMINOPHEN 1000 MG: 500 TABLET ORAL at 12:43

## 2024-11-14 RX ADMIN — OXYCODONE HYDROCHLORIDE 10 MG: 10 TABLET ORAL at 00:13

## 2024-11-14 RX ADMIN — IBUPROFEN 800 MG: 800 TABLET, FILM COATED ORAL at 07:51

## 2024-11-14 RX ADMIN — PRENATAL WITH FERROUS FUM AND FOLIC ACID 1 TABLET: 3080; 920; 120; 400; 22; 1.84; 3; 20; 10; 1; 12; 200; 27; 25; 2 TABLET ORAL at 07:51

## 2024-11-14 RX ADMIN — IBUPROFEN 800 MG: 800 TABLET, FILM COATED ORAL at 00:11

## 2024-11-14 ASSESSMENT — PAIN DESCRIPTION - PAIN TYPE
TYPE: SURGICAL PAIN

## 2024-11-14 NOTE — DISCHARGE INSTRUCTIONS

## 2024-11-14 NOTE — PROGRESS NOTES
Assumed care of patient, report at bedside from, Kinga RN. Assessment completed and WDL. Call light within reach, discussed plan of care, denies pain at the moment. Will continue to monitor.

## 2024-11-14 NOTE — DISCHARGE SUMMARY
[facility]  Obstetrics Discharge Summary    Date of Admission: 11/10/2024  Date of Discharge: 24    Admitting diagnosis:    1. Pregnancy at 37w5d  2. GDM  3. Advanced maternal age  4. NIPT with high risk for trisomy 21    Discharge Diagnosis:   1. Status post  for fetal distress with bilateral salpingectomy.  2-4 same  5. Viable female     Hospital Course:   Pt is 44 y.o. now  who presented on 11/10/2024 for observation due to multiple fetal decels despite BPP 8/8.  MFM recommended proceeding with delivery, desired primary . See Delivery Note and Op Report for details.    Postpartum course was unremarkable and patient has met all postpartum milestones.  Patient had early ambulation, well managed pain, tolerance of diet, spontaneous voiding, and appropriate feeding of infant.   She has remained afebrile and blood pressure has been well controlled.   All maternal questions and concerns addressed.    Single female infant was delivered via pLTCS on 2024 at 1134 with APGARs 8 and 8 at 1 and 5 minutes respectively.    ml    PHYSICAL EXAM:  Temp:  [36.6 °C (97.9 °F)-37.4 °C (99.4 °F)] 36.6 °C (97.9 °F)  Pulse:  [68-79] 68  Resp:  [16-18] 16  BP: (106-131)/(60-69) 131/69  SpO2:  [99 %] 99 %    GEN: well appearing, no apparent distress  CV: +S1S2, RRR, no BLE edema  RESP: CTAB, breathing comfortably on RA  ABD: soft, non-tender, non-distended, +BS  Fundus: firm below level of umbilicus  Incision: healing well, no significant drainage, no dehiscence, and no significant erythema  Perineum: Deferred  Extremities: symmetric, calves nontender    HISTORY:  Patient Active Problem List   Diagnosis    AMA (advanced maternal age) multigravida 35+    Maternal serum screen positive for trisomy 21    Abnormal  ultrasound    High-risk pregnancy in third trimester    Gestational diabetes mellitus (GDM) in third trimester      History reviewed. No pertinent past medical  history.  OB History    Para Term  AB Living   6 5 5   1 5   SAB IAB Ectopic Molar Multiple Live Births   1       0 5      # Outcome Date GA Lbr Rajesh/2nd Weight Sex Type Anes PTL Lv   6 Term 24 37w5d  2.45 kg (5 lb 6.4 oz) F CS-LTranv Spinal N FACUNDO   5 SAB 2023     SAB         Birth Comments: passed on its own   4 Term 17 39w6d  3.8 kg (8 lb 6 oz) F Vag-Spont Spinal, EPI N FACUNDO   3 Term 02/21/10 39w0d  3.827 kg (8 lb 7 oz) F Vag-Spont None N FACUNDO      Birth Comments: Pt states no complications   2 Term 02 40w0d  3.856 kg (8 lb 8 oz) F Vag-Spont None N FACUNDO      Birth Comments: Pt states no complications   1 Term 10/06/98 40w0d  3.629 kg (8 lb) F Vag-Spont None N FACUNDO      Birth Comments: Pt states no complications     Past Surgical History:   Procedure Laterality Date    PRIMARY C SECTION N/A 2024    Procedure:  SECTION, PRIMARY;  Surgeon: Gustavo Perry M.D.;  Location: SURGERY LABOR AND DELIVERY;  Service: Labor and Delivery    MARYELLEN BY LAPAROSCOPY  10/16/2011    Performed by SANDHYA CAMARENA at SURGERY [facility] ORS    UMBILICAL HERNIA REPAIR  10/16/2011    Performed by SANDHYA CAMARENA at [facility] ORS    HERNIA REPAIR       No Known Allergies   Current Facility-Administered Medications   Medication Dose    NS infusion      oxytocin (Pitocin) infusion (for post delivery)  125 mL/hr    oxytocin (Pitocin) injection 10 Units  10 Units    ibuprofen (Motrin) tablet 800 mg  800 mg    Followed by    [START ON 11/15/2024] ibuprofen (Motrin) tablet 800 mg  800 mg    acetaminophen (Tylenol) tablet 1,000 mg  1,000 mg    Followed by    [START ON 11/15/2024] acetaminophen (Tylenol) tablet 1,000 mg  1,000 mg    oxyCODONE immediate-release (Roxicodone) tablet 5 mg  5 mg    oxyCODONE immediate release (Roxicodone) tablet 10 mg  10 mg    ondansetron (Zofran) syringe/vial injection 4 mg  4 mg    Or    ondansetron (Zofran ODT) dispertab 4 mg  4 mg    diphenhydrAMINE  (Benadryl) tablet/capsule 25 mg  25 mg    Or    diphenhydrAMINE (Benadryl) injection 25 mg  25 mg    docusate sodium (Colace) capsule 100 mg  100 mg    magnesium hydroxide (Milk Of Magnesia) suspension 30 mL  30 mL    prenatal plus vitamin (Stuartnatal 1+1) 27-1 MG tablet 1 Tablet  1 Tablet    simethicone (Mylicon) chewable tablet 125 mg  125 mg    calcium carbonate (Tums) chewable tab 1,000 mg  1,000 mg     Recent Labs     24   WBC 14.8*   RBC 4.14*   HEMOGLOBIN 12.1   HEMATOCRIT 35.5*   MCV 85.7   MCH 29.2   MCHC 34.1   RDW 41.7   PLATELETCT 211   MPV 11.2       Discharge Meds:   Current Outpatient Medications   Medication Sig Dispense Refill    acetaminophen (TYLENOL) 500 MG Tab Take 2 Tablets by mouth every 6 hours. 30 Tablet 0    calcium carbonate 1000 MG Chew Tab Chew 1 Tablet every 6 hours as needed (Heartburn).      docusate sodium 100 MG Cap Take 100 mg by mouth 2 times a day as needed for Constipation. 60 Capsule 0    ibuprofen (MOTRIN) 800 MG Tab Take 1 Tablet by mouth every 8 hours. 30 Tablet 0    oxyCODONE immediate-release (ROXICODONE) 5 MG Tab Take 1 Tablet by mouth every four hours as needed for Severe Pain for up to 2 days. 10 Tablet 0    simethicone (MYLICON) 125 MG chewable tablet Chew 1 Tablet 4 times a day as needed for Flatulence.         #Contraception  - s/p bilateral salpingectomy    Activity/ Discharge Instructions:  Discharge to home  Exercise and Activities as tolerated  Call or come to ED for: heavy vaginal bleeding, fever >100.4, severe abdominal pain, severe headache, chest pain, shortness of breath, significant nausea or vomiting, incisional drainage, or other concerns.    Diet:  As tolerated. Additional 400 kcal per day to maintain milk supply. Drink plenty of fluids daily.  Continue prenatal vitamins  with iron for six months or as long as breastfeeding.     Follow up:     [facility] in one week for incision check for  delivery.    PIA Rodriguez  DO  PGY1  [facility]

## 2024-11-15 NOTE — PROGRESS NOTES
Pt assessed, fundus firm, lochia light. No s/s of distress. Bonding well w/ infant. Visits NICU frequently. Family @ bs, pt up to BR and voiding w/o difficulty. Medicated for pain at this time, abd inc w/ dermabmichelle, raghavendra, cdi, pt passing gas. Discussed discharge at a later time today.

## 2024-11-15 NOTE — PROGRESS NOTES
At approximately this time reviewed discharge paperwork and follow up appointments with patient and family member who were at bedside. Reviewed prescriptions. Verbalized understanding. Patient accompanied by family to care conference and meds to beds given to patient after conference.

## 2024-11-15 NOTE — CARE PLAN
The patient is Stable - Low risk of patient condition declining or worsening    Shift Goals  Clinical Goals: fundus firm, lochia light, INC free from infection  Patient Goals: visit baby, care conference at 1530  Family Goals: comfort mother, support    Progress made toward(s) clinical / shift goals:  patient has a lot of family support, visits nicu frequently, is prepared for discharge and is planning to attend care conference to be involved in patient care. Pain relieved with medication as prescribed    Patient is not progressing towards the following goals:

## 2024-11-21 ENCOUNTER — GYNECOLOGY VISIT (OUTPATIENT)
Dept: OBGYN | Facility: CLINIC | Age: 44
End: 2024-11-21
Payer: MEDICAID

## 2024-11-21 VITALS — WEIGHT: 168 LBS | SYSTOLIC BLOOD PRESSURE: 122 MMHG | DIASTOLIC BLOOD PRESSURE: 60 MMHG | BODY MASS INDEX: 26.31 KG/M2

## 2024-11-21 PROCEDURE — 3074F SYST BP LT 130 MM HG: CPT | Performed by: PHYSICIAN ASSISTANT

## 2024-11-21 PROCEDURE — 0503F POSTPARTUM CARE VISIT: CPT | Performed by: PHYSICIAN ASSISTANT

## 2024-11-21 PROCEDURE — 3078F DIAST BP <80 MM HG: CPT | Performed by: PHYSICIAN ASSISTANT

## 2024-11-21 ASSESSMENT — ENCOUNTER SYMPTOMS
CHILLS: 0
FEVER: 0
CONSTIPATION: 0
DEPRESSION: 0
NERVOUS/ANXIOUS: 0

## 2024-11-21 ASSESSMENT — FIBROSIS 4 INDEX: FIB4 SCORE: 1.18

## 2024-11-21 NOTE — PROGRESS NOTES
Subjective     Jaylene Oneil is a 44 y.o. female who presents with Gynecologic Exam (C/S check )  C/S check today. Pt is 10 days s/p primary C/S for FITL with BTL/BS without complications. Pt has no complaints- denies heavy vaginal bleeding, depression, intercourse, pain or BF - baby in NICU needing oxygen currently. Pt notes some pain on right side of incision and some itchiness. Pt to try Bendaryl cream prn, cold packs and pt still has Ibuprofen at home for pain management. BCM desired is BTL/BS, done at time of surgery.            HPI    Review of Systems   Constitutional:  Negative for chills and fever.   Gastrointestinal:  Negative for constipation.   Psychiatric/Behavioral:  Negative for depression. The patient is not nervous/anxious.    All other systems reviewed and are negative.             Objective     /60   Wt 168 lb   LMP 02/21/2024 Comment: bottle feeding.  Breastfeeding No   BMI 26.31 kg/m²      Physical Exam  Vitals reviewed.   Constitutional:       Appearance: She is normal weight.   HENT:      Head: Normocephalic.      Nose: Nose normal.      Mouth/Throat:      Mouth: Mucous membranes are moist.      Pharynx: Oropharynx is clear.   Eyes:      Pupils: Pupils are equal, round, and reactive to light.   Pulmonary:      Effort: Pulmonary effort is normal.   Abdominal:      General: Abdomen is flat.      Palpations: Abdomen is soft.          Comments: C/S incision C/D/I without erythema or induration.    Genitourinary:     Uterus: Enlarged. Not tender.    Musculoskeletal:         General: Normal range of motion.      Cervical back: Normal range of motion.   Skin:     General: Skin is warm and dry.   Neurological:      General: No focal deficit present.      Mental Status: She is alert. Mental status is at baseline.   Psychiatric:         Mood and Affect: Mood normal.         Behavior: Behavior normal.         Thought Content: Thought content normal.         Judgment: Judgment  normal.                             Assessment & Plan        Assessment & Plan  Postpartum care following  delivery  - Healing well, pain appropriate  - Not breastfeeding  - Benadryl cream, cool packs for itching prn  - F/u 4 wk for PP visit  - BTL/BS done at time of surgery for BCM

## 2024-11-21 NOTE — PROGRESS NOTES
Pt here for C/S check delivered on 11/11/2024 primary C/S due fetal tracing + BTL  Currently bottle feeding baby in NICU   # [phone] (home)   Pt states having pain on right side of her incision and itchy.   Post partum visit 12/26/2024

## 2024-11-21 NOTE — ASSESSMENT & PLAN NOTE
- Healing well, pain appropriate  - Not breastfeeding  - Benadryl cream, cool packs for itching prn  - F/u 4 wk for PP visit  - BTL/BS done at time of surgery for BCM

## 2024-12-26 ENCOUNTER — POST PARTUM (OUTPATIENT)
Dept: OBGYN | Facility: CLINIC | Age: 44
End: 2024-12-26
Payer: MEDICAID

## 2024-12-26 VITALS — DIASTOLIC BLOOD PRESSURE: 50 MMHG | SYSTOLIC BLOOD PRESSURE: 86 MMHG

## 2024-12-26 DIAGNOSIS — Z86.32 HISTORY OF DIET CONTROLLED GESTATIONAL DIABETES MELLITUS (GDM): ICD-10-CM

## 2024-12-26 PROBLEM — O28.3 ABNORMAL ANTENATAL ULTRASOUND: Status: RESOLVED | Noted: 2024-07-29 | Resolved: 2024-12-26

## 2024-12-26 PROBLEM — O28.5 MATERNAL SERUM SCREEN POSITIVE FOR TRISOMY 21: Status: RESOLVED | Noted: 2024-07-10 | Resolved: 2024-12-26

## 2024-12-26 PROBLEM — O09.529 AMA (ADVANCED MATERNAL AGE) MULTIGRAVIDA 35+: Status: RESOLVED | Noted: 2024-05-22 | Resolved: 2024-12-26

## 2024-12-26 PROBLEM — O24.419 GESTATIONAL DIABETES MELLITUS (GDM) IN THIRD TRIMESTER: Status: RESOLVED | Noted: 2024-10-08 | Resolved: 2024-12-26

## 2024-12-26 PROBLEM — O09.93 HIGH-RISK PREGNANCY IN THIRD TRIMESTER: Status: RESOLVED | Noted: 2024-09-23 | Resolved: 2024-12-26

## 2024-12-26 PROCEDURE — 3078F DIAST BP <80 MM HG: CPT

## 2024-12-26 PROCEDURE — 3074F SYST BP LT 130 MM HG: CPT

## 2024-12-26 PROCEDURE — 0503F POSTPARTUM CARE VISIT: CPT

## 2024-12-26 NOTE — PROGRESS NOTES
Pt here today for postpartum exam.  Delivery type: c/s 11/11  Currently :bottle   Desired BCM: btl 11/11,   LMP: n/a  Last pap: 7/18/23 wnl   Phone # [phone] (home)   Epds 4

## 2024-12-26 NOTE — PROGRESS NOTES
Subjective   Subjective:    Jaylene Oneil is a 44 y.o. female who presents for her postpartum exam 6  weeks following a primary  section with bilateral salpingectomy. Her prenatal course was complicated by AMA, GDM, and abnormal AFP +Trisomy 21.  Her delivery was complicated by a non reassuring fetal heart rate tracing that required a  section. Her method of feeding infant is bottle feeding. Reports no sex prior to this appointment. Patient denies crying spells, irritability, or mood swings.     Pap WNL on 2023    Eating a regular diet without difficulty.   Bowel movement are Normal.      Denies breast problems, dysuria, vaginal bleeding, vaginal itching    EDPS 4      Problem List     Patient Active Problem List    Diagnosis Date Noted    History of diet controlled gestational diabetes mellitus (GDM) 2024    Postpartum care following  delivery 2024       Objective    Lab:No results found for this or any previous visit (from the past 6 weeks).  Vitals:    24 1311   BP: (!) 86/50     Vitals:    24 1311   BP: (!) 86/50     Objective    Well nourished female in no acute distress  A& O x 3  Respirations clear and non labored on room air  No edema noted and pulses WNL bilaterally in lower extremities; no claudication  BS x 4; no guarding or tenderness  Breasts: no erythema or discharge. No masses or tenderness.   Incision is clean and dry, some irritation noted at edges.     Assessment   Assessment:    1. Postpartum Exam  2. General Counseling and Advice on Contraception  3. Screening for Postpartum Depression    Plan   Plan:    1. Breastfeeding support   2. Continue PNV   3. Contraceptive counseling- condoms to prevent STDs  4. Discussed diet, exercise and resumption of sexual activity.  Discussed signs and symptoms of stress incontinence and reviewed pelvic floor exercises.    5. Follow up 1 year or prn  6.  2 hour GTT ordered for GDM follow up

## (undated) DEVICE — CHLORAPREP 26 ML APPLICATOR - ORANGE TINT(25/CA)

## (undated) DEVICE — SLEEVE, SEQUENTIAL CALF REG

## (undated) DEVICE — BAG SPONGE COUNT 10.25 X 32 - BLUE (250/CA)

## (undated) DEVICE — TRAY FOLEY CATHETER STATLOCK 16FR SURESTEP (10EA/CA)

## (undated) DEVICE — KIT  I.V. START (100EA/CA)

## (undated) DEVICE — SUTURE 2-0 VICRYL PLUS CT-1 36 (36PK/BX)"

## (undated) DEVICE — SENSOR SPO2 NEO LNCS ADHESIVE (20/BX) SEE USER NOTES

## (undated) DEVICE — SOLUTION PLASMA-LYTE PH 7.4 INJ 1000ML (14EA/CA)

## (undated) DEVICE — BLANKET UNDERBODY FULL ACCES - (5/CA)

## (undated) DEVICE — TUBE CONNECT SUCTION CLEAR 120 X 1/4" (50EA/CA)"

## (undated) DEVICE — SUCTION INSTRUMENT YANKAUER BULBOUS TIP W/O VENT (50EA/CA)

## (undated) DEVICE — PAD GROUNDING PRE-JELLED - (50EA/PK)

## (undated) DEVICE — PACK ROOM TURNOVER L&D (12/CA)

## (undated) DEVICE — ELECTRODE RADIOLUCENT LF 3PK - RED DOT (3/PK 200PK/CA)

## (undated) DEVICE — GLOVE BIOGEL INDICATOR SZ 6.5 SURGICAL PF LTX - (50PR/BX 4BX/CA)

## (undated) DEVICE — HEAD HOLDER JUNIOR/ADULT

## (undated) DEVICE — TELFA 8 X 10 BIOSEAL - (50EA/CA)

## (undated) DEVICE — RETRACTOR O C SECTION LRY - (5/BX)

## (undated) DEVICE — SUTURE 4-0 VICRYL PLUS SH - UNDYED 27 INCH (36PK/BX)

## (undated) DEVICE — CATHETER IV NON-SAFETY 18 GA X 1 1/4 (50/BX 4BX/CA)

## (undated) DEVICE — GLOVE BIOGEL SZ 6.5 SURGICAL PF LTX (50PR/BX 4BX/CA)

## (undated) DEVICE — SET EXTENSION WITH 2 PORTS (48EA/CA) ***PART #2C8610 IS A SUBSTITUTE*****

## (undated) DEVICE — BLANKET STERILE CHICKIE FOR L&D (100/CA)

## (undated) DEVICE — CANNULA O2 COMFORT SOFT EAR ADULT 7 FT TUBING (50/CA)

## (undated) DEVICE — CANISTER SUCTION 3000ML MECHANICAL FILTER AUTO SHUTOFF MEDI-VAC NONSTERILE LF DISP (40EA/CA)

## (undated) DEVICE — CHLORAPREP 3 ML APPLICATOR - (25/BX 4BX/CS 100/CS)

## (undated) DEVICE — PLUMEPEN ULTRA 3/8 IN X 10 FT HOSE (20EA/CA)

## (undated) DEVICE — PACK C-SECTION (2EA/CA)

## (undated) DEVICE — TRAY SPINAL ANESTHESIA NON-SAFETY (10/CA)

## (undated) DEVICE — SUTUREABS02-0 CT1 27IN - (36EA/BX)

## (undated) DEVICE — SENSOR SPO2 ADULT LNCS ADTX (20/BX) ORDER ITEM #19593

## (undated) DEVICE — SODIUM CHL IRRIGATION 0.9% 1000ML (12EA/CA)

## (undated) DEVICE — WATER IRRIGATION STERILE 1000ML (12EA/CA)

## (undated) DEVICE — WRAP PROBE OXIMETER INFANT UNIVERSAL DESIGN TO FIT NEONATAL FOOT INFANT TOE OR PED FINGER (12EA/BX)

## (undated) DEVICE — ADHESIVE DERMABOND HVD MINI (12EA/BX)

## (undated) DEVICE — SPONGE GAUZESTER 4 X 4 4PLY - (128PK/CA)